# Patient Record
Sex: MALE | Race: ASIAN | NOT HISPANIC OR LATINO | Employment: FULL TIME | URBAN - METROPOLITAN AREA
[De-identification: names, ages, dates, MRNs, and addresses within clinical notes are randomized per-mention and may not be internally consistent; named-entity substitution may affect disease eponyms.]

---

## 2017-07-27 ENCOUNTER — APPOINTMENT (OUTPATIENT)
Dept: RADIOLOGY | Facility: MEDICAL CENTER | Age: 47
End: 2017-07-27
Attending: EMERGENCY MEDICINE
Payer: COMMERCIAL

## 2017-07-27 ENCOUNTER — HOSPITAL ENCOUNTER (EMERGENCY)
Facility: MEDICAL CENTER | Age: 47
End: 2017-07-27
Attending: EMERGENCY MEDICINE
Payer: COMMERCIAL

## 2017-07-27 VITALS
BODY MASS INDEX: 32.88 KG/M2 | HEIGHT: 76 IN | RESPIRATION RATE: 14 BRPM | OXYGEN SATURATION: 93 % | SYSTOLIC BLOOD PRESSURE: 95 MMHG | TEMPERATURE: 96.1 F | DIASTOLIC BLOOD PRESSURE: 63 MMHG | HEART RATE: 39 BPM | WEIGHT: 270 LBS

## 2017-07-27 DIAGNOSIS — E11.65 TYPE 2 DIABETES MELLITUS WITH HYPERGLYCEMIA, WITH LONG-TERM CURRENT USE OF INSULIN (HCC): Chronic | ICD-10-CM

## 2017-07-27 DIAGNOSIS — R42 LIGHTHEADEDNESS: ICD-10-CM

## 2017-07-27 DIAGNOSIS — Z79.4 TYPE 2 DIABETES MELLITUS WITH HYPERGLYCEMIA, WITH LONG-TERM CURRENT USE OF INSULIN (HCC): Chronic | ICD-10-CM

## 2017-07-27 DIAGNOSIS — I50.42 CHRONIC COMBINED SYSTOLIC AND DIASTOLIC HEART FAILURE (HCC): Chronic | ICD-10-CM

## 2017-07-27 LAB
ALBUMIN SERPL BCP-MCNC: 4 G/DL (ref 3.2–4.9)
ALBUMIN/GLOB SERPL: 1.5 G/DL
ALP SERPL-CCNC: 35 U/L (ref 30–99)
ALT SERPL-CCNC: 23 U/L (ref 2–50)
ANION GAP SERPL CALC-SCNC: 9 MMOL/L (ref 0–11.9)
APTT PPP: 24.7 SEC (ref 24.7–36)
AST SERPL-CCNC: 17 U/L (ref 12–45)
BASOPHILS # BLD AUTO: 0.6 % (ref 0–1.8)
BASOPHILS # BLD: 0.05 K/UL (ref 0–0.12)
BILIRUB SERPL-MCNC: 0.9 MG/DL (ref 0.1–1.5)
BNP SERPL-MCNC: 74 PG/ML (ref 0–100)
BUN SERPL-MCNC: 34 MG/DL (ref 8–22)
CALCIUM SERPL-MCNC: 8.9 MG/DL (ref 8.5–10.5)
CHLORIDE SERPL-SCNC: 104 MMOL/L (ref 96–112)
CO2 SERPL-SCNC: 21 MMOL/L (ref 20–33)
CREAT SERPL-MCNC: 1.19 MG/DL (ref 0.5–1.4)
EKG IMPRESSION: NORMAL
EOSINOPHIL # BLD AUTO: 0.26 K/UL (ref 0–0.51)
EOSINOPHIL NFR BLD: 3.3 % (ref 0–6.9)
ERYTHROCYTE [DISTWIDTH] IN BLOOD BY AUTOMATED COUNT: 42.7 FL (ref 35.9–50)
GFR SERPL CREATININE-BSD FRML MDRD: >60 ML/MIN/1.73 M 2
GLOBULIN SER CALC-MCNC: 2.7 G/DL (ref 1.9–3.5)
GLUCOSE SERPL-MCNC: 200 MG/DL (ref 65–99)
HCT VFR BLD AUTO: 42.4 % (ref 42–52)
HGB BLD-MCNC: 14.5 G/DL (ref 14–18)
IMM GRANULOCYTES # BLD AUTO: 0.03 K/UL (ref 0–0.11)
IMM GRANULOCYTES NFR BLD AUTO: 0.4 % (ref 0–0.9)
INR PPP: 1.03 (ref 0.87–1.13)
LIPASE SERPL-CCNC: 34 U/L (ref 11–82)
LYMPHOCYTES # BLD AUTO: 2.11 K/UL (ref 1–4.8)
LYMPHOCYTES NFR BLD: 26.5 % (ref 22–41)
MCH RBC QN AUTO: 30.2 PG (ref 27–33)
MCHC RBC AUTO-ENTMCNC: 34.2 G/DL (ref 33.7–35.3)
MCV RBC AUTO: 88.3 FL (ref 81.4–97.8)
MONOCYTES # BLD AUTO: 1 K/UL (ref 0–0.85)
MONOCYTES NFR BLD AUTO: 12.6 % (ref 0–13.4)
NEUTROPHILS # BLD AUTO: 4.51 K/UL (ref 1.82–7.42)
NEUTROPHILS NFR BLD: 56.6 % (ref 44–72)
NRBC # BLD AUTO: 0 K/UL
NRBC BLD AUTO-RTO: 0 /100 WBC
PLATELET # BLD AUTO: 193 K/UL (ref 164–446)
PMV BLD AUTO: 10.5 FL (ref 9–12.9)
POTASSIUM SERPL-SCNC: 4.1 MMOL/L (ref 3.6–5.5)
PROT SERPL-MCNC: 6.7 G/DL (ref 6–8.2)
PROTHROMBIN TIME: 13.8 SEC (ref 12–14.6)
RBC # BLD AUTO: 4.8 M/UL (ref 4.7–6.1)
SODIUM SERPL-SCNC: 134 MMOL/L (ref 135–145)
TROPONIN I SERPL-MCNC: 0.01 NG/ML (ref 0–0.04)
WBC # BLD AUTO: 8 K/UL (ref 4.8–10.8)

## 2017-07-27 PROCEDURE — 93005 ELECTROCARDIOGRAM TRACING: CPT | Performed by: EMERGENCY MEDICINE

## 2017-07-27 PROCEDURE — 80053 COMPREHEN METABOLIC PANEL: CPT

## 2017-07-27 PROCEDURE — 83880 ASSAY OF NATRIURETIC PEPTIDE: CPT

## 2017-07-27 PROCEDURE — 84484 ASSAY OF TROPONIN QUANT: CPT

## 2017-07-27 PROCEDURE — 85730 THROMBOPLASTIN TIME PARTIAL: CPT

## 2017-07-27 PROCEDURE — 99284 EMERGENCY DEPT VISIT MOD MDM: CPT

## 2017-07-27 PROCEDURE — 71010 DX-CHEST-PORTABLE (1 VIEW): CPT

## 2017-07-27 PROCEDURE — 36415 COLL VENOUS BLD VENIPUNCTURE: CPT

## 2017-07-27 PROCEDURE — 85025 COMPLETE CBC W/AUTO DIFF WBC: CPT

## 2017-07-27 PROCEDURE — 700105 HCHG RX REV CODE 258: Performed by: EMERGENCY MEDICINE

## 2017-07-27 PROCEDURE — 85610 PROTHROMBIN TIME: CPT

## 2017-07-27 PROCEDURE — 83690 ASSAY OF LIPASE: CPT

## 2017-07-27 PROCEDURE — 96360 HYDRATION IV INFUSION INIT: CPT

## 2017-07-27 RX ORDER — SODIUM CHLORIDE 9 MG/ML
1000 INJECTION, SOLUTION INTRAVENOUS ONCE
Status: COMPLETED | OUTPATIENT
Start: 2017-07-27 | End: 2017-07-27

## 2017-07-27 RX ADMIN — SODIUM CHLORIDE 1000 ML: 9 INJECTION, SOLUTION INTRAVENOUS at 16:38

## 2017-07-27 ASSESSMENT — ENCOUNTER SYMPTOMS
DIAPHORESIS: 1
DIZZINESS: 1
SHORTNESS OF BREATH: 1
ABDOMINAL PAIN: 0

## 2017-07-27 ASSESSMENT — PAIN SCALES - GENERAL: PAINLEVEL_OUTOF10: 0

## 2017-07-27 NOTE — ED PROVIDER NOTES
ED Provider Note    Scribed for Benigno Henriquez M.D. by Ellen Mosqueda. 7/27/2017, 4:24 PM.    Primary care provider: Ravi Mares M.D.  Means of arrival: ambulance  History obtained from: patient  History limited by: none    CHIEF COMPLAINT  Chief Complaint   Patient presents with   • Lightheadedness       HPI  Richard Franklin is a 47 y.o. Male with a history of left ventricular hypertrophy, who presents to the Emergency Department for evaluation following an episode of what the patient believed was dizziness and lightheadedness with associated diaphoresis, shortness of breath. Patient states that this happened just prior to arrival in the ED while he was high off the ground on heavy machinery, reaching over his head. He went to his supervisor who called EMS. Patient reports feeling almost completely back to normal once EMS arrived approximately 10-15 minutes later, and he continues to feel normal at this time. Patient describes extensive cardiac abnormalities including left ventricular hypertrophy, trigeminy, bigeminy, and LBBB all of which have been present since 2005. No complaints of abdominal pain.      REVIEW OF SYSTEMS  Review of Systems   Constitutional: Positive for diaphoresis.   Respiratory: Positive for shortness of breath.    Gastrointestinal: Negative for abdominal pain.   Neurological: Positive for dizziness.   All other systems reviewed and are negative.  C.     PAST MEDICAL HISTORY   has a past medical history of Chronic combined systolic and diastolic heart failure (CMS-HCC); DM (diabetes mellitus) (CMS-HCC); Diastolic dysfunction; Hyperlipoproteinemia; HTN (hypertension); Mixed hyperlipidemia; Sleep apnea; History of obesity; Palpitations; PVC (premature ventricular contraction); Hypertriglyceridemia; and LBBB (left bundle branch block).    SURGICAL HISTORY   has past surgical history that includes other.    SOCIAL HISTORY  Social History   Substance Use Topics   • Smoking status: Never  "Smoker    • Smokeless tobacco: Former User   • Alcohol Use: None      History   Drug Use Not on file       FAMILY HISTORY  Family History   Problem Relation Age of Onset   • Hypertension Mother        CURRENT MEDICATIONS  Home Medications     Reviewed by Iva Jacob R.N. (Registered Nurse) on 07/27/17 at 1611  Med List Status: Partial    Medication Last Dose Status    amitriptyline (ELAVIL) 25 MG Tab 3/8/2016 Active    atorvastatin (LIPITOR) 20 MG Tab 3/8/2016 Active    carvedilol (COREG) 25 MG TABS 3/8/2016 Active    Fenofibrate 40 MG TABS 3/8/2016 Active    Icosapent Ethyl 1 G Cap  Active    insulin aspart (NOVOLOG) 100 UNIT/ML Solution 3/8/2016 Active    insulin detemir (LEVEMIR FLEXPEN) 100 UNIT/ML Solution Pen-injector injection 3/8/2016 Active    lisinopril (PRINIVIL) 2.5 MG TABS 3/8/2016 Active    Metformin HCl 1000 MG TABLET SR 24 HR 3/8/2016 Active    tramadol (ULTRAM) 50 MG Tab 3/8/2016 Active    vardenafil (LEVITRA) 20 MG tablet 3/8/2016 Active                ALLERGIES  No Known Allergies    PHYSICAL EXAM  VITAL SIGNS: BP 95/63 mmHg  Pulse 83  Temp(Src) 35.6 °C (96.1 °F)  Resp 14  Ht 1.93 m (6' 3.98\")  Wt 122.471 kg (270 lb)  BMI 32.88 kg/m2    Constitutional: Well developed, Well nourished, No acute distress, Non-toxic appearance.   HENT: Normocephalic, Atraumatic, Bilateral external ears normal, oropharynx moist, No oral exudates, Nose normal.   Eyes: Pupils are equal round and react to light, extraocular motions are intact, conjunctiva is normal, there are no signs of exudate.   Neck: Supple, no meningeal signs.  Lymphatic: No lymphadenopathy noted.   Cardiovascular: Regular rate and rhythm without murmurs gallops or rubs.   Thorax & Lungs: No respiratory distress. Breathing comfortably. Lungs are clear to auscultation bilaterally, there are no wheezes no rales. Chest wall is nontender.  Abdomen: Soft, nontender, nondistended. Bowel sounds are present.   Skin: Warm, Dry, No erythema, "   Musculoskeletal: Good range of motion in all major joints. No tenderness to palpation or major deformities noted. Intact distal pulses, no clubbing, no cyanosis, bilateral 1+ pitting edema,   Neurologic: Alert & oriented x 3, Moving all extremities. No gross abnormalities.    Psychiatric: Affect normal, Judgment normal, Mood normal.     LABS  Results for orders placed or performed during the hospital encounter of 07/27/17   CBC with Differential   Result Value Ref Range    WBC 8.0 4.8 - 10.8 K/uL    RBC 4.80 4.70 - 6.10 M/uL    Hemoglobin 14.5 14.0 - 18.0 g/dL    Hematocrit 42.4 42.0 - 52.0 %    MCV 88.3 81.4 - 97.8 fL    MCH 30.2 27.0 - 33.0 pg    MCHC 34.2 33.7 - 35.3 g/dL    RDW 42.7 35.9 - 50.0 fL    Platelet Count 193 164 - 446 K/uL    MPV 10.5 9.0 - 12.9 fL    Neutrophils-Polys 56.60 44.00 - 72.00 %    Lymphocytes 26.50 22.00 - 41.00 %    Monocytes 12.60 0.00 - 13.40 %    Eosinophils 3.30 0.00 - 6.90 %    Basophils 0.60 0.00 - 1.80 %    Immature Granulocytes 0.40 0.00 - 0.90 %    Nucleated RBC 0.00 /100 WBC    Neutrophils (Absolute) 4.51 1.82 - 7.42 K/uL    Lymphs (Absolute) 2.11 1.00 - 4.80 K/uL    Monos (Absolute) 1.00 (H) 0.00 - 0.85 K/uL    Eos (Absolute) 0.26 0.00 - 0.51 K/uL    Baso (Absolute) 0.05 0.00 - 0.12 K/uL    Immature Granulocytes (abs) 0.03 0.00 - 0.11 K/uL    NRBC (Absolute) 0.00 K/uL   Complete Metabolic Panel (CMP)   Result Value Ref Range    Sodium 134 (L) 135 - 145 mmol/L    Potassium 4.1 3.6 - 5.5 mmol/L    Chloride 104 96 - 112 mmol/L    Co2 21 20 - 33 mmol/L    Anion Gap 9.0 0.0 - 11.9    Glucose 200 (H) 65 - 99 mg/dL    Bun 34 (H) 8 - 22 mg/dL    Creatinine 1.19 0.50 - 1.40 mg/dL    Calcium 8.9 8.5 - 10.5 mg/dL    AST(SGOT) 17 12 - 45 U/L    ALT(SGPT) 23 2 - 50 U/L    Alkaline Phosphatase 35 30 - 99 U/L    Total Bilirubin 0.9 0.1 - 1.5 mg/dL    Albumin 4.0 3.2 - 4.9 g/dL    Total Protein 6.7 6.0 - 8.2 g/dL    Globulin 2.7 1.9 - 3.5 g/dL    A-G Ratio 1.5 g/dL   Btype Natriuretic  Peptide (BNP)   Result Value Ref Range    B Natriuretic Peptide 74 0 - 100 pg/mL   Prothrombin Time (PT/INR)   Result Value Ref Range    PT 13.8 12.0 - 14.6 sec    INR 1.03 0.87 - 1.13   APTT   Result Value Ref Range    APTT 24.7 24.7 - 36.0 sec   Lipase   Result Value Ref Range    Lipase 34 11 - 82 U/L   Troponin STAT   Result Value Ref Range    Troponin I 0.01 0.00 - 0.04 ng/mL   ESTIMATED GFR   Result Value Ref Range    GFR If African American >60 >60 mL/min/1.73 m 2    GFR If Non African American >60 >60 mL/min/1.73 m 2   EKG (NOW)   Result Value Ref Range    Report       [facility] Emergency Dept.    Test Date:  2017  Pt Name:    JAMISON SILVA                  Department: ER  MRN:        [id number]                      Room:       [facility]  Gender:     M                            Technician: [id number]  :        1970                   Requested By:ER TRIAGE PROTOCOL  Order #:    [id number]                    Reading MD: YOLY GALLEGOS MD    Measurements  Intervals                                Axis  Rate:       84                           P:          -3  KS:         208                          QRS:        0  QRSD:       138                          T:          -80  QT:         408  QTc:        483    Interpretive Statements  SINUS RHYTHM  VENTRICULAR TRIGEMINY  BORDERLINE AV CONDUCTION DELAY  LEFT BUNDLE BRANCH BLOCK  BASELINE WANDER IN LEAD(S) V2  No previous ECG available for comparison    Electronically Signed On 2017 18:59:33 PDT by YOLY GALLEGOS MD       All labs reviewed by me.    EKG  As above    RADIOLOGY  DX-CHEST-PORTABLE (1 VIEW)   Final Result      No consolidation.        The radiologist's interpretation of all radiological studies have been reviewed by me.    COURSE & MEDICAL DECISION MAKING  Pertinent Labs & Imaging studies reviewed. (See chart for details)    4:24 PM - Patient seen and examined at bedside. Patient will be treated with NS 1L IV for  dehydration. Ordered with DX-Chest, estimated GFR, CMP, BNP, PT/INR, APTT, Lipase, and Troponin to evaluate his symptoms. The differential diagnoses include but are not limited to: dehydration, hypothyroidism, CHF. I informed the patient that his symptoms could be secondary to dehydration, however, basic labs and chest xray would be ordered to rule out any acute causes.      6:09 PM Patient was reevaluated at bedside. He was resting comfortably. Informed the patient of their lab and imaging results and recommended he set up with a stress test with a cardiologist. The patient agreed to their plan of care and discharge home.     Decision Making:  She presents for evaluation. Clinically the patient appears well. He states he is feeling well. I do felt most likely an overexertion. Possible heat exhaustion type situation that caused his symptoms. The patient was given a liter bolus of normal saline. He states he's feeling great after that. Laboratory studies except for sugars were completely normal. Sugar slightly elevated, but he states that he has a low but elevated normally. At this point he feels well. I feel the patient is stable for discharge. Recommend a follow-up with primary care physician as needed.  The patient will return for new or worsening symptoms and is stable at the time of discharge.      DISPOSITION:  Patient will be discharged home in stable condition.    FOLLOW UP:  Ravi Mares M.D.  [address]  [address]  [place] [address]  [phone]    Schedule an appointment as soon as possible for a visit  For re-check, Return if any symptoms worsen    FINAL IMPRESSION  1. Lightheadedness    2. Type 2 diabetes mellitus with hyperglycemia, with long-term current use of insulin (CMS-[facility])    3. Chronic combined systolic and diastolic heart failure (CMS-[facility])        I, Ellen Mosqueda (Waldemar), am scribing for, and in the presence of, Benigno Henriquez M.D..    Electronically signed by: Ellen Clemens),  7/27/2017    IBenigno M.D. personally performed the services described in this documentation, as scribed by Ellen Mosqueda in my presence, and it is both accurate and complete.    The note accurately reflects work and decisions made by me.  Benigno Henriquez  7/27/2017  9:57 PM

## 2017-07-27 NOTE — ED AVS SNAPSHOT
7/27/2017    Richard Calloway Rigoberto  [address]  [place] [place] [facility] [address]    Dear Richrad:    [facility] wants to ensure your discharge home is safe and you or your loved ones have had all of your questions answered regarding your care after you leave the hospital.    Below is a list of resources and contact information should you have any questions regarding your hospital stay, follow-up instructions, or active medical symptoms.    Questions or Concerns Regarding… Contact   Medical Questions Related to Your Discharge  (7 days a week, 8am-5pm) Contact a Nurse Care Coordinator   [phone]   Medical Questions Not Related to Your Discharge  (24 hours a day / 7 days a week)  Contact the Nurse Health Line   [phone]    Medications or Discharge Instructions Refer to your discharge packet   or contact your [facility] Primary Care Provider   [phone]   Follow-up Appointment(s) Schedule your appointment via [employer]   or contact Scheduling [phone]   Billing Review your statement via [employer]  or contact Billing [phone]   Medical Records Review your records via [employer]   or contact Medical Records [phone]     You may receive a telephone call within two days of discharge. This call is to make certain you understand your discharge instructions and have the opportunity to have any questions answered. You can also easily access your medical information, test results and upcoming appointments via the [employer] free online health management tool. You can learn more and sign up at [employer]. For assistance setting up your [employer] account, please call [phone].    Once again, we want to ensure your discharge home is safe and that you have a clear understanding of any next steps in your care. If you have any questions or concerns, please do not hesitate to contact us, we are here for you. Thank you for choosing [facility] for your healthcare needs.    Sincerely,    Your [facility] Healthcare Team

## 2017-07-27 NOTE — ED AVS SNAPSHOT
[employer] Access Code: [email or web address]  Expires: 8/26/2017  6:30 PM    [employer]  A secure, online tool to manage your health information     [employer]’s [employer]® is a secure, online tool that connects you to your personalized health information from the privacy of your home -- day or night - making it very easy for you to manage your healthcare. Once the activation process is completed, you can even access your medical information using the [employer] tj, which is available for free in the Apple Tj store or Google Play store.     [employer] provides the following levels of access (as shown below):   My Chart Features   [facility] Primary Care Doctor [facility]  Specialists [facility]  Urgent  Care Non-[facility]  Primary Care  Doctor   Email your healthcare team securely and privately 24/7 X X X X   Manage appointments: schedule your next appointment; view details of past/upcoming appointments X      Request prescription refills. X      View recent personal medical records, including lab and immunizations X X X X   View health record, including health history, allergies, medications X X X X   Read reports about your outpatient visits, procedures, consult and ER notes X X X X   See your discharge summary, which is a recap of your hospital and/or ER visit that includes your diagnosis, lab results, and care plan. X X       How to register for [employer]:  1. Go to  [email or web address].  2. Click on the Sign Up Now box, which takes you to the New Member Sign Up page. You will need to provide the following information:  a. Enter your [employer] Access Code exactly as it appears at the top of this page. (You will not need to use this code after you’ve completed the sign-up process. If you do not sign up before the expiration date, you must request a new code.)   b. Enter your date of birth.   c. Enter your home email address.   d. Click Submit, and follow the next screen’s instructions.  3. Create a [employer] ID. This will be your [employer]  login ID and cannot be changed, so think of one that is secure and easy to remember.  4. Create a [employer] password. You can change your password at any time.  5. Enter your Password Reset Question and Answer. This can be used at a later time if you forget your password.   6. Enter your e-mail address. This allows you to receive e-mail notifications when new information is available in [employer].  7. Click Sign Up. You can now view your health information.    For assistance activating your [employer] account, call [phone]

## 2017-07-27 NOTE — ED NOTES
Pt BIB EMS from work. Pt states he was at work looking up when he developed lightheadedness, SOB and became diaphoretic lasting 10-15 minutes. Symptoms have now improved. Pt also reporting feeling more tired than normal for the past month

## 2017-07-27 NOTE — ED NOTES
PIV established, blood drawn and sent to lab. Fluids infusing per ERP's orders. Orthostatic blood pressures performed. Denies any dizziness. Call light within reach, aware of POC, will continue to monitor.

## 2017-07-27 NOTE — ED AVS SNAPSHOT
Home Care Instructions                                                                                                                Richard Franklin   MRN: [id number]    Department:  [facility], Emergency Dept   Date of Visit:  7/27/2017            [facility], Emergency Dept    [address]    [place] [address]    Phone:  [phone]      You were seen by     Benigno Henriquez M.D.      Your Diagnosis Was     Lightheadedness     R42       These are the medications you received during your hospitalization from 07/27/2017 1556 to 07/27/2017 1830     Date/Time Order Dose Route Action    07/27/2017 1638 NS infusion 1,000 mL 1,000 mL Intravenous New Bag      Follow-up Information     1. Schedule an appointment as soon as possible for a visit with Ravi Mares M.D..    Specialty:  Family Medicine    Why:  For re-check, Return if any symptoms worsen    Contact information    [address]  [address]  [place] [address]  [phone]        Medication Information     Review all of your home medications and newly ordered medications with your primary doctor and/or pharmacist as soon as possible. Follow medication instructions as directed by your doctor and/or pharmacist.     Please keep your complete medication list with you and share with your physician. Update the information when medications are discontinued, doses are changed, or new medications (including over-the-counter products) are added; and carry medication information at all times in the event of emergency situations.               Medication List      ASK your doctor about these medications        Instructions    Morning Afternoon Evening Bedtime    amitriptyline 25 MG Tabs   Commonly known as:  ELAVIL        Take 25 mg by mouth every evening.   Dose:  25 mg                        atorvastatin 20 MG Tabs   Commonly known as:  LIPITOR        Take 1 Tab by mouth every day.   Dose:  20 mg                        carvedilol 25  MG Tabs   Commonly known as:  COREG        Take 1 Tab by mouth 2 Times a Day. TWICE DAILY   Dose:  25 mg                        Fenofibrate 40 MG Tabs        Take 40 mg by mouth.   Dose:  40 mg                        Icosapent Ethyl 1 G Caps        Take 2 g by mouth 2 Times a Day.   Dose:  2 g                        insulin aspart 100 UNIT/ML Soln   Commonly known as:  NOVOLOG        Inject  as instructed 3 times a day before meals.                        LEVEMIR FLEXPEN 100 UNIT/ML Sopn injection   Generic drug:  insulin detemir        Inject  as instructed every evening. Indications: 50 units                        LEVITRA 20 MG tablet   Generic drug:  vardenafil        Take 20 mg by mouth 1 time daily as needed.   Dose:  20 mg                        lisinopril 2.5 MG Tabs   Commonly known as:  PRINIVIL        Take 2 Tabs by mouth every day.   Dose:  5 mg                        Metformin HCl 1000 MG Tb24        Take  by mouth.                        tramadol 50 MG Tabs   Commonly known as:  ULTRAM        Take  mg by mouth 3 times a day.   Dose:   mg                                Procedures and tests performed during your visit     APTT    Btype Natriuretic Peptide (BNP)    CBC with Differential    Complete Metabolic Panel (CMP)    DX-CHEST-PORTABLE (1 VIEW)    ESTIMATED GFR    Lipase    Prothrombin Time (PT/INR)    Troponin STAT            Patient Information     Patient Information    Following emergency treatment: all patient requiring follow-up care must return either to a private physician or a clinic if your condition worsens before you are able to obtain further medical attention, please return to the emergency room.     Billing Information    At [facility], we work to make the billing process streamlined for our patients.  Our Representatives are here to answer any questions you may have regarding your hospital bill.  If you have insurance coverage and have supplied your insurance  information to us, we will submit a claim to your insurer on your behalf.  Should you have any questions regarding your bill, we can be reached online or by phone as follows:  Online: You are able pay your bills online or live chat with our representatives about any billing questions you may have. We are here to help Monday - Friday from 8:00am to 7:30pm and 9:00am - 12:00pm on Saturdays.  Please visit [email or web address]  for more information.   Phone:  [phone] or [phone]    Please note that your emergency physician, surgeon, pathologist, radiologist, anesthesiologist, and other specialists are not employed by [facility] and will therefore bill separately for their services.  Please contact them directly for any questions concerning their bills at the numbers below:     Emergency Physician Services:  [phone]  [facility]:  [phone]  [facility]:  [phone]  [facility]:  [phone]    1. Your final bill may vary from the amount quoted upon discharge if all procedures are not complete at that time, or if your doctor has additional procedures of which we are not aware. You will receive an additional bill if you return to the Emergency Department at [facility] for suture removal regardless of the facility of which the sutures were placed.     2. Please arrange for settlement of this account at the emergency registration.    3. All self-pay accounts are due in full at the time of treatment.  If you are unable to meet this obligation then payment is expected within 4-5 days.     4. If you have had radiology studies (CT, X-ray, Ultrasound, MRI), you have received a preliminary result during your emergency department visit. Please contact the radiology department [phone] to receive a copy of your final result. Please discuss the Final result with your primary physician or with the follow up physician  provided.     Crisis Hotline:  [place] Crisis Hotline:  3-879-GCXANWD or 1-181.872.1292  [place] Crisis Hotline:    1-154.374.7097 or 363-930-0065         ED Discharge Follow Up Questions    1. In order to provide you with very good care, we would like to follow up with a phone call in the next few days.  May we have your permission to contact you?     YES /  NO    2. What is the best phone number to call you? (       )_____-__________    3. What is the best time to call you?      Morning  /  Afternoon  /  Evening                   Patient Signature:  ____________________________________________________________    Date:  ____________________________________________________________

## 2017-07-28 NOTE — ED NOTES
Pt given discharge instructions. Pt verbalized understanding. RN to answer any questions pt and family had. Pt instructed on follow up care. VSS. Pt ambulated out to front lobby.

## 2017-07-28 NOTE — ED NOTES
Xray completed. Pt ambulated up to bathroom with steady gait. Back to bed. Call light within reach.

## 2017-09-22 ENCOUNTER — APPOINTMENT (OUTPATIENT)
Dept: SOCIAL WORK | Facility: CLINIC | Age: 47
End: 2017-09-22
Payer: COMMERCIAL

## 2017-09-22 PROCEDURE — 90471 IMMUNIZATION ADMIN: CPT | Performed by: REGISTERED NURSE

## 2017-09-22 PROCEDURE — 90686 IIV4 VACC NO PRSV 0.5 ML IM: CPT | Performed by: REGISTERED NURSE

## 2017-11-17 ENCOUNTER — APPOINTMENT (OUTPATIENT)
Dept: RADIOLOGY | Facility: MEDICAL CENTER | Age: 47
DRG: 224 | End: 2017-11-17
Attending: EMERGENCY MEDICINE
Payer: COMMERCIAL

## 2017-11-17 ENCOUNTER — HOSPITAL ENCOUNTER (INPATIENT)
Facility: MEDICAL CENTER | Age: 47
LOS: 4 days | DRG: 224 | End: 2017-11-21
Attending: EMERGENCY MEDICINE | Admitting: HOSPITALIST
Payer: COMMERCIAL

## 2017-11-17 ENCOUNTER — RESOLUTE PROFESSIONAL BILLING HOSPITAL PROF FEE (OUTPATIENT)
Dept: HOSPITALIST | Facility: MEDICAL CENTER | Age: 47
End: 2017-11-17
Payer: COMMERCIAL

## 2017-11-17 PROBLEM — E87.6 HYPOKALEMIA: Status: ACTIVE | Noted: 2017-11-17

## 2017-11-17 PROBLEM — I47.20 V-TACH (HCC): Status: ACTIVE | Noted: 2017-11-17

## 2017-11-17 LAB
ALBUMIN SERPL BCP-MCNC: 3.3 G/DL (ref 3.2–4.9)
ALBUMIN/GLOB SERPL: 1.4 G/DL
ALP SERPL-CCNC: 31 U/L (ref 30–99)
ALT SERPL-CCNC: 49 U/L (ref 2–50)
ANION GAP SERPL CALC-SCNC: 10 MMOL/L (ref 0–11.9)
ANION GAP SERPL CALC-SCNC: 12 MMOL/L (ref 0–11.9)
APTT PPP: 22 SEC (ref 24.7–36)
AST SERPL-CCNC: 39 U/L (ref 12–45)
BASOPHILS # BLD AUTO: 0.7 % (ref 0–1.8)
BASOPHILS # BLD: 0.09 K/UL (ref 0–0.12)
BILIRUB SERPL-MCNC: 0.7 MG/DL (ref 0.1–1.5)
BNP SERPL-MCNC: 114 PG/ML (ref 0–100)
BUN SERPL-MCNC: 14 MG/DL (ref 8–22)
BUN SERPL-MCNC: 15 MG/DL (ref 8–22)
CALCIUM SERPL-MCNC: 7.4 MG/DL (ref 8.5–10.5)
CALCIUM SERPL-MCNC: 9 MG/DL (ref 8.5–10.5)
CHLORIDE SERPL-SCNC: 105 MMOL/L (ref 96–112)
CHLORIDE SERPL-SCNC: 113 MMOL/L (ref 96–112)
CO2 SERPL-SCNC: 19 MMOL/L (ref 20–33)
CO2 SERPL-SCNC: 20 MMOL/L (ref 20–33)
CREAT SERPL-MCNC: 0.79 MG/DL (ref 0.5–1.4)
CREAT SERPL-MCNC: 0.83 MG/DL (ref 0.5–1.4)
EOSINOPHIL # BLD AUTO: 0.34 K/UL (ref 0–0.51)
EOSINOPHIL NFR BLD: 2.6 % (ref 0–6.9)
ERYTHROCYTE [DISTWIDTH] IN BLOOD BY AUTOMATED COUNT: 43.9 FL (ref 35.9–50)
GFR SERPL CREATININE-BSD FRML MDRD: >60 ML/MIN/1.73 M 2
GFR SERPL CREATININE-BSD FRML MDRD: >60 ML/MIN/1.73 M 2
GLOBULIN SER CALC-MCNC: 2.3 G/DL (ref 1.9–3.5)
GLUCOSE BLD-MCNC: 222 MG/DL (ref 65–99)
GLUCOSE BLD-MCNC: 265 MG/DL (ref 65–99)
GLUCOSE SERPL-MCNC: 232 MG/DL (ref 65–99)
GLUCOSE SERPL-MCNC: 300 MG/DL (ref 65–99)
HCT VFR BLD AUTO: 42.5 % (ref 42–52)
HGB BLD-MCNC: 14.2 G/DL (ref 14–18)
IMM GRANULOCYTES # BLD AUTO: 0.06 K/UL (ref 0–0.11)
IMM GRANULOCYTES NFR BLD AUTO: 0.5 % (ref 0–0.9)
INR PPP: 1.05 (ref 0.87–1.13)
LV EJECT FRACT  99904: 30
LV EJECT FRACT MOD 2C 99903: 32.02
LV EJECT FRACT MOD 4C 99902: 31.68
LV EJECT FRACT MOD BP 99901: 27.46
LYMPHOCYTES # BLD AUTO: 3.81 K/UL (ref 1–4.8)
LYMPHOCYTES NFR BLD: 29.6 % (ref 22–41)
MAGNESIUM SERPL-MCNC: 1.3 MG/DL (ref 1.5–2.5)
MAGNESIUM SERPL-MCNC: 2.2 MG/DL (ref 1.5–2.5)
MCH RBC QN AUTO: 30.3 PG (ref 27–33)
MCHC RBC AUTO-ENTMCNC: 33.4 G/DL (ref 33.7–35.3)
MCV RBC AUTO: 90.8 FL (ref 81.4–97.8)
MONOCYTES # BLD AUTO: 1.22 K/UL (ref 0–0.85)
MONOCYTES NFR BLD AUTO: 9.5 % (ref 0–13.4)
NEUTROPHILS # BLD AUTO: 7.35 K/UL (ref 1.82–7.42)
NEUTROPHILS NFR BLD: 57.1 % (ref 44–72)
NRBC # BLD AUTO: 0 K/UL
NRBC BLD AUTO-RTO: 0 /100 WBC
PLATELET # BLD AUTO: 159 K/UL (ref 164–446)
PMV BLD AUTO: 11.9 FL (ref 9–12.9)
POTASSIUM SERPL-SCNC: 3.2 MMOL/L (ref 3.6–5.5)
POTASSIUM SERPL-SCNC: 4.7 MMOL/L (ref 3.6–5.5)
PROT SERPL-MCNC: 5.6 G/DL (ref 6–8.2)
PROTHROMBIN TIME: 13.4 SEC (ref 12–14.6)
RBC # BLD AUTO: 4.68 M/UL (ref 4.7–6.1)
SODIUM SERPL-SCNC: 137 MMOL/L (ref 135–145)
SODIUM SERPL-SCNC: 142 MMOL/L (ref 135–145)
TROPONIN I SERPL-MCNC: 0.02 NG/ML (ref 0–0.04)
TSH SERPL DL<=0.005 MIU/L-ACNC: 1.56 UIU/ML (ref 0.3–3.7)
WBC # BLD AUTO: 12.9 K/UL (ref 4.8–10.8)

## 2017-11-17 PROCEDURE — 85730 THROMBOPLASTIN TIME PARTIAL: CPT

## 2017-11-17 PROCEDURE — 83880 ASSAY OF NATRIURETIC PEPTIDE: CPT

## 2017-11-17 PROCEDURE — 93005 ELECTROCARDIOGRAM TRACING: CPT | Performed by: EMERGENCY MEDICINE

## 2017-11-17 PROCEDURE — 96368 THER/DIAG CONCURRENT INF: CPT

## 2017-11-17 PROCEDURE — 96365 THER/PROPH/DIAG IV INF INIT: CPT

## 2017-11-17 PROCEDURE — 700102 HCHG RX REV CODE 250 W/ 637 OVERRIDE(OP): Performed by: HOSPITALIST

## 2017-11-17 PROCEDURE — 80053 COMPREHEN METABOLIC PANEL: CPT

## 2017-11-17 PROCEDURE — 80048 BASIC METABOLIC PNL TOTAL CA: CPT

## 2017-11-17 PROCEDURE — 85025 COMPLETE CBC W/AUTO DIFF WBC: CPT

## 2017-11-17 PROCEDURE — 700111 HCHG RX REV CODE 636 W/ 250 OVERRIDE (IP): Performed by: HOSPITALIST

## 2017-11-17 PROCEDURE — 84443 ASSAY THYROID STIM HORMONE: CPT

## 2017-11-17 PROCEDURE — 93306 TTE W/DOPPLER COMPLETE: CPT | Mod: 26 | Performed by: INTERNAL MEDICINE

## 2017-11-17 PROCEDURE — 700111 HCHG RX REV CODE 636 W/ 250 OVERRIDE (IP): Performed by: EMERGENCY MEDICINE

## 2017-11-17 PROCEDURE — 96366 THER/PROPH/DIAG IV INF ADDON: CPT

## 2017-11-17 PROCEDURE — 96372 THER/PROPH/DIAG INJ SC/IM: CPT

## 2017-11-17 PROCEDURE — 84484 ASSAY OF TROPONIN QUANT: CPT

## 2017-11-17 PROCEDURE — 700111 HCHG RX REV CODE 636 W/ 250 OVERRIDE (IP)

## 2017-11-17 PROCEDURE — 82962 GLUCOSE BLOOD TEST: CPT

## 2017-11-17 PROCEDURE — 96375 TX/PRO/DX INJ NEW DRUG ADDON: CPT

## 2017-11-17 PROCEDURE — 36415 COLL VENOUS BLD VENIPUNCTURE: CPT

## 2017-11-17 PROCEDURE — 99291 CRITICAL CARE FIRST HOUR: CPT

## 2017-11-17 PROCEDURE — 700105 HCHG RX REV CODE 258: Performed by: EMERGENCY MEDICINE

## 2017-11-17 PROCEDURE — 83735 ASSAY OF MAGNESIUM: CPT | Mod: 91

## 2017-11-17 PROCEDURE — 5A2204Z RESTORATION OF CARDIAC RHYTHM, SINGLE: ICD-10-PCS | Performed by: EMERGENCY MEDICINE

## 2017-11-17 PROCEDURE — 93306 TTE W/DOPPLER COMPLETE: CPT

## 2017-11-17 PROCEDURE — 92960 CARDIOVERSION ELECTRIC EXT: CPT

## 2017-11-17 PROCEDURE — 71010 DX-CHEST-PORTABLE (1 VIEW): CPT

## 2017-11-17 PROCEDURE — A9270 NON-COVERED ITEM OR SERVICE: HCPCS | Performed by: HOSPITALIST

## 2017-11-17 PROCEDURE — 770022 HCHG ROOM/CARE - ICU (200)

## 2017-11-17 PROCEDURE — 700105 HCHG RX REV CODE 258

## 2017-11-17 PROCEDURE — 99291 CRITICAL CARE FIRST HOUR: CPT | Performed by: HOSPITALIST

## 2017-11-17 PROCEDURE — 85610 PROTHROMBIN TIME: CPT

## 2017-11-17 RX ORDER — DEXTROSE MONOHYDRATE 50 MG/ML
500 INJECTION, SOLUTION INTRAVENOUS CONTINUOUS
Status: DISCONTINUED | OUTPATIENT
Start: 2017-11-17 | End: 2017-11-20

## 2017-11-17 RX ORDER — ICOSAPENT ETHYL 1000 MG/1
2 CAPSULE ORAL 2 TIMES DAILY
Status: DISCONTINUED | OUTPATIENT
Start: 2017-11-17 | End: 2017-11-17

## 2017-11-17 RX ORDER — POLYETHYLENE GLYCOL 3350 17 G/17G
1 POWDER, FOR SOLUTION ORAL
Status: DISCONTINUED | OUTPATIENT
Start: 2017-11-17 | End: 2017-11-21 | Stop reason: HOSPADM

## 2017-11-17 RX ORDER — PROMETHAZINE HYDROCHLORIDE 25 MG/1
12.5-25 SUPPOSITORY RECTAL EVERY 4 HOURS PRN
Status: DISCONTINUED | OUTPATIENT
Start: 2017-11-17 | End: 2017-11-21 | Stop reason: HOSPADM

## 2017-11-17 RX ORDER — CARVEDILOL 25 MG/1
25 TABLET ORAL 2 TIMES DAILY
Status: DISCONTINUED | OUTPATIENT
Start: 2017-11-17 | End: 2017-11-21 | Stop reason: HOSPADM

## 2017-11-17 RX ORDER — ONDANSETRON 2 MG/ML
4 INJECTION INTRAMUSCULAR; INTRAVENOUS EVERY 4 HOURS PRN
Status: DISCONTINUED | OUTPATIENT
Start: 2017-11-17 | End: 2017-11-18

## 2017-11-17 RX ORDER — DEXTROSE MONOHYDRATE 25 G/50ML
25 INJECTION, SOLUTION INTRAVENOUS
Status: DISCONTINUED | OUTPATIENT
Start: 2017-11-17 | End: 2017-11-21 | Stop reason: HOSPADM

## 2017-11-17 RX ORDER — ACETAMINOPHEN 325 MG/1
650 TABLET ORAL EVERY 6 HOURS PRN
Status: DISCONTINUED | OUTPATIENT
Start: 2017-11-17 | End: 2017-11-21 | Stop reason: HOSPADM

## 2017-11-17 RX ORDER — MAGNESIUM SULFATE HEPTAHYDRATE 40 MG/ML
4 INJECTION, SOLUTION INTRAVENOUS ONCE
Status: COMPLETED | OUTPATIENT
Start: 2017-11-17 | End: 2017-11-17

## 2017-11-17 RX ORDER — LORAZEPAM 2 MG/ML
0.5 INJECTION INTRAMUSCULAR EVERY 6 HOURS PRN
Status: DISCONTINUED | OUTPATIENT
Start: 2017-11-17 | End: 2017-11-21 | Stop reason: HOSPADM

## 2017-11-17 RX ORDER — PROMETHAZINE HYDROCHLORIDE 25 MG/1
12.5-25 TABLET ORAL EVERY 4 HOURS PRN
Status: DISCONTINUED | OUTPATIENT
Start: 2017-11-17 | End: 2017-11-21 | Stop reason: HOSPADM

## 2017-11-17 RX ORDER — ICOSAPENT ETHYL 1000 MG/1
2 CAPSULE ORAL 2 TIMES DAILY
COMMUNITY

## 2017-11-17 RX ORDER — LISINOPRIL 5 MG/1
5 TABLET ORAL DAILY
COMMUNITY
End: 2017-11-29 | Stop reason: SDUPTHER

## 2017-11-17 RX ORDER — BISACODYL 10 MG
10 SUPPOSITORY, RECTAL RECTAL
Status: DISCONTINUED | OUTPATIENT
Start: 2017-11-17 | End: 2017-11-21 | Stop reason: HOSPADM

## 2017-11-17 RX ORDER — LORAZEPAM 1 MG/1
0.5 TABLET ORAL EVERY 6 HOURS PRN
Status: DISCONTINUED | OUTPATIENT
Start: 2017-11-17 | End: 2017-11-21 | Stop reason: HOSPADM

## 2017-11-17 RX ORDER — AMOXICILLIN 250 MG
2 CAPSULE ORAL 2 TIMES DAILY
Status: DISCONTINUED | OUTPATIENT
Start: 2017-11-17 | End: 2017-11-21 | Stop reason: HOSPADM

## 2017-11-17 RX ORDER — LABETALOL HYDROCHLORIDE 5 MG/ML
10 INJECTION, SOLUTION INTRAVENOUS EVERY 4 HOURS PRN
Status: DISCONTINUED | OUTPATIENT
Start: 2017-11-17 | End: 2017-11-21 | Stop reason: HOSPADM

## 2017-11-17 RX ORDER — ONDANSETRON 4 MG/1
4 TABLET, ORALLY DISINTEGRATING ORAL EVERY 4 HOURS PRN
Status: DISCONTINUED | OUTPATIENT
Start: 2017-11-17 | End: 2017-11-21 | Stop reason: HOSPADM

## 2017-11-17 RX ORDER — POTASSIUM CHLORIDE 20 MEQ/1
40 TABLET, EXTENDED RELEASE ORAL ONCE
Status: COMPLETED | OUTPATIENT
Start: 2017-11-17 | End: 2017-11-17

## 2017-11-17 RX ORDER — LISINOPRIL 5 MG/1
5 TABLET ORAL DAILY
Status: DISCONTINUED | OUTPATIENT
Start: 2017-11-17 | End: 2017-11-21 | Stop reason: HOSPADM

## 2017-11-17 RX ADMIN — MAGNESIUM SULFATE IN WATER 4 G: 40 INJECTION, SOLUTION INTRAVENOUS at 12:04

## 2017-11-17 RX ADMIN — INSULIN HUMAN 4 UNITS: 100 INJECTION, SOLUTION PARENTERAL at 18:30

## 2017-11-17 RX ADMIN — DEXTROSE MONOHYDRATE 500 ML: 50 INJECTION, SOLUTION INTRAVENOUS at 23:49

## 2017-11-17 RX ADMIN — INSULIN HUMAN 7 UNITS: 100 INJECTION, SOLUTION PARENTERAL at 23:55

## 2017-11-17 RX ADMIN — PROPOFOL 100 MG: 10 INJECTION, EMULSION INTRAVENOUS at 06:28

## 2017-11-17 RX ADMIN — AMIODARONE HYDROCHLORIDE 150 MG: 50 INJECTION, SOLUTION INTRAVENOUS at 06:30

## 2017-11-17 RX ADMIN — POTASSIUM CHLORIDE 40 MEQ: 1500 TABLET, EXTENDED RELEASE ORAL at 10:22

## 2017-11-17 RX ADMIN — ENOXAPARIN SODIUM 40 MG: 100 INJECTION SUBCUTANEOUS at 18:41

## 2017-11-17 RX ADMIN — AMIODARONE HYDROCHLORIDE 0.5 MG/MIN: 50 INJECTION, SOLUTION INTRAVENOUS at 07:06

## 2017-11-17 RX ADMIN — DEXTROSE MONOHYDRATE 500 ML: 50 INJECTION, SOLUTION INTRAVENOUS at 07:40

## 2017-11-17 RX ADMIN — CARVEDILOL 25 MG: 25 TABLET, FILM COATED ORAL at 11:57

## 2017-11-17 RX ADMIN — AMIODARONE HYDROCHLORIDE 0.5 MG/MIN: 50 INJECTION, SOLUTION INTRAVENOUS at 15:15

## 2017-11-17 RX ADMIN — LISINOPRIL 5 MG: 5 TABLET ORAL at 11:57

## 2017-11-17 RX ADMIN — CARVEDILOL 25 MG: 25 TABLET, FILM COATED ORAL at 20:08

## 2017-11-17 RX ADMIN — INSULIN HUMAN 7 UNITS: 100 INJECTION, SOLUTION PARENTERAL at 12:00

## 2017-11-17 ASSESSMENT — ENCOUNTER SYMPTOMS
MYALGIAS: 0
SHORTNESS OF BREATH: 0
ABDOMINAL PAIN: 0
VOMITING: 0
HEADACHES: 0
NAUSEA: 0
FEVER: 0
PALPITATIONS: 0
NERVOUS/ANXIOUS: 1
BLURRED VISION: 0
CHILLS: 0
COUGH: 0

## 2017-11-17 ASSESSMENT — LIFESTYLE VARIABLES
ALCOHOL_USE: YES
HAVE YOU EVER FELT YOU SHOULD CUT DOWN ON YOUR DRINKING: NO
TOTAL SCORE: 0
HAVE PEOPLE ANNOYED YOU BY CRITICIZING YOUR DRINKING: NO
HAVE PEOPLE ANNOYED YOU BY CRITICIZING YOUR DRINKING: NO
CONSUMPTION TOTAL: INCOMPLETE
CONSUMPTION TOTAL: INCOMPLETE
TOTAL SCORE: 0
TOTAL SCORE: 0
EVER FELT BAD OR GUILTY ABOUT YOUR DRINKING: NO
EVER HAD A DRINK FIRST THING IN THE MORNING TO STEADY YOUR NERVES TO GET RID OF A HANGOVER: NO
EVER FELT BAD OR GUILTY ABOUT YOUR DRINKING: NO
TOTAL SCORE: 0
TOTAL SCORE: 0
DO YOU DRINK ALCOHOL: YES
HAVE YOU EVER FELT YOU SHOULD CUT DOWN ON YOUR DRINKING: NO
EVER HAD A DRINK FIRST THING IN THE MORNING TO STEADY YOUR NERVES TO GET RID OF A HANGOVER: NO
TOTAL SCORE: 0

## 2017-11-17 ASSESSMENT — COPD QUESTIONNAIRES
DURING THE PAST 4 WEEKS HOW MUCH DID YOU FEEL SHORT OF BREATH: NONE/LITTLE OF THE TIME
DO YOU EVER COUGH UP ANY MUCUS OR PHLEGM?: NO/ONLY WITH OCCASIONAL COLDS OR INFECTIONS
COPD SCREENING SCORE: 0
HAVE YOU SMOKED AT LEAST 100 CIGARETTES IN YOUR ENTIRE LIFE: NO/DON'T KNOW

## 2017-11-17 ASSESSMENT — PAIN SCALES - GENERAL
PAINLEVEL_OUTOF10: 0

## 2017-11-17 ASSESSMENT — PATIENT HEALTH QUESTIONNAIRE - PHQ9
SUM OF ALL RESPONSES TO PHQ9 QUESTIONS 1 AND 2: 0
1. LITTLE INTEREST OR PLEASURE IN DOING THINGS: NOT AT ALL
2. FEELING DOWN, DEPRESSED, IRRITABLE, OR HOPELESS: NOT AT ALL
1. LITTLE INTEREST OR PLEASURE IN DOING THINGS: NOT AT ALL
SUM OF ALL RESPONSES TO PHQ QUESTIONS 1-9: 0
2. FEELING DOWN, DEPRESSED, IRRITABLE, OR HOPELESS: NOT AT ALL
SUM OF ALL RESPONSES TO PHQ9 QUESTIONS 1 AND 2: 0
SUM OF ALL RESPONSES TO PHQ QUESTIONS 1-9: 0

## 2017-11-17 NOTE — ED NOTES
0628 Sedation start time  0628 60mg of Propofol given  0629 40mg of Propofol given  0630 Shock administered at 200J  0631 Pt. Out of V-Tach  0635 Pt. Awake and Alert  0636 Dr. Muniz left room. End Sedation

## 2017-11-17 NOTE — CONSULTS
DATE OF SERVICE:  11/17/2017    REFERRING PHYSICIANS:  Smith Cornejo MD and Sara Watson MD.    REASON FOR CONSULT:  Sustained ventricular tachycardia with syncope.    HISTORY OF PRESENT ILLNESS:  This is a pleasant 47-year-old white male.    History was obtained from the chart, the patient and the patient's wife.    Briefly, the patient was this morning, getting out of bed, got up, stopped,   may have had a slight seizure activity.  [facility]  He was in a rapid   ventricular tachycardia, received amiodarone in the field, but he was   hemodynamically stable, was brought to the ER, received some more amiodarone,   subsequently received cardioversion under sedation.  The patient was   neurologically alert and had adequate blood pressure from the time [facility] got   to the patient and also in the ER.  The patient denied any palpitations   recently.  He denies any previous syncope or presyncope.  Denies any chest   pain or shortness of breath.  He has been quite compliant with his   medications.  He does not smoke and minimal alcohol use.  He has history of a   dilated cardiomyopathy diagnosed in 2005, followed by Dr. Marie, then Dr. Harvinder Patrick, EF initially was 20% to 25%, no angiography was performed.    In 2004, his EF was normal, down in [place].  He received medical therapy and   his EF went up to 45% to 50%.  He does work.  The   patient denies any worsening heart failure symptoms.    MEDICATIONS:  Include amitriptyline, atorvastatin, Coreg, fenofibrate,   insulin, NovoLog, Levemir, metformin, Ultram, and Levitra.    PAST MEDICAL HISTORY:  He has dilated cardiomyopathy, hypertension,   hyperlipidemia, diabetes type 2, and sleep apnea.    PAST SURGICAL HISTORY:  Previous history of surgery for sleep apnea.    SOCIAL HISTORY:  He does not smoke, he is , has children.    FAMILY HISTORY:  Positive for hypertension, previously he did have   hypertension.    REVIEW OF SYSTEMS:  NEUROLOGIC:  No  stroke or seizure.  PULMONARY:  History of sleep apnea.  CARDIAC:  As above.  GASTROINTESTINAL:  No melena.  No abdominal pain.  RENAL:  No history of kidney impairment.  LIVER:  No history of liver impairment.  MUSCULOSKELETAL:  History of left hip pinning as a teenager.  ENDOCRINE:  Diabetes.  No recent weight loss.  CONSTITUTIONAL:  Negative.  Rest of review of systems negative by the AMA/CMS criteria.    PHYSICAL EXAMINATION:  VITAL SIGNS:  Blood pressure is 101/78, pulse is 80, respirations 16.  NECK:  JVD 7 cm.  Carotids without bruits.  LUNGS:  Clear.  CARDIAC:  Regular rate and rhythm, normal S1, S2 with a murmur at apex.  ABDOMEN:  Soft, nontender without hepatosplenomegaly.  EXTREMITIES:  Without clubbing, cyanosis, or edema.  SKIN:  Turgor is normal.  NEUROLOGIC:  Alert and oriented x3.    LABORATORY DATA:  Show chemistries are unremarkable.  Troponin 0.01 and 0.02.    .  INR 1.05.  TSH 1.56.  CBC, white count 12.9, hemoglobin 14.2,   hematocrit 42.5, platelets 159,000.  Chest x-ray unremarkable.  Initial   electrocardiogram showed wide complex tachycardia with a right bundle-branch   block morphology, severe right axis.  EKG following that showed sinus rhythm   with PVCs that appeared to be coming from the possible LV outflow tract or   papillary muscle.  Previous EKGs and Holters have shown Significant PVCs   before.    ASSESSMENT AND PLAN:  1.  Dilated cardiomyopathy, probably nonischemic.  The patient is scheduled   for coronary angiography today.  Echocardiogram today showed ejection fraction   of 25% to 30%, global hypokinesis.  2.  Frequent premature ventricular contractions, possibly contributing to the   patient's cardiomyopathy would best be served by some kind of suppression,   possibly with amiodarone, may come to ablation.  3.  Intraventricular conduction deficit of a left bundle-branch block type.    If angiography shows no evidence of need for revascularization,  the patient   will  require a defibrillator and possibly with possibly an LV lead for secondary   prevention.  4.  Insulin-dependent diabetes.  5.  Sleep apnea.       ____________________________________     MD MICHELLE SÁNCHEZ / MELISSA    DD:  11/17/2017 12:40:42  DT:  11/17/2017 13:47:59    D#:  [id number]  Job#:  [id number]

## 2017-11-17 NOTE — ED PROVIDER NOTES
ED Provider Note    CHIEF COMPLAINT  Chief Complaint   Patient presents with   • Chest Pain     Pt. had a GLF out of bed today. When EMS arrived, pt. was diaphorhetic and in V-Tach. Pt. given 150 of Amiodarone by EMS.        HPI  Richard Franklin is a 47 y.o. male who presents For evaluation of chest pain and lightheadedness dizziness. Patient has no extensive medical history as listed below. he has a history of heart failure but has been noncompliant with his follow-ups with Dr. Patrick with cardiology. He reports getting out of bed at 4:30 feeling okay but then when he woke up and about an hour later he felt lightheaded and dizzy and had chest pressure repair medics noted he was profoundly diaphoretic and he was in a wide complex regular tachycardia consistent with ventricular tachycardia. They gave him a 150 mg amiodarone bolus with no improvement. She reports extreme discomfort diaphoresis nausea with associated chest pain    REVIEW OF SYSTEMS  See HPI for further details. No fevers chills night sweats weight loss numbness tingling or weakness All other systems are negative.     PAST MEDICAL HISTORY  Past Medical History:   Diagnosis Date   • Chronic combined systolic and diastolic heart failure (CMS-HCC)    • Diastolic dysfunction    • DM (diabetes mellitus) (CMS-HCC)    • History of obesity    • HTN (hypertension)    • Hyperlipoproteinemia    • Hypertriglyceridemia    • LBBB (left bundle branch block)    • Mixed hyperlipidemia    • Palpitations    • PVC (premature ventricular contraction)    • Sleep apnea        FAMILY HISTORY  No history of bleeding disorder    SOCIAL HISTORY  Social History     Social History   • Marital status:      Spouse name: N/A   • Number of children: N/A   • Years of education: N/A     Social History Main Topics   • Smoking status: Never Smoker   • Smokeless tobacco: Former User   • Alcohol use Not on file   • Drug use: Unknown   • Sexual activity: Not on file     Other Topics  "Concern   • Not on file     Social History Narrative   • No narrative on file     Occasional alcohol  SURGICAL HISTORY  Past Surgical History:   Procedure Laterality Date   • OTHER      THROAT SURGERY: Uvulopalatopharyngoplasty.       CURRENT MEDICATIONS  Home Medications     Reviewed by Magdalena Golden R.N. (Registered Nurse) on 11/17/17 at 0619  Med List Status: Partial   Medication Last Dose Status   amitriptyline (ELAVIL) 25 MG Tab 3/8/2016 Active   atorvastatin (LIPITOR) 20 MG Tab 3/8/2016 Active   carvedilol (COREG) 25 MG TABS 3/8/2016 Active   Fenofibrate 40 MG TABS 3/8/2016 Active   Icosapent Ethyl 1 G Cap  Active   insulin aspart (NOVOLOG) 100 UNIT/ML Solution 3/8/2016 Active   insulin detemir (LEVEMIR FLEXPEN) 100 UNIT/ML Solution Pen-injector injection 3/8/2016 Active   lisinopril (PRINIVIL) 2.5 MG TABS 3/8/2016 Active   Metformin HCl 1000 MG TABLET SR 24 HR 3/8/2016 Active   tramadol (ULTRAM) 50 MG Tab 3/8/2016 Active   vardenafil (LEVITRA) 20 MG tablet 3/8/2016 Active                ALLERGIES  No Known Allergies    PHYSICAL EXAM  VITAL SIGNS: /76   Pulse (!) 115   Resp 15   Ht 1.93 m (6' 4\")   Wt 120.2 kg (265 lb)   SpO2 100%   BMI 32.26 kg/m²   Room air O2: 98    Constitutional:Ill-appearing diaphoretic  HENT: Normocephalic, Atraumatic, Bilateral external ears normal, Oropharynx moist, No oral exudates, Nose normal.   Eyes: PERRLA, EOMI, Conjunctiva normal, No discharge.   Neck: Normal range of motion, No tenderness, Supple, No stridor.    Cardiovascular: Tachycardic, No murmurs, No rubs, No gallops.   Thorax & Lungs: Normal breath sounds, No respiratory distress, No wheezing, No chest tenderness.   Abdomen: Bowel sounds normal, Soft, No tenderness, No masses, No pulsatile masses.   Skin: Warm, Dry, No erythema, No rash.   Back: No tenderness, No CVA tenderness.   Extremities: Intact distal pulses, No edema, No tenderness, No cyanosis, No clubbing.   Musculoskeletal: Good range of " motion in all major joints. No tenderness to palpation or major deformities noted.   Neurologic: Alert & oriented x 3, Normal motor function, Normal sensory function, No focal deficits noted.   Psychiatric: Anxious     EKG  EKG #1 interpretation by me wide complex regular tachycardia monomorphic ventricular tachycardia.  EKG #2, interpretation by me sinus rhythm, left bundle branch block, ST depression in the lateral leads PVCs noted and no acute ST segment elevation    RADIOLOGY/PROCEDURES  DX-CHEST-PORTABLE (1 VIEW)   Final Result      No acute cardiopulmonary abnormality. No interval change.          Results for orders placed or performed during the hospital encounter of 11/17/17   CBC WITH DIFFERENTIAL   Result Value Ref Range    WBC 12.9 (H) 4.8 - 10.8 K/uL    RBC 4.68 (L) 4.70 - 6.10 M/uL    Hemoglobin 14.2 14.0 - 18.0 g/dL    Hematocrit 42.5 42.0 - 52.0 %    MCV 90.8 81.4 - 97.8 fL    MCH 30.3 27.0 - 33.0 pg    MCHC 33.4 (L) 33.7 - 35.3 g/dL    RDW 43.9 35.9 - 50.0 fL    Platelet Count 159 (L) 164 - 446 K/uL    MPV 11.9 9.0 - 12.9 fL    Neutrophils-Polys 57.10 44.00 - 72.00 %    Lymphocytes 29.60 22.00 - 41.00 %    Monocytes 9.50 0.00 - 13.40 %    Eosinophils 2.60 0.00 - 6.90 %    Basophils 0.70 0.00 - 1.80 %    Immature Granulocytes 0.50 0.00 - 0.90 %    Nucleated RBC 0.00 /100 WBC    Neutrophils (Absolute) 7.35 1.82 - 7.42 K/uL    Lymphs (Absolute) 3.81 1.00 - 4.80 K/uL    Monos (Absolute) 1.22 (H) 0.00 - 0.85 K/uL    Eos (Absolute) 0.34 0.00 - 0.51 K/uL    Baso (Absolute) 0.09 0.00 - 0.12 K/uL    Immature Granulocytes (abs) 0.06 0.00 - 0.11 K/uL    NRBC (Absolute) 0.00 K/uL   COMP METABOLIC PANEL   Result Value Ref Range    Sodium 142 135 - 145 mmol/L    Potassium 3.2 (L) 3.6 - 5.5 mmol/L    Chloride 113 (H) 96 - 112 mmol/L    Co2 19 (L) 20 - 33 mmol/L    Anion Gap 10.0 0.0 - 11.9    Glucose 300 (H) 65 - 99 mg/dL    Bun 15 8 - 22 mg/dL    Creatinine 0.83 0.50 - 1.40 mg/dL    Calcium 7.4 (L) 8.5 - 10.5  mg/dL    AST(SGOT) 39 12 - 45 U/L    ALT(SGPT) 49 2 - 50 U/L    Alkaline Phosphatase 31 30 - 99 U/L    Total Bilirubin 0.7 0.1 - 1.5 mg/dL    Albumin 3.3 3.2 - 4.9 g/dL    Total Protein 5.6 (L) 6.0 - 8.2 g/dL    Globulin 2.3 1.9 - 3.5 g/dL    A-G Ratio 1.4 g/dL   TROPONIN   Result Value Ref Range    Troponin I 0.02 0.00 - 0.04 ng/mL   PROTHROMBIN TIME   Result Value Ref Range    PT 13.4 12.0 - 14.6 sec    INR 1.05 0.87 - 1.13   APTT   Result Value Ref Range    APTT 22.0 (L) 24.7 - 36.0 sec   ESTIMATED GFR   Result Value Ref Range    GFR If African American >60 >60 mL/min/1.73 m 2    GFR If Non African American >60 >60 mL/min/1.73 m 2   EKG (ER)   Result Value Ref Range    Report       [facility] Emergency Dept.    Test Date:  2017  Pt Name:    JAMISON SILVA                  Department: ER  MRN:        [id number]                      Room:       RD 12  Gender:     M                            Technician: [id number]  :        1970                   Requested By:FRANK BURT  Order #:    [id number]                    Reading MD:    Measurements  Intervals                                Axis  Rate:       188                          P:          -13  ID:         92                           QRS:        166  QRSD:       228                          T:          229  QT:         267  QTc:        473    Interpretive Statements  EXTREME TACHYCARDIA WITH WIDE COMPLEX, NO FURTHER RHYTHM ANALYSIS ATTEMPTED  BASELINE WANDER IN LEAD(S) V3,V4,V5,V6  Compared to ECG 2017 16:07:02  Sinus rhythm no longer present  Ventricular premature complex(es) no longer present  Left bundle-branch block no longer present     EKG (ER)   Result Value Ref Range    Report       [facility] Emergency Dept.    Test Date:  2017  Pt Name:    JAMISON SILVA                  Department: ER  MRN:        [id number]                      Room:       RD 12  Gender:     M                            Technician:  [id number]  :        1970                   Requested By:FRANK BURT  Order #:    [id number]                    Reading MD:    Measurements  Intervals                                Axis  Rate:       77                           P:          16  NH:         236                          QRS:        3  QRSD:       134                          T:          246  QT:         376  QTc:        426    Interpretive Statements  SINUS RHYTHM  VENTRICULAR BIGEMINY  FIRST DEGREE AV BLOCK  LEFT BUNDLE BRANCH BLOCK  BASELINE WANDER IN LEAD(S) III,V1,V5  Compared to ECG 2017 06:15:15  Ventricular premature complex(es) now present  First degree AV block now present  Left bundle-branch block now present          COURSE & MEDICAL DECISION MAKING  Pertinent Labs & Imaging studies reviewed. (See chart for details)  Physician procedure: Emergent cardioversion. The patient is wife were consented. He has been nothing by mouth since 11 PM last night. He was given a total of 100 mg of IV propofol. Defibrillation pads were placed on the anterior and posterior chest wall. The patient was fully sedated but hemodynamically stable. 200 J were used to provide synchronized cardioversion which was successful. No complications. The patient did require a brief chin lift maneuver due to a brief hypoxic episode when he desaturated to 88%    Patient presented here quite ill-appearing. He was in ventricular tachycardia that failed initial boluses of amiodarone. Emergent consultation with cardiology was obtained. The patient was cardioverted by myself successfully. We started him on an amiodarone infusion. The patient is critically ill. He'll be admitted to the cardiac intensive care unit in expectation of likely placement of an AICD    FINAL IMPRESSION  1. Ventricular tachycardia  2. Cardiogenic shock    CRITICAL CARE TIME:    The patient required approximately 40 minutes worth of critical care time. This excludes any procedures. This includes  time spent directly at caring for the patient, making critical medical decisions, involving consultants and speaking with the family.    Electronically signed by: Hussein Sanchez, 11/17/2017 6:49 AM

## 2017-11-17 NOTE — ED NOTES
Resting on gurney, NAD. Continues to have frequent ectopy, but VSS. Up to bedside commode. Awaiting bed assignment.

## 2017-11-17 NOTE — PROGRESS NOTES
47-year-old gentleman seen urgently   in the ER at the request of Dr. Sanchez for evaluation of VT.  The patient has   a history of a dilated cardiomyopathy, which was first diagnosed in 2005.    According to records, at that time, his EF was 15% and was felt that his LV   dysfunction may have been secondary to untreated hypertension.  In 2007, his   EF had increased to 45% with medical therapy.     1.  Sustained ventricular tachycardia.  Patient presented with sudden onset of   loss of consciousness and perfusing ventricular tachycardia, was successfully   cardioverted.  2.  History of cardiomyopathy, presumed initially in 2005 to be secondary to   untreated hypertension.  3.  History of hypertension.  4.  History of hyperlipidemia.  5.  History of sleep apnea.  6.  Chronic left bundle-branch block.  7.  Diabetes mellitus type 2.     EPS requestedLHC and possible device implantation.  Will follow  Thx

## 2017-11-17 NOTE — ED NOTES
Med rec complete per patient and [facility]  Allergies reviewed  No ORAL antibiotics in last 30 days    Patient has been out of Vascepa for a couple weeks

## 2017-11-17 NOTE — CONSULTS
Full note to be dictated.  Patient seen and eamined.  Presumed NICM with rapid VT this am with syncope.  Shocked back. No chest pain or SOB.  Presumed NICM diagnosed in 2005 EF low at that time.  Recent EF in 40's. Frequent ventricular ectopy as possible contributor of CM.  On amio now.  On appropriate heart failure meds.  Rec.  Echo done EF 25 %.  Cath.  PVC suppression.  Probable AICD plus possibly LV lead.

## 2017-11-17 NOTE — ASSESSMENT & PLAN NOTE
No acute exacerbation of heart failure  Continue Coreg, continue lisinopril  CT chest to eval for sarcoid.  Cardiac MRI    Echo:  CONCLUSIONS  Prior echocardiogram 11/17/2017.Left ventricle is moderately dilated.  Moderate concentric left ventricular hypertrophy.  Left ventricular ejection fraction is visually estimated to be 30 to   35%.

## 2017-11-17 NOTE — ASSESSMENT & PLAN NOTE
Patient received amiodarone and required cardioversion in the emergency room  s/p amiodarone drip  Cardiac cath shows nonobstructive coronary artery disease disease  Electrolytes have been corrected.  AICD placed on 11/20

## 2017-11-17 NOTE — ED NOTES
Assumed care of patient, report from Magdalena ARZOLA. Resting on gurney, Sinus tach with frequent PVCs. Otherwise VSS. Family at bedside, call light within reach.

## 2017-11-17 NOTE — H&P
Hospital Medicine History and Physical    Date of Service  11/17/2017    Chief Complaint  Chief Complaint   Patient presents with   • Chest Pain     Pt. had a GLF out of bed today. When EMS arrived, pt. was diaphorhetic and in V-Tach. Pt. given 150 of Amiodarone by EMS.        History of Presenting Illness  This is a 48yo male with history of dilated cardiomyopathy, which was first diagnosed in 2005 with an EF of 15%, but improved in 2007 to 45% with medical therapy.  He has been noncompliant with followup with cardiology because of financial reasons. He was most recently by Dr. Patrick in March 2016.    He was in his normal state of health until this morning; he woke up at 430 and went to the restroom, felt fine at that time.  Then he apparently rolled out of bed at 530 and hit the floor and the armoire.  He was initially unresponsive, he then got up to his knees, but would not respond to his wife.  He then fell back to the floor and was unresponsive.  His wife said he then awoke and took a very deep breath, then stumbled to the shower (to go to work), and remembers feeling extremely dizzy.      Paramedics arrived and he was found to be in a perfusing ventricular tachycardia. He was brought in to the ER and did not respond to the 2 amiodarone pushes he was given (one in EMS, one in ER).  He was then cardioverted successfully x1.  He has been compliant with his medications, but did miss his coreg dose last night.  ]  Primary Care Physician  Ravi Mares M.D.    Consultants  Cardiology Dr. Chantal Peck    Code Status  FULL    Review of Systems  Review of Systems   Constitutional: Negative for chills and fever.   Eyes: Negative for blurred vision.   Respiratory: Negative for cough and shortness of breath (didn't feel like he could get a good breath, resolved now).    Cardiovascular: Negative for chest pain (had pressure, then once in ER, mild pain, now both resolved) and palpitations (resolved).    Gastrointestinal: Negative for abdominal pain, nausea and vomiting.   Genitourinary: Negative for dysuria.   Musculoskeletal: Negative for myalgias.   Neurological: Negative for headaches. Dizziness: resolved, was very dizzy at home.   Psychiatric/Behavioral: The patient is nervous/anxious.         Past Medical History  Past Medical History:   Diagnosis Date   • Chronic combined systolic and diastolic heart failure (CMS-[facility])    • Diastolic dysfunction    • DM (diabetes mellitus) (CMS-[facility])    • History of obesity    • HTN (hypertension)    • Hyperlipoproteinemia    • Hypertriglyceridemia    • LBBB (left bundle branch block)    • Mixed hyperlipidemia    • Palpitations    • PVC (premature ventricular contraction)    • Sleep apnea-s/p uvulopalatopharyngoplasty    Difficulty with cards f/u 2/2 cost, sees Dr. Patrick when he can    Surgical History  Past Surgical History:   Procedure Laterality Date   • OTHER      THROAT SURGERY: Uvulopalatopharyngoplasty.       Medications  No current facility-administered medications on file prior to encounter.      Current Outpatient Prescriptions on File Prior to Encounter   Medication Sig Dispense Refill   • insulin aspart (NOVOLOG) 100 UNIT/ML Solution Inject 30-50 Units as instructed 3 times a day before meals. 30 AM  50 AFTERNOON  30 PM     • carvedilol (COREG) 25 MG TABS Take 1 Tab by mouth 2 Times a Day. TWICE DAILY 60 Each 11   Also lisinopril 5mg daily and Vascepa (ran out 2 weeks ago)    Family History  Family History   Problem Relation Age of Onset   • Hypertension Mother    Mother also with heart disease but no vtach hx  Dad with some sort of heart problem    Social History  Social History   Substance Use Topics   • Smoking status: Never Smoker   • Smokeless tobacco: Former User   • Alcohol use Not on file   Has 4 kids, lives with them and wife    Allergies  No Known Allergies     Physical Exam  Laboratory   Hemodynamics  No data recorded.      Pulse  Av  Min: 46   Max: 189 Heart Rate (Monitored): 82  Blood Pressure: 100/76, NIBP: 114/70      Respiratory      Respiration: 18, Pulse Oximetry: 96 %             Physical Exam   Constitutional: He is oriented to person, place, and time. He appears well-developed and well-nourished.   Mildly overweight   HENT:   Head: Normocephalic and atraumatic.   Cardiovascular: Normal rate, regular rhythm and normal heart sounds.    No murmur heard.  Now in SR 80s   Pulmonary/Chest: Effort normal and breath sounds normal. No respiratory distress. He has no wheezes. He has no rales.   Abdominal: Soft. Bowel sounds are normal. He exhibits no distension. There is no tenderness.   Musculoskeletal: Normal range of motion. He exhibits no edema.   Neurological: He is alert and oriented to person, place, and time.   Skin: Skin is warm and dry. No erythema.   Psychiatric:   Anxious, tearful   Nursing note and vitals reviewed.      Recent Labs      11/17/17   0610   WBC  12.9*   RBC  4.68*   HEMOGLOBIN  14.2   HEMATOCRIT  42.5   MCV  90.8   MCH  30.3   MCHC  33.4*   RDW  43.9   PLATELETCT  159*   MPV  11.9     Recent Labs      11/17/17   0610   SODIUM  142   POTASSIUM  3.2*   CHLORIDE  113*   CO2  19*   GLUCOSE  300*   BUN  15   CREATININE  0.83   CALCIUM  7.4*     Recent Labs      11/17/17   0610   ALTSGPT  49   ASTSGOT  39   ALKPHOSPHAT  31   TBILIRUBIN  0.7   GLUCOSE  300*     Recent Labs      11/17/17   0610   APTT  22.0*   INR  1.05     Recent Labs      11/17/17   0610   BNPBTYPENAT  114*         Lab Results   Component Value Date    TROPONINI 0.02 11/17/2017       Imaging  CXR  No acute cardiopulmonary abnormality. No interval change.   Assessment/Plan     I anticipate this patient will require at least two midnights for appropriate medical management, necessitating inpatient admission.    * V-tach (CMS-[facility])- (present on admission)   Assessment & Plan    -s/p amiodarone x2, then cardioversion  -now on amio drip and remains in SR in the 80s, much  more comfortable  -cards following, Dr. Cornejo saw her and has consulted EP Dr. Peck.    -echo and cardiac cath pending today, likely will get AICD before DC this stay  -continue home coreg   -replace K, check mag  -check tsh        Hypokalemia- (present on admission)   Assessment & Plan    -replacing, also checking stat mag        Chronic combined systolic and diastolic heart failure (CMS-HCC)- (present on admission)   Assessment & Plan    -hasn't seen cards in about a year, 2/2 financial constraints, was to get a stress test but hasn't had the money  -continue meds, cards following        LBBB (left bundle branch block)- (present on admission)   Assessment & Plan    -historical        HTN (hypertension)- (present on admission)   Assessment & Plan    -continue home lisinopril, coreg with holding parameters        DM (diabetes mellitus) (CMS-HCC)- (present on admission)   Assessment & Plan    -ssi, uses regular prior to meals at home  -hold metformin for now        Sleep apnea- (present on admission)   Assessment & Plan    -s/p UVP            VTE prophylaxis: SQ lovenox, bowel protocol.       Pt critically ill and on amiodarone drip to control vtach, going to [facility], 38 minutes spent, no overlap.

## 2017-11-17 NOTE — CONSULTS
DATE OF SERVICE:  11/17/2017    ?CHIEF COMPLAINT:  Ventricular tachycardia.    HISTORY OF PRESENT ILLNESS:  Patient is a 47-year-old gentleman seen urgently   in the ER at the request of Dr. Sanchez for evaluation of VT.  The patient has   a history of a dilated cardiomyopathy, which was first diagnosed in 2005.    According to records, at that time, his EF was 15% and was felt that his LV   dysfunction may have been secondary to untreated hypertension.  In 2007, his   EF had increased to 45% with medical therapy.  The patient has a history of   sleep apnea that was treated with palato-uvulopharyngoplasty.  He currently   does not wear a BiPAP mask.  The patient has been noncompliant with followup   and was seen in our office by Dr. Marr in 2013 and most recently by Dr. Patrick in March 2016.    He had been doing well and working in a warehouse.  He had no exertional   dyspnea, dependent edema, syncope or presyncope.    This morning at 05:30, his wife heard him roll out of bed and hit the floor   and noted that he was initially unresponsive, he then got up to his knees, but   would not respond to her.  He then fell back to the floor and was   unresponsive.  Paramedics arrived and he was found to be in a perfusing   ventricular tachycardia.  Patient was brought in to the ER and was   cardioverted successfully x1.  Currently, he is awake and alert.  He states he   has been compliant taking his carvedilol.    MEDICATIONS:  His outpatient medical regimen consists of amitriptyline 25 mg   every evening, atorvastatin 20 mg a day, carvedilol 25 mg twice daily,   fenofibrate 40 mg a day, insulin, NovoLog and Levemir.  Metformin 1000 mg   daily, Ultram as needed, Levitra as needed.    ILLNESS:  Dilated cardiomyopathy, hypertension, hyperlipidemia, diabetes type   2, sleep apnea, history of obesity.    SOCIAL HISTORY:  The patient is  and nonsmoker.    FAMILY HISTORY:  Positive for hypertension in his  mother.    REVIEW OF SYSTEMS:  NEUROLOGIC:  No stroke or TIA.  PULMONARY:  History of sleep apnea.  CARDIAC:  As above.  GASTROINTESTINAL:  No melena.  No abdominal pain.  RENAL:  No history of kidney impairment, kidney failure.  MUSCULOSKELETAL:  History of left hip pinning as a teenager.  No recent   trauma.  ENDOCRINE:  History of diabetes.  He has had no recent weight loss.    PHYSICAL EXAMINATION:  GENERAL:  Patient is currently in no distress.  He was sedated for his   cardioversion and is now alert.  VITAL SIGNS:  On the monitor, he is in sinus rhythm at 110 per minute, blood   pressure 101/78.  HEENT:  Pupils are equal, gaze conjugate, sclerae nonicteric.  NECK:  There is no JVD.  There is no bruit.  LUNGS:  Clear bilaterally.  BACK:  Without deformity.  HEART:  Regular rate and rhythm without S3, without murmur.  ABDOMEN:  Obese, soft.  No hepatomegaly.  No pain to palpation.  EXTREMITIES:  No edema.  Posterior tibial pulses are 2+, dorsalis pedis pulses   palpable.  SKIN:  Warm and dry.  NEUROLOGIC:  The patient is alert and cooperative with no facial droop.    Initial EKG demonstrates ventricular tachycardia at 188 per minute.    IMPRESSION:  1.  Sustained ventricular tachycardia.  Patient presented with sudden onset of   loss of consciousness and perfusing ventricular tachycardia, was successfully   cardioverted.  2.  History of cardiomyopathy, presumed initially in 2005 to be secondary to   untreated hypertension.  3.  History of hypertension.  4.  History of hyperlipidemia.  5.  History of sleep apnea.  6.  Chronic left bundle-branch block.  7.  Diabetes mellitus type 2.    PLAN:  Patient has been started on amiodarone infusion and will be seen in   consultation by electrophysiology colleagues for consideration of AICD.  An   echo will be obtained to evaluate his LV function.  The patient has history of   noncompliant behavior with lack of appropriate cardiac followup and   appropriate compliance with  medical regimen and followup was strongly   reinforced today.       ____________________________________     MD MUKUND CARRANZA / MELISSA    DD:  11/17/2017 07:10:22  DT:  11/17/2017 07:47:05    D#:  [id number]  Job#:  [id number]

## 2017-11-17 NOTE — ED NOTES
"Richard Franklin  47 y.o.  Chief Complaint   Patient presents with   • Chest Pain     Pt. had a GLF out of bed today. When EMS arrived, pt. was diaphorhetic and in V-Tach. Pt. given 150 of Amiodarone by EMS.      /76   Pulse (!) 189   Resp 15   Ht 1.93 m (6' 4\")   Wt 120.2 kg (265 lb)   SpO2 98%   BMI 32.26 kg/m²     "

## 2017-11-18 LAB
ALBUMIN SERPL BCP-MCNC: 3.6 G/DL (ref 3.2–4.9)
ALBUMIN/GLOB SERPL: 1.5 G/DL
ALP SERPL-CCNC: 33 U/L (ref 30–99)
ALT SERPL-CCNC: 51 U/L (ref 2–50)
ANION GAP SERPL CALC-SCNC: 5 MMOL/L (ref 0–11.9)
AST SERPL-CCNC: 64 U/L (ref 12–45)
BILIRUB SERPL-MCNC: 0.7 MG/DL (ref 0.1–1.5)
BUN SERPL-MCNC: 19 MG/DL (ref 8–22)
CALCIUM SERPL-MCNC: 8.8 MG/DL (ref 8.5–10.5)
CHLORIDE SERPL-SCNC: 107 MMOL/L (ref 96–112)
CO2 SERPL-SCNC: 22 MMOL/L (ref 20–33)
CREAT SERPL-MCNC: 0.85 MG/DL (ref 0.5–1.4)
EKG IMPRESSION: NORMAL
ERYTHROCYTE [DISTWIDTH] IN BLOOD BY AUTOMATED COUNT: 42.1 FL (ref 35.9–50)
GFR SERPL CREATININE-BSD FRML MDRD: >60 ML/MIN/1.73 M 2
GLOBULIN SER CALC-MCNC: 2.4 G/DL (ref 1.9–3.5)
GLUCOSE BLD-MCNC: 262 MG/DL (ref 65–99)
GLUCOSE BLD-MCNC: 286 MG/DL (ref 65–99)
GLUCOSE BLD-MCNC: 359 MG/DL (ref 65–99)
GLUCOSE SERPL-MCNC: 275 MG/DL (ref 65–99)
HCT VFR BLD AUTO: 42.1 % (ref 42–52)
HGB BLD-MCNC: 14.3 G/DL (ref 14–18)
MAGNESIUM SERPL-MCNC: 1.7 MG/DL (ref 1.5–2.5)
MCH RBC QN AUTO: 30.2 PG (ref 27–33)
MCHC RBC AUTO-ENTMCNC: 34 G/DL (ref 33.7–35.3)
MCV RBC AUTO: 88.8 FL (ref 81.4–97.8)
PLATELET # BLD AUTO: 180 K/UL (ref 164–446)
PMV BLD AUTO: 11 FL (ref 9–12.9)
POTASSIUM SERPL-SCNC: 3.9 MMOL/L (ref 3.6–5.5)
PROT SERPL-MCNC: 6 G/DL (ref 6–8.2)
RBC # BLD AUTO: 4.74 M/UL (ref 4.7–6.1)
SODIUM SERPL-SCNC: 134 MMOL/L (ref 135–145)
WBC # BLD AUTO: 11.5 K/UL (ref 4.8–10.8)

## 2017-11-18 PROCEDURE — 93010 ELECTROCARDIOGRAM REPORT: CPT | Performed by: INTERNAL MEDICINE

## 2017-11-18 PROCEDURE — 700111 HCHG RX REV CODE 636 W/ 250 OVERRIDE (IP)

## 2017-11-18 PROCEDURE — C1769 GUIDE WIRE: HCPCS

## 2017-11-18 PROCEDURE — 85027 COMPLETE CBC AUTOMATED: CPT

## 2017-11-18 PROCEDURE — 93005 ELECTROCARDIOGRAM TRACING: CPT | Performed by: INTERNAL MEDICINE

## 2017-11-18 PROCEDURE — 770022 HCHG ROOM/CARE - ICU (200)

## 2017-11-18 PROCEDURE — 80053 COMPREHEN METABOLIC PANEL: CPT

## 2017-11-18 PROCEDURE — 360979 HCHG DIAGNOSTIC CATH

## 2017-11-18 PROCEDURE — C1887 CATHETER, GUIDING: HCPCS

## 2017-11-18 PROCEDURE — 93308 TTE F-UP OR LMTD: CPT | Mod: 26 | Performed by: INTERNAL MEDICINE

## 2017-11-18 PROCEDURE — C1894 INTRO/SHEATH, NON-LASER: HCPCS

## 2017-11-18 PROCEDURE — 99152 MOD SED SAME PHYS/QHP 5/>YRS: CPT

## 2017-11-18 PROCEDURE — 93571 IV DOP VEL&/PRESS C FLO 1ST: CPT

## 2017-11-18 PROCEDURE — 93308 TTE F-UP OR LMTD: CPT

## 2017-11-18 PROCEDURE — 99233 SBSQ HOSP IP/OBS HIGH 50: CPT | Performed by: HOSPITALIST

## 2017-11-18 PROCEDURE — 307093 HCHG TR BAND RADIAL

## 2017-11-18 PROCEDURE — A9270 NON-COVERED ITEM OR SERVICE: HCPCS | Performed by: HOSPITALIST

## 2017-11-18 PROCEDURE — 99153 MOD SED SAME PHYS/QHP EA: CPT

## 2017-11-18 PROCEDURE — 83735 ASSAY OF MAGNESIUM: CPT

## 2017-11-18 PROCEDURE — 700102 HCHG RX REV CODE 250 W/ 637 OVERRIDE(OP): Performed by: HOSPITALIST

## 2017-11-18 PROCEDURE — 700111 HCHG RX REV CODE 636 W/ 250 OVERRIDE (IP): Performed by: HOSPITALIST

## 2017-11-18 PROCEDURE — 304952 HCHG R 2 PADS

## 2017-11-18 PROCEDURE — 93458 L HRT ARTERY/VENTRICLE ANGIO: CPT

## 2017-11-18 PROCEDURE — 700117 HCHG RX CONTRAST REV CODE 255: Performed by: INTERNAL MEDICINE

## 2017-11-18 PROCEDURE — 82962 GLUCOSE BLOOD TEST: CPT | Mod: 91

## 2017-11-18 PROCEDURE — 700105 HCHG RX REV CODE 258: Performed by: INTERNAL MEDICINE

## 2017-11-18 PROCEDURE — 700105 HCHG RX REV CODE 258

## 2017-11-18 PROCEDURE — 700101 HCHG RX REV CODE 250

## 2017-11-18 RX ORDER — ADENOSINE 3 MG/ML
INJECTION, SOLUTION INTRAVENOUS
Status: COMPLETED
Start: 2017-11-18 | End: 2017-11-18

## 2017-11-18 RX ORDER — SODIUM CHLORIDE 9 MG/ML
INJECTION, SOLUTION INTRAVENOUS CONTINUOUS
Status: DISPENSED | OUTPATIENT
Start: 2017-11-18 | End: 2017-11-18

## 2017-11-18 RX ORDER — ONDANSETRON 2 MG/ML
4 INJECTION INTRAMUSCULAR; INTRAVENOUS EVERY 4 HOURS PRN
Status: DISCONTINUED | OUTPATIENT
Start: 2017-11-18 | End: 2017-11-21 | Stop reason: HOSPADM

## 2017-11-18 RX ORDER — MIDAZOLAM HYDROCHLORIDE 1 MG/ML
INJECTION INTRAMUSCULAR; INTRAVENOUS
Status: COMPLETED
Start: 2017-11-18 | End: 2017-11-18

## 2017-11-18 RX ORDER — LIDOCAINE HYDROCHLORIDE 20 MG/ML
INJECTION, SOLUTION INFILTRATION; PERINEURAL
Status: COMPLETED
Start: 2017-11-18 | End: 2017-11-18

## 2017-11-18 RX ORDER — HEPARIN SODIUM,PORCINE 1000/ML
VIAL (ML) INJECTION
Status: COMPLETED
Start: 2017-11-18 | End: 2017-11-18

## 2017-11-18 RX ORDER — VERAPAMIL HYDROCHLORIDE 2.5 MG/ML
INJECTION, SOLUTION INTRAVENOUS
Status: COMPLETED
Start: 2017-11-18 | End: 2017-11-18

## 2017-11-18 RX ORDER — MAGNESIUM SULFATE HEPTAHYDRATE 40 MG/ML
2 INJECTION, SOLUTION INTRAVENOUS ONCE
Status: COMPLETED | OUTPATIENT
Start: 2017-11-18 | End: 2017-11-18

## 2017-11-18 RX ADMIN — SODIUM CHLORIDE: 9 INJECTION, SOLUTION INTRAVENOUS at 13:38

## 2017-11-18 RX ADMIN — HEPARIN SODIUM: 1000 INJECTION, SOLUTION INTRAVENOUS; SUBCUTANEOUS at 12:17

## 2017-11-18 RX ADMIN — MIDAZOLAM 2 MG: 1 INJECTION INTRAMUSCULAR; INTRAVENOUS at 12:35

## 2017-11-18 RX ADMIN — INSULIN HUMAN 12 UNITS: 100 INJECTION, SOLUTION PARENTERAL at 18:23

## 2017-11-18 RX ADMIN — DEXTROSE MONOHYDRATE 500 ML: 50 INJECTION, SOLUTION INTRAVENOUS at 22:35

## 2017-11-18 RX ADMIN — MIDAZOLAM 0.5 MG: 1 INJECTION INTRAMUSCULAR; INTRAVENOUS at 12:36

## 2017-11-18 RX ADMIN — IOHEXOL 120 ML: 350 INJECTION, SOLUTION INTRAVENOUS at 12:36

## 2017-11-18 RX ADMIN — CARVEDILOL 25 MG: 25 TABLET, FILM COATED ORAL at 08:44

## 2017-11-18 RX ADMIN — ADENOSINE 90 MG: 3 INJECTION, SOLUTION INTRAVENOUS at 12:36

## 2017-11-18 RX ADMIN — LISINOPRIL 5 MG: 5 TABLET ORAL at 08:44

## 2017-11-18 RX ADMIN — MAGNESIUM SULFATE HEPTAHYDRATE 2 G: 40 INJECTION, SOLUTION INTRAVENOUS at 13:37

## 2017-11-18 RX ADMIN — HEPARIN SODIUM 2000 UNITS: 200 INJECTION, SOLUTION INTRAVENOUS at 12:24

## 2017-11-18 RX ADMIN — AMIODARONE HYDROCHLORIDE 0.5 MG/MIN: 50 INJECTION, SOLUTION INTRAVENOUS at 08:45

## 2017-11-18 RX ADMIN — CARVEDILOL 25 MG: 25 TABLET, FILM COATED ORAL at 19:14

## 2017-11-18 RX ADMIN — INSULIN HUMAN 10 UNITS: 100 INJECTION, SUSPENSION SUBCUTANEOUS at 18:26

## 2017-11-18 RX ADMIN — FENTANYL CITRATE 100 MCG: 50 INJECTION, SOLUTION INTRAMUSCULAR; INTRAVENOUS at 12:35

## 2017-11-18 RX ADMIN — NITROGLYCERIN 10 ML: 20 INJECTION INTRAVENOUS at 12:17

## 2017-11-18 RX ADMIN — VERAPAMIL HYDROCHLORIDE 2.5 MG: 2.5 INJECTION, SOLUTION INTRAVENOUS at 12:19

## 2017-11-18 RX ADMIN — LIDOCAINE HYDROCHLORIDE: 20 INJECTION, SOLUTION INFILTRATION; PERINEURAL at 12:18

## 2017-11-18 ASSESSMENT — PAIN SCALES - GENERAL
PAINLEVEL_OUTOF10: 0

## 2017-11-18 ASSESSMENT — ENCOUNTER SYMPTOMS
VOMITING: 0
NAUSEA: 0
NECK PAIN: 0
BACK PAIN: 0
ABDOMINAL PAIN: 0

## 2017-11-18 NOTE — PROGRESS NOTES
"Date of Service: 11/18/2017  Chief Complaint:  Chief Complaint   Patient presents with   • Chest Pain     Pt. had a GLF out of bed today. When EMS arrived, pt. was diaphorhetic and in V-Tach. Pt. given 150 of Amiodarone by EMS.    47-year-old gentleman seen urgently   in the ER at the request of Dr. Sanchez for evaluation of VT.  The patient has   a history of a dilated cardiomyopathy, which was first diagnosed in 2005.    According to records, at that time, his EF was 15% and was felt that his LV   dysfunction may have been secondary to untreated hypertension.  In 2007, his   EF had increased to 45% with medical therapy  Interval History:  Doing well w/o cardiac complaints  Waiting for [facility] today  All recent medication, labs, imaging studies and procedures reviewed    Physical Exam   Blood pressure 100/76, pulse 69, temperature 36.2 °C (97.1 °F), resp. rate (!) 22, height 1.93 m (6' 4\"), weight 114 kg (251 lb 5.2 oz), SpO2 96 %.    Constitutional:  He appears well-developed.   HENT: Normocephalic and atraumatic. No scleral icterus.   Neck: No JVD present.   Cardiovascular: Normal rate.  RRR; Exam reveals no gallop and no friction rub. No murmur heard.   Pulmonary/Chest: CTAB coarse   Abdominal: S/NT/ND BS+   Musculoskeletal: He exhibits no edema. Pulses present.  Skin: Skin is warm and dry.       Intake/Output Summary (Last 24 hours) at 11/18/17 0817  Last data filed at 11/18/17 0400   Gross per 24 hour   Intake              240 ml   Output             1300 ml   Net            -1060 ml       LABS:  No results found for: CHOLSTRLTOT, LDL, HDL, TRIGLYCERIDE    Lab Results   Component Value Date/Time    WBC 11.5 (H) 11/18/2017 04:41 AM    RBC 4.74 11/18/2017 04:41 AM    HEMOGLOBIN 14.3 11/18/2017 04:41 AM    HEMATOCRIT 42.1 11/18/2017 04:41 AM    MCV 88.8 11/18/2017 04:41 AM    NEUTSPOLYS 57.10 11/17/2017 06:10 AM    LYMPHOCYTES 29.60 11/17/2017 06:10 AM    MONOCYTES 9.50 11/17/2017 06:10 AM    EOSINOPHILS 2.60 " 11/17/2017 06:10 AM    BASOPHILS 0.70 11/17/2017 06:10 AM     Lab Results   Component Value Date/Time    SODIUM 134 (L) 11/18/2017 04:41 AM    POTASSIUM 3.9 11/18/2017 04:41 AM    CHLORIDE 107 11/18/2017 04:41 AM    CO2 22 11/18/2017 04:41 AM    GLUCOSE 275 (H) 11/18/2017 04:41 AM    BUN 19 11/18/2017 04:41 AM    CREATININE 0.85 11/18/2017 04:41 AM         Lab Results   Component Value Date/Time    ALKPHOSPHAT 33 11/18/2017 04:41 AM    ASTSGOT 64 (H) 11/18/2017 04:41 AM    ALTSGPT 51 (H) 11/18/2017 04:41 AM    TBILIRUBIN 0.7 11/18/2017 04:41 AM      Lab Results   Component Value Date/Time    BNPBTYPENAT 114 (H) 11/17/2017 06:10 AM      No results found for: TSH  Lab Results   Component Value Date/Time    PROTHROMBTM 13.4 11/17/2017 06:10 AM    INR 1.05 11/17/2017 06:10 AM        Medications reviewed    Imaging reviewed    ECHO(11/17/2017):Severely reduced left ventricular systolic function.Left ventricular ejection fraction is visually estimated to be 30%. Global hypokinesis.  Mild concentric left ventricular hypertrophy.  Left ventricle is mildly dilated.  Mildly dilated left atrium.  Compared to the images of the prior study done 7/17/13, left   ventricular ejection fraction is worse.    Impressions:Recommendations:  1.  Sustained ventricular tachycardia.  Patient presented with sudden onset of   loss of consciousness and perfusing ventricular tachycardia, was successfully   cardioverted.  2.  History of cardiomyopathy, presumed initially in 2005 to be secondary to   untreated hypertension.  3.  History of hypertension.  4.  History of hyperlipidemia.  5.  History of sleep apnea.  6.  Chronic left bundle-branch block.  7.  Diabetes mellitus type 2.      EPS requestedLHC and possible device implantation.  C scheduled for today  Will follow  Thx

## 2017-11-18 NOTE — CARE PLAN
Problem: Communication  Goal: The ability to communicate needs accurately and effectively will improve  Oriented patient and family to unit policies and routine. Educated patient about physician routines, rounds, and potential procedures. Questions answered.    Problem: Safety  Goal: Will remain free from falls  Patient in bed, locked and low position, lower rails raised, call light inpatient hand, demonstrates how to use.

## 2017-11-18 NOTE — CATH LAB
Immediate Post-Operative Note      PreOp Diagnosis:   1. VT  2. OHCA  3. NICM, LVEF 30%    PostOp Diagnosis:   1. Nonobstructive CAD  2. 60% mLAD, iFR and FFR normal      Procedure(s) :  Coronary Angiography, Left Heart Catheterization, iFR, FFR    Surgeon(s):  Dario Joyner M.D.    Type of Anesthesia: Moderate Sedation    Specimen: None    Complications: none apparent      Dario Joyner M.D.  11/18/2017 12:35 PM

## 2017-11-19 LAB
ANION GAP SERPL CALC-SCNC: 9 MMOL/L (ref 0–11.9)
BUN SERPL-MCNC: 18 MG/DL (ref 8–22)
CALCIUM SERPL-MCNC: 8.1 MG/DL (ref 8.5–10.5)
CHLORIDE SERPL-SCNC: 110 MMOL/L (ref 96–112)
CO2 SERPL-SCNC: 18 MMOL/L (ref 20–33)
CREAT SERPL-MCNC: 0.79 MG/DL (ref 0.5–1.4)
EKG IMPRESSION: NORMAL
ERYTHROCYTE [DISTWIDTH] IN BLOOD BY AUTOMATED COUNT: 42.9 FL (ref 35.9–50)
GFR SERPL CREATININE-BSD FRML MDRD: >60 ML/MIN/1.73 M 2
GLUCOSE BLD-MCNC: 247 MG/DL (ref 65–99)
GLUCOSE BLD-MCNC: 253 MG/DL (ref 65–99)
GLUCOSE BLD-MCNC: 319 MG/DL (ref 65–99)
GLUCOSE BLD-MCNC: 352 MG/DL (ref 65–99)
GLUCOSE SERPL-MCNC: 275 MG/DL (ref 65–99)
HCT VFR BLD AUTO: 38.9 % (ref 42–52)
HGB BLD-MCNC: 13.1 G/DL (ref 14–18)
LV EJECT FRACT  99904: 35
LV EJECT FRACT MOD 2C 99903: 42.07
LV EJECT FRACT MOD 4C 99902: 55.88
LV EJECT FRACT MOD BP 99901: 46.49
MCH RBC QN AUTO: 30.8 PG (ref 27–33)
MCHC RBC AUTO-ENTMCNC: 33.7 G/DL (ref 33.7–35.3)
MCV RBC AUTO: 91.3 FL (ref 81.4–97.8)
PLATELET # BLD AUTO: 120 K/UL (ref 164–446)
PMV BLD AUTO: 10.5 FL (ref 9–12.9)
POTASSIUM SERPL-SCNC: 3.7 MMOL/L (ref 3.6–5.5)
RBC # BLD AUTO: 4.26 M/UL (ref 4.7–6.1)
SODIUM SERPL-SCNC: 137 MMOL/L (ref 135–145)
WBC # BLD AUTO: 9.7 K/UL (ref 4.8–10.8)

## 2017-11-19 PROCEDURE — 700102 HCHG RX REV CODE 250 W/ 637 OVERRIDE(OP): Performed by: HOSPITALIST

## 2017-11-19 PROCEDURE — 93005 ELECTROCARDIOGRAM TRACING: CPT | Performed by: INTERNAL MEDICINE

## 2017-11-19 PROCEDURE — 93010 ELECTROCARDIOGRAM REPORT: CPT | Performed by: INTERNAL MEDICINE

## 2017-11-19 PROCEDURE — 82962 GLUCOSE BLOOD TEST: CPT | Mod: 91

## 2017-11-19 PROCEDURE — 700105 HCHG RX REV CODE 258

## 2017-11-19 PROCEDURE — 770022 HCHG ROOM/CARE - ICU (200)

## 2017-11-19 PROCEDURE — A9270 NON-COVERED ITEM OR SERVICE: HCPCS | Performed by: HOSPITALIST

## 2017-11-19 PROCEDURE — 700111 HCHG RX REV CODE 636 W/ 250 OVERRIDE (IP)

## 2017-11-19 PROCEDURE — 99232 SBSQ HOSP IP/OBS MODERATE 35: CPT | Performed by: HOSPITALIST

## 2017-11-19 PROCEDURE — 80048 BASIC METABOLIC PNL TOTAL CA: CPT

## 2017-11-19 PROCEDURE — 85027 COMPLETE CBC AUTOMATED: CPT

## 2017-11-19 RX ORDER — INSULIN GLARGINE 100 [IU]/ML
15 INJECTION, SOLUTION SUBCUTANEOUS 2 TIMES DAILY
Status: DISCONTINUED | OUTPATIENT
Start: 2017-11-19 | End: 2017-11-20

## 2017-11-19 RX ADMIN — INSULIN HUMAN 10 UNITS: 100 INJECTION, SOLUTION PARENTERAL at 11:39

## 2017-11-19 RX ADMIN — INSULIN HUMAN 7 UNITS: 100 INJECTION, SOLUTION PARENTERAL at 00:36

## 2017-11-19 RX ADMIN — INSULIN HUMAN 7 UNITS: 100 INJECTION, SOLUTION PARENTERAL at 05:27

## 2017-11-19 RX ADMIN — LISINOPRIL 5 MG: 5 TABLET ORAL at 08:00

## 2017-11-19 RX ADMIN — CARVEDILOL 25 MG: 25 TABLET, FILM COATED ORAL at 20:10

## 2017-11-19 RX ADMIN — INSULIN HUMAN 10 UNITS: 100 INJECTION, SUSPENSION SUBCUTANEOUS at 05:27

## 2017-11-19 RX ADMIN — INSULIN HUMAN 4 UNITS: 100 INJECTION, SOLUTION PARENTERAL at 17:50

## 2017-11-19 RX ADMIN — AMIODARONE HYDROCHLORIDE 0.5 MG/MIN: 50 INJECTION, SOLUTION INTRAVENOUS at 00:27

## 2017-11-19 RX ADMIN — AMIODARONE HYDROCHLORIDE 0.5 MG/MIN: 50 INJECTION, SOLUTION INTRAVENOUS at 17:53

## 2017-11-19 RX ADMIN — CARVEDILOL 25 MG: 25 TABLET, FILM COATED ORAL at 08:00

## 2017-11-19 RX ADMIN — INSULIN GLARGINE 15 UNITS: 100 INJECTION, SOLUTION SUBCUTANEOUS at 10:41

## 2017-11-19 RX ADMIN — INSULIN GLARGINE 15 UNITS: 100 INJECTION, SOLUTION SUBCUTANEOUS at 20:12

## 2017-11-19 ASSESSMENT — ENCOUNTER SYMPTOMS
COUGH: 0
CHILLS: 0
DIAPHORESIS: 0
BACK PAIN: 0
VOMITING: 0
HEMOPTYSIS: 0
NAUSEA: 0
ABDOMINAL PAIN: 0
NECK PAIN: 0

## 2017-11-19 ASSESSMENT — PAIN SCALES - GENERAL
PAINLEVEL_OUTOF10: 0

## 2017-11-19 NOTE — PROGRESS NOTES
Monitor summary  .20/.08/.40  SR with frequent PVC's BBB and a first degree heart block rate 70-80

## 2017-11-19 NOTE — CARE PLAN
Problem: Communication  Goal: The ability to communicate needs accurately and effectively will improve  RN listened to patients concerns regarding not getting AICD today, RN expressed barriers to immediate AICD placement and educated about aicd placement process.    Problem: Infection  Goal: Will remain free from infection  Patient assessed for s/sx infection, none noted. Handwashing encouraged.    Problem: Venous Thromboembolism (VTW)/Deep Vein Thrombosis (DVT) Prevention:  Goal: Patient will participate in Venous Thrombosis (VTE)/Deep Vein Thrombosis (DVT)Prevention Measures  Patient continues to refuse SCDs, lovenox was held for cath procedure. Patient walked 6x today, increased mobility to decrease risk of DVT.

## 2017-11-19 NOTE — PROGRESS NOTES
[facility]ist Progress Note    Date of Service: 2017    Chief Complaint  47 y.o. male admitted 2017 with chest pain    Interval Problem Update  Patient with chest pain and weakness at home  EMS found him in perfusing ventricular tachycardia  Received amiodarone and was cardioverted in the emergency room  No recurrence of ventricular tachycardia  Patient is chest pain-free, no palpitations  No nausea or vomiting  Nothing by mouth for procedures, he is hungry  Blood sugars have been uncontrolled with fingersticks to the 270s    Consultants/Specialty  Cardiology, electrophysiology    Disposition  Can likely be discharged home after AICD placed    Patient discussed on rounds with intensivist, RN, respiratory therapy, and pharmacy.      Review of Systems   Gastrointestinal: Negative for abdominal pain, nausea and vomiting.   Musculoskeletal: Negative for back pain and neck pain.   Skin: Negative for itching and rash.      Physical Exam  Laboratory/Imaging   Hemodynamics  Temp (24hrs), Av.5 °C (97.7 °F), Min:36.2 °C (97.1 °F), Max:36.8 °C (98.2 °F)   Temperature: 36.4 °C (97.6 °F)  Pulse  Av.6  Min: 37  Max: 189 Heart Rate (Monitored): 85  NIBP: 118/70      Respiratory      Respiration: 18, Pulse Oximetry: 95 %        RUL Breath Sounds: Clear, RML Breath Sounds: Clear, RLL Breath Sounds: Clear, DILLAN Breath Sounds: Clear, LLL Breath Sounds: Clear    Fluids    Intake/Output Summary (Last 24 hours) at 17 1628  Last data filed at 17 1600   Gross per 24 hour   Intake              790 ml   Output             2350 ml   Net            -1560 ml       Nutrition  Orders Placed This Encounter   Procedures   • Diet Order     Standing Status:   Standing     Number of Occurrences:   1     Order Specific Question:   Diet:     Answer:   Cardiac [6]     Physical Exam   Constitutional: He is oriented to person, place, and time. He appears well-developed and well-nourished. No distress.   HENT:   Head:  Normocephalic and atraumatic.   Eyes: Conjunctivae and EOM are normal. Right eye exhibits no discharge. Left eye exhibits no discharge.   Cardiovascular: Normal rate, regular rhythm and intact distal pulses.    No murmur heard.  Pulmonary/Chest: Effort normal and breath sounds normal. No respiratory distress. He has no wheezes.   Abdominal: Soft. Bowel sounds are normal. He exhibits no distension. There is no tenderness. There is no rebound.   Musculoskeletal: Normal range of motion. He exhibits no edema.   Neurological: He is alert and oriented to person, place, and time. No cranial nerve deficit.   Skin: Skin is warm and dry. He is not diaphoretic. No erythema.   Psychiatric: He has a normal mood and affect.       Recent Labs      11/17/17   0610  11/18/17   0441   WBC  12.9*  11.5*   RBC  4.68*  4.74   HEMOGLOBIN  14.2  14.3   HEMATOCRIT  42.5  42.1   MCV  90.8  88.8   MCH  30.3  30.2   MCHC  33.4*  34.0   RDW  43.9  42.1   PLATELETCT  159*  180   MPV  11.9  11.0     Recent Labs      11/17/17   0610  11/17/17   1845  11/18/17   0441   SODIUM  142  137  134*   POTASSIUM  3.2*  4.7  3.9   CHLORIDE  113*  105  107   CO2  19*  20  22   GLUCOSE  300*  232*  275*   BUN  15  14  19   CREATININE  0.83  0.79  0.85   CALCIUM  7.4*  9.0  8.8     Recent Labs      11/17/17   0610   APTT  22.0*   INR  1.05     Recent Labs      11/17/17   0610   BNPBTYPENAT  114*              Assessment/Plan     * V-tach (CMS-HCC)- (present on admission)   Assessment & Plan    Patient received amiodarone and required cardioversion in the emergency room  No recurrence of VT  Cardiac cath and AICD planned during this admission  Maintain electrolytes  Amiodarone drip        Chronic combined systolic and diastolic heart failure (CMS-HCC)- (present on admission)   Assessment & Plan    Financial barriers forget outpatient follow-up  No acute exacerbation of heart failure  Continue Coreg, continue lisinopril        Hypokalemia- (present on admission)    Assessment & Plan    Replace potassium, replace magnesium        HTN (hypertension)- (present on admission)   Assessment & Plan    Coreg and lisinopril        DM (diabetes mellitus) (CMS-[facility])- (present on admission)   Assessment & Plan    Uncontrolled blood sugars  Add NPH  Insulin sliding scale        Sleep apnea- (present on admission)   Assessment & Plan    s/p UVP            Reviewed items::  Labs reviewed and Medications reviewed  Mccall catheter::  No Mccall

## 2017-11-19 NOTE — PROGRESS NOTES
"Date of Service: 11/19/2017  Chief Complaint:  Chief Complaint   Patient presents with   • Chest Pain     Pt. had a GLF out of bed today. When EMS arrived, pt. was diaphorhetic and in V-Tach. Pt. given 150 of Amiodarone by EMS.    47-year-old gentleman seen urgently   in the ER at the request of Dr. Sanchez for evaluation of VT.  The patient has   a history of a dilated cardiomyopathy, which was first diagnosed in 2005.    According to records, at that time, his EF was 15% and was felt that his LV   dysfunction may have been secondary to untreated hypertension.  In 2007, his   EF had increased to 45% with medical therapy  Interval History:  LHC: 1. Nonobstructive CAD  2. 60% mLAD, iFR and FFR normal    Doing well w/o cardiac complaints    All recent medication, labs, imaging studies and procedures reviewed    Physical Exam   Blood pressure 100/76, pulse 69, temperature 36.4 °C (97.5 °F), resp. rate 12 height 1.93 m (6' 4\"), weight 114 kg (251 lb 5.2 oz), SpO2 96 %.    Constitutional:  He appears well-developed.   HENT: Normocephalic and atraumatic. No scleral icterus.   Neck: No JVD present.   Cardiovascular: Normal rate.  RRR; Exam reveals no gallop and no friction rub. No murmur heard.   Pulmonary/Chest: CTAB coarse   Abdominal: S/NT/ND BS+   Musculoskeletal: He exhibits no edema. Pulses present.  Skin: Skin is warm and dry.    Intake/Output Summary (Last 24 hours) at 11/19/17 0829  Last data filed at 11/19/17 0800   Gross per 24 hour   Intake             2257 ml   Output             3850 ml   Net            -1593 ml       LABS:  No results found for: CHOLSTRLTOT, LDL, HDL, TRIGLYCERIDE    Lab Results   Component Value Date/Time    WBC 9.7 11/19/2017 03:23 AM    RBC 4.26 (L) 11/19/2017 03:23 AM    HEMOGLOBIN 13.1 (L) 11/19/2017 03:23 AM    HEMATOCRIT 38.9 (L) 11/19/2017 03:23 AM    MCV 91.3 11/19/2017 03:23 AM    NEUTSPOLYS 57.10 11/17/2017 06:10 AM    LYMPHOCYTES 29.60 11/17/2017 06:10 AM    MONOCYTES 9.50 " 11/17/2017 06:10 AM    EOSINOPHILS 2.60 11/17/2017 06:10 AM    BASOPHILS 0.70 11/17/2017 06:10 AM     Lab Results   Component Value Date/Time    SODIUM 137 11/19/2017 03:23 AM    POTASSIUM 3.7 11/19/2017 03:23 AM    CHLORIDE 110 11/19/2017 03:23 AM    CO2 18 (L) 11/19/2017 03:23 AM    GLUCOSE 275 (H) 11/19/2017 03:23 AM    BUN 18 11/19/2017 03:23 AM    CREATININE 0.79 11/19/2017 03:23 AM         Lab Results   Component Value Date/Time    ALKPHOSPHAT 33 11/18/2017 04:41 AM    ASTSGOT 64 (H) 11/18/2017 04:41 AM    ALTSGPT 51 (H) 11/18/2017 04:41 AM    TBILIRUBIN 0.7 11/18/2017 04:41 AM      Lab Results   Component Value Date/Time    BNPBTYPENAT 114 (H) 11/17/2017 06:10 AM      No results found for: TSH  Lab Results   Component Value Date/Time    PROTHROMBTM 13.4 11/17/2017 06:10 AM    INR 1.05 11/17/2017 06:10 AM        Medications reviewed    Imaging reviewed    ECHO(11/17/2017):Severely reduced left ventricular systolic function.Left ventricular ejection fraction is visually estimated to be 30%. Global hypokinesis.  Mild concentric left ventricular hypertrophy.  Left ventricle is mildly dilated.  Mildly dilated left atrium.  Compared to the images of the prior study done 7/17/13, left   ventricular ejection fraction is worse.     Impressions:Recommendations:  1.  Sustained ventricular tachycardia.  Patient presented with sudden onset of   loss of consciousness and perfusing ventricular tachycardia, was successfully   cardioverted.  2.  History of cardiomyopathy, presumed initially in 2005 to be secondary to   untreated hypertension.  3.  History of hypertension.  4.  History of hyperlipidemia.  5.  History of sleep apnea.  6.  Chronic left bundle-branch block.  7.  Diabetes mellitus type 2.      EPS requested LHC and possible device implantation.  LHC  Reviewed; EPS may consider  ICD  Will follow  Thx

## 2017-11-19 NOTE — CARE PLAN
Problem: Knowledge Deficit  Goal: Knowledge of disease process/condition, treatment plan, diagnostic tests, and medications will improve  Outcome: PROGRESSING AS EXPECTED  Pt given information about POC and disease process, questions and concerns answered, pt verbalyed an understanding of this information...

## 2017-11-19 NOTE — CARE PLAN
Problem: Nutritional:  Goal: Patient to verbalize or demonstrate understanding of diet  Outcome: MET Date Met: 11/19/17  Provided heart healthy diet education per consult. Pt verbalized understanding, informational handout left at bedside.     RD available prn

## 2017-11-19 NOTE — PROGRESS NOTES
[facility]    Date of Service: 2017    Chief Complaint  47 y.o. male admitted 2017 with chest pain    Interval Problem Update  Patient with chest pain and weakness at home  EMS found him in perfusing ventricular tachycardia  Received amiodarone and was cardioverted in the emergency room  Cardiac cath 2017 with nonobstructive disease  Patient monitored in the ICU overnight, on amiodarone drip  In sinus rhythm, no recurrence of VT  No chest pain and no palpitations  Blood sugars remain uncontrolled with fingersticks to 270s    Consultants/Specialty  Cardiology, electrophysiology    Disposition  Can likely be discharged home after AICD placed    Patient discussed on rounds with intensivist, RN, respiratory therapy, and pharmacy.      Review of Systems   Constitutional: Negative for chills, diaphoresis and malaise/fatigue.   Respiratory: Negative for cough and hemoptysis.    Gastrointestinal: Negative for abdominal pain, nausea and vomiting.   Musculoskeletal: Negative for back pain and neck pain.      Physical Exam  Laboratory/Imaging   Hemodynamics  Temp (24hrs), Av.5 °C (97.7 °F), Min:36 °C (96.8 °F), Max:37.1 °C (98.8 °F)   Temperature: 36.4 °C (97.5 °F)  Pulse  Av.9  Min: 27  Max: 189 Heart Rate (Monitored): 69  NIBP: 129/73      Respiratory      Respiration: (!) 29, Pulse Oximetry: 96 %        RUL Breath Sounds: Clear, RML Breath Sounds: Clear, RLL Breath Sounds: Clear, DILLAN Breath Sounds: Clear, LLL Breath Sounds: Clear    Fluids    Intake/Output Summary (Last 24 hours) at 17 0844  Last data filed at 17 0800   Gross per 24 hour   Intake             2257 ml   Output             3850 ml   Net            -1593 ml       Nutrition  Orders Placed This Encounter   Procedures   • Diet Order     Standing Status:   Standing     Number of Occurrences:   1     Order Specific Question:   Diet:     Answer:   Cardiac [6]     Physical Exam   Constitutional: He is  oriented to person, place, and time. He appears well-developed and well-nourished. No distress.   HENT:   Head: Normocephalic and atraumatic.   Right Ear: External ear normal.   Left Ear: External ear normal.   Eyes: Conjunctivae are normal. No scleral icterus.   Cardiovascular: Normal rate, regular rhythm, normal heart sounds and intact distal pulses.    No murmur heard.  Pulmonary/Chest: Effort normal and breath sounds normal. No respiratory distress. He has no wheezes.   Abdominal: Soft. Bowel sounds are normal. He exhibits no distension. There is no tenderness. There is no rebound and no guarding.   Musculoskeletal: Normal range of motion. He exhibits no edema.   Neurological: He is alert and oriented to person, place, and time. No cranial nerve deficit.   Skin: Skin is warm and dry. No erythema.   Psychiatric: He has a normal mood and affect.       Recent Labs      11/17/17   0610  11/18/17   0441  11/19/17   0323   WBC  12.9*  11.5*  9.7   RBC  4.68*  4.74  4.26*   HEMOGLOBIN  14.2  14.3  13.1*   HEMATOCRIT  42.5  42.1  38.9*   MCV  90.8  88.8  91.3   MCH  30.3  30.2  30.8   MCHC  33.4*  34.0  33.7   RDW  43.9  42.1  42.9   PLATELETCT  159*  180  120*   MPV  11.9  11.0  10.5     Recent Labs      11/17/17   1845  11/18/17   0441  11/19/17   0323   SODIUM  137  134*  137   POTASSIUM  4.7  3.9  3.7   CHLORIDE  105  107  110   CO2  20  22  18*   GLUCOSE  232*  275*  275*   BUN  14  19  18   CREATININE  0.79  0.85  0.79   CALCIUM  9.0  8.8  8.1*     Recent Labs      11/17/17   0610   APTT  22.0*   INR  1.05     Recent Labs      11/17/17   0610   BNPBTYPENAT  114*              Assessment/Plan     * V-tach (CMS-HCC)- (present on admission)   Assessment & Plan    Patient received amiodarone and required cardioversion in the emergency room  No recurrence of VT since he has been on amiodarone drip  Cardiac cath shows only nonobstructive coronary artery disease disease  Maintain electrolytes  Amiodarone drip  AICD         DM (diabetes mellitus) (CMS-HCC)- (present on admission)   Assessment & Plan    Blood sugars are uncontrolled  I discussed with the patient, apparently his insurance coverage and Lantus in the past  I do not think were him improve his blood sugar control with long-acting insulin  Start twice daily Lantus for now, I expect the dose will need to be increased        Chronic combined systolic and diastolic heart failure (CMS-HCC)- (present on admission)   Assessment & Plan    No acute exacerbation of heart failure  Continue Coreg, continue lisinopril        Hypokalemia- (present on admission)   Assessment & Plan    Replace potassium, replace magnesium        HTN (hypertension)- (present on admission)   Assessment & Plan    Coreg and lisinopril        Sleep apnea- (present on admission)   Assessment & Plan    s/p UVP            Reviewed items::  Labs reviewed and Medications reviewed  Mccall catheter::  No Mccall

## 2017-11-20 ENCOUNTER — APPOINTMENT (OUTPATIENT)
Dept: RADIOLOGY | Facility: MEDICAL CENTER | Age: 47
DRG: 224 | End: 2017-11-20
Attending: INTERNAL MEDICINE
Payer: COMMERCIAL

## 2017-11-20 LAB
GLUCOSE BLD-MCNC: 228 MG/DL (ref 65–99)
GLUCOSE BLD-MCNC: 283 MG/DL (ref 65–99)
GLUCOSE BLD-MCNC: 296 MG/DL (ref 65–99)
GLUCOSE BLD-MCNC: 302 MG/DL (ref 65–99)

## 2017-11-20 PROCEDURE — 304952 HCHG R 2 PADS

## 2017-11-20 PROCEDURE — 99152 MOD SED SAME PHYS/QHP 5/>YRS: CPT

## 2017-11-20 PROCEDURE — C2628 CATHETER, OCCLUSION: HCPCS

## 2017-11-20 PROCEDURE — A9270 NON-COVERED ITEM OR SERVICE: HCPCS | Performed by: INTERNAL MEDICINE

## 2017-11-20 PROCEDURE — 02H43KZ INSERTION OF DEFIBRILLATOR LEAD INTO CORONARY VEIN, PERCUTANEOUS APPROACH: ICD-10-PCS | Performed by: INTERNAL MEDICINE

## 2017-11-20 PROCEDURE — 75561 CARDIAC MRI FOR MORPH W/DYE: CPT

## 2017-11-20 PROCEDURE — 700117 HCHG RX CONTRAST REV CODE 255: Performed by: HOSPITALIST

## 2017-11-20 PROCEDURE — C1892 INTRO/SHEATH,FIXED,PEEL-AWAY: HCPCS

## 2017-11-20 PROCEDURE — C1894 INTRO/SHEATH, NON-LASER: HCPCS

## 2017-11-20 PROCEDURE — C1887 CATHETER, GUIDING: HCPCS

## 2017-11-20 PROCEDURE — 700102 HCHG RX REV CODE 250 W/ 637 OVERRIDE(OP): Performed by: HOSPITALIST

## 2017-11-20 PROCEDURE — 33225 L VENTRIC PACING LEAD ADD-ON: CPT

## 2017-11-20 PROCEDURE — 02HK3KZ INSERTION OF DEFIBRILLATOR LEAD INTO RIGHT VENTRICLE, PERCUTANEOUS APPROACH: ICD-10-PCS | Performed by: INTERNAL MEDICINE

## 2017-11-20 PROCEDURE — C1900 LEAD, CORONARY VENOUS: HCPCS

## 2017-11-20 PROCEDURE — 700117 HCHG RX CONTRAST REV CODE 255: Performed by: INTERNAL MEDICINE

## 2017-11-20 PROCEDURE — 93005 ELECTROCARDIOGRAM TRACING: CPT | Performed by: INTERNAL MEDICINE

## 2017-11-20 PROCEDURE — C1882 AICD, OTHER THAN SING/DUAL: HCPCS

## 2017-11-20 PROCEDURE — C1898 LEAD, PMKR, OTHER THAN TRANS: HCPCS

## 2017-11-20 PROCEDURE — 770022 HCHG ROOM/CARE - ICU (200)

## 2017-11-20 PROCEDURE — A9577 INJ MULTIHANCE: HCPCS | Performed by: HOSPITALIST

## 2017-11-20 PROCEDURE — 93641 EP EVL 1/2CHMB PAC CVDFB TST: CPT

## 2017-11-20 PROCEDURE — C1769 GUIDE WIRE: HCPCS

## 2017-11-20 PROCEDURE — 93010 ELECTROCARDIOGRAM REPORT: CPT | Performed by: INTERNAL MEDICINE

## 2017-11-20 PROCEDURE — 700111 HCHG RX REV CODE 636 W/ 250 OVERRIDE (IP)

## 2017-11-20 PROCEDURE — 02H63KZ INSERTION OF DEFIBRILLATOR LEAD INTO RIGHT ATRIUM, PERCUTANEOUS APPROACH: ICD-10-PCS | Performed by: INTERNAL MEDICINE

## 2017-11-20 PROCEDURE — 99232 SBSQ HOSP IP/OBS MODERATE 35: CPT | Performed by: HOSPITALIST

## 2017-11-20 PROCEDURE — 71010 DX-CHEST-PORTABLE (1 VIEW): CPT

## 2017-11-20 PROCEDURE — 700101 HCHG RX REV CODE 250

## 2017-11-20 PROCEDURE — 82962 GLUCOSE BLOOD TEST: CPT | Mod: 91

## 2017-11-20 PROCEDURE — 700105 HCHG RX REV CODE 258

## 2017-11-20 PROCEDURE — 700102 HCHG RX REV CODE 250 W/ 637 OVERRIDE(OP): Performed by: INTERNAL MEDICINE

## 2017-11-20 PROCEDURE — 305387 HCHG SUTURES

## 2017-11-20 PROCEDURE — C1899 LEAD, PMKR/AICD COMBINATION: HCPCS

## 2017-11-20 PROCEDURE — 304853 HCHG PPM TEST CABLE

## 2017-11-20 PROCEDURE — A9270 NON-COVERED ITEM OR SERVICE: HCPCS | Performed by: HOSPITALIST

## 2017-11-20 PROCEDURE — 0JH609Z INSERTION OF CARDIAC RESYNCHRONIZATION DEFIBRILLATOR PULSE GENERATOR INTO CHEST SUBCUTANEOUS TISSUE AND FASCIA, OPEN APPROACH: ICD-10-PCS | Performed by: INTERNAL MEDICINE

## 2017-11-20 PROCEDURE — 33249 INSJ/RPLCMT DEFIB W/LEAD(S): CPT

## 2017-11-20 PROCEDURE — 99153 MOD SED SAME PHYS/QHP EA: CPT

## 2017-11-20 RX ORDER — AMIODARONE HYDROCHLORIDE 200 MG/1
400 TABLET ORAL TWICE DAILY
Status: DISCONTINUED | OUTPATIENT
Start: 2017-11-20 | End: 2017-11-20

## 2017-11-20 RX ORDER — OXYCODONE HYDROCHLORIDE AND ACETAMINOPHEN 5; 325 MG/1; MG/1
1 TABLET ORAL ONCE
Status: COMPLETED | OUTPATIENT
Start: 2017-11-20 | End: 2017-11-20

## 2017-11-20 RX ORDER — MIDAZOLAM HYDROCHLORIDE 1 MG/ML
INJECTION INTRAMUSCULAR; INTRAVENOUS
Status: COMPLETED
Start: 2017-11-20 | End: 2017-11-20

## 2017-11-20 RX ORDER — DOXYCYCLINE 100 MG/1
100 TABLET ORAL EVERY 12 HOURS
Status: DISCONTINUED | OUTPATIENT
Start: 2017-11-20 | End: 2017-11-21 | Stop reason: HOSPADM

## 2017-11-20 RX ORDER — LIDOCAINE HYDROCHLORIDE 10 MG/ML
INJECTION, SOLUTION INFILTRATION; PERINEURAL
Status: COMPLETED
Start: 2017-11-20 | End: 2017-11-20

## 2017-11-20 RX ORDER — HYDRALAZINE HYDROCHLORIDE 20 MG/ML
INJECTION INTRAMUSCULAR; INTRAVENOUS
Status: COMPLETED
Start: 2017-11-20 | End: 2017-11-20

## 2017-11-20 RX ORDER — INSULIN GLARGINE 100 [IU]/ML
25 INJECTION, SOLUTION SUBCUTANEOUS 2 TIMES DAILY
Status: DISCONTINUED | OUTPATIENT
Start: 2017-11-20 | End: 2017-11-21 | Stop reason: HOSPADM

## 2017-11-20 RX ORDER — BUPIVACAINE HYDROCHLORIDE 2.5 MG/ML
INJECTION, SOLUTION EPIDURAL; INFILTRATION; INTRACAUDAL
Status: COMPLETED
Start: 2017-11-20 | End: 2017-11-20

## 2017-11-20 RX ORDER — SPIRONOLACTONE 25 MG/1
25 TABLET ORAL
Status: DISCONTINUED | OUTPATIENT
Start: 2017-11-20 | End: 2017-11-21 | Stop reason: HOSPADM

## 2017-11-20 RX ADMIN — INSULIN HUMAN 10 UNITS: 100 INJECTION, SOLUTION PARENTERAL at 00:11

## 2017-11-20 RX ADMIN — DOXYCYCLINE 100 MG: 100 TABLET ORAL at 20:26

## 2017-11-20 RX ADMIN — INSULIN HUMAN 10 UNITS: 100 INJECTION, SOLUTION PARENTERAL at 23:54

## 2017-11-20 RX ADMIN — MIDAZOLAM 2 MG: 1 INJECTION INTRAMUSCULAR; INTRAVENOUS at 18:08

## 2017-11-20 RX ADMIN — AMIODARONE HYDROCHLORIDE 400 MG: 200 TABLET ORAL at 10:11

## 2017-11-20 RX ADMIN — MIDAZOLAM 2 MG: 1 INJECTION INTRAMUSCULAR; INTRAVENOUS at 18:24

## 2017-11-20 RX ADMIN — VANCOMYCIN HYDROCHLORIDE 2700 MG: 1 INJECTION, POWDER, LYOPHILIZED, FOR SOLUTION INTRAVENOUS at 17:59

## 2017-11-20 RX ADMIN — MIDAZOLAM 4 MG: 1 INJECTION INTRAMUSCULAR; INTRAVENOUS at 18:00

## 2017-11-20 RX ADMIN — INSULIN GLARGINE 15 UNITS: 100 INJECTION, SOLUTION SUBCUTANEOUS at 08:38

## 2017-11-20 RX ADMIN — CARVEDILOL 25 MG: 25 TABLET, FILM COATED ORAL at 20:26

## 2017-11-20 RX ADMIN — INSULIN HUMAN 4 UNITS: 100 INJECTION, SOLUTION PARENTERAL at 18:57

## 2017-11-20 RX ADMIN — LISINOPRIL 5 MG: 5 TABLET ORAL at 08:15

## 2017-11-20 RX ADMIN — MIDAZOLAM 2 MG: 1 INJECTION INTRAMUSCULAR; INTRAVENOUS at 18:04

## 2017-11-20 RX ADMIN — FENTANYL CITRATE 100 MCG: 50 INJECTION, SOLUTION INTRAMUSCULAR; INTRAVENOUS at 18:01

## 2017-11-20 RX ADMIN — IOHEXOL 20 ML: 350 INJECTION, SOLUTION INTRAVENOUS at 18:38

## 2017-11-20 RX ADMIN — INSULIN GLARGINE 25 UNITS: 100 INJECTION, SOLUTION SUBCUTANEOUS at 19:00

## 2017-11-20 RX ADMIN — GADOBENATE DIMEGLUMINE 30 ML: 529 INJECTION, SOLUTION INTRAVENOUS at 13:24

## 2017-11-20 RX ADMIN — FENTANYL CITRATE 100 MCG: 50 INJECTION, SOLUTION INTRAMUSCULAR; INTRAVENOUS at 18:25

## 2017-11-20 RX ADMIN — OXYCODONE HYDROCHLORIDE AND ACETAMINOPHEN 1 TABLET: 5; 325 TABLET ORAL at 11:21

## 2017-11-20 RX ADMIN — INSULIN HUMAN 7 UNITS: 100 INJECTION, SOLUTION PARENTERAL at 06:27

## 2017-11-20 RX ADMIN — LIDOCAINE HYDROCHLORIDE 20 ML: 10 INJECTION, SOLUTION INFILTRATION; PERINEURAL at 17:59

## 2017-11-20 RX ADMIN — DEXTROSE MONOHYDRATE 500 ML: 50 INJECTION, SOLUTION INTRAVENOUS at 00:07

## 2017-11-20 RX ADMIN — HYDRALAZINE HYDROCHLORIDE 10 MG: 20 INJECTION, SOLUTION INTRAMUSCULAR; INTRAVENOUS at 18:04

## 2017-11-20 RX ADMIN — BUPIVACAINE HYDROCHLORIDE 20 ML: 2.5 INJECTION, SOLUTION EPIDURAL; INFILTRATION; INTRACAUDAL; PERINEURAL at 17:59

## 2017-11-20 RX ADMIN — CARVEDILOL 25 MG: 25 TABLET, FILM COATED ORAL at 08:15

## 2017-11-20 ASSESSMENT — ENCOUNTER SYMPTOMS
ABDOMINAL PAIN: 0
HEMOPTYSIS: 0
DIAPHORESIS: 0
NECK PAIN: 0
VOMITING: 0
FOCAL WEAKNESS: 0
PALPITATIONS: 0
NAUSEA: 0
BACK PAIN: 0
CHILLS: 0
HEMATOCHEZIA: 0
COUGH: 0
HEMATEMESIS: 0

## 2017-11-20 ASSESSMENT — PAIN SCALES - GENERAL
PAINLEVEL_OUTOF10: 0

## 2017-11-20 NOTE — CARE PLAN
Problem: Safety  Goal: Will remain free from falls  RN reassessed mobility, encouraged mobilization, set up patient shower and moved obstacles to reduce risk of falls.    Problem: Infection  Goal: Will remain free from infection  Handwashing, gloving, scrub the hub 15 seconds prior to access. Patient and family encouraged to wash hands.

## 2017-11-20 NOTE — CARE PLAN
Problem: Knowledge Deficit  Goal: Knowledge of disease process/condition, treatment plan, diagnostic tests, and medications will improve  Outcome: PROGRESSING AS EXPECTED  Pt given information about POC and disease process in addition to info about AICD placement questions and concerns answered, pt verbalyzed an understanding of this....

## 2017-11-20 NOTE — PROGRESS NOTES
Patient transported to cath lab with RN and patient transporter at 1410 without incident. Zoll monitor in use for transport.

## 2017-11-20 NOTE — PROGRESS NOTES
Patient off floor to MRI & CT with RN and patient transporter. NK transport monitor in use. Patient off floor at 1135, returned to floor at 1350. IV infiltrated while at CT; IV removed and hot pack applied.

## 2017-11-20 NOTE — PROGRESS NOTES
Cardiology Follow-up Consult Note    Date of note:    11/20/2017      Consulting Physician: Walt Pollard M.D.    Patient ID:    Name:   Richard Franklin   YOB: 1970  Age:   47 y.o.  male   MRN:   [id number]      Reason for Consultation: Ventricular tachycardia    HPI/Patient ID:    Patient is a 47-year-old gentleman seen urgently in the ER at the request of Dr. Sanchez for evaluation of VT.  The patient has a history of a dilated cardiomyopathy, which was first diagnosed in 2005. According to records, at that time, his EF was 15% and was felt that his LV dysfunction may have been secondary to untreated hypertension.  In 2007, his EF had increased to 45% with medical therapy.  The patient has a history of sleep apnea that was treated with palato-uvulopharyngoplasty.  He currently does not wear a BiPAP mask.  The patient has been noncompliant with followup and was seen in our office by Dr. Marr in 2013 and most recently by Dr. Patrick in March 2016.    On 11/17/17 he experienced an episode of Syncope and EMS found him in perfusing ventricular tachycardia which responded to cardioversion x 1.  He is currently awaiting ICD placement.    Interim Events:  -Asymptomatic overnight without chest pain, palpitations, lightheadedness.   -Frequent ectopy on telemetry monitoring.   -cardiac MRI today, then ICD          Review of Systems   Gastrointestinal: Negative for hematemesis, hematochezia and melena.   Genitourinary: Negative for hematuria.   Neurological: Negative for focal weakness.        All others reviewed and negative.      Current Facility-Administered Medications   Medication Dose Route Frequency Provider Last Rate Last Dose   • insulin glargine (LANTUS) injection 15 Units  15 Units Subcutaneous BID Walt Pollard M.D.   15 Units at 11/19/17 2012   • ondansetron (ZOFRAN) syringe/vial injection 4 mg  4 mg Intravenous Q4HRS PRN Dario Joyner M.D.       • amiodarone (CORDARONE) 450 mg in D5W 250 mL  Infusion  0.5-1 mg/min Intravenous Continuous Jose AshbyD 17 mL/hr at 11/19/17 1753 0.5 mg/min at 11/19/17 1753   • D5W infusion 500 mL  500 mL Intravenous Continuous Letty Nevarez PharmD 30 mL/hr at 11/20/17 0007 500 mL at 11/20/17 0007   • carvedilol (COREG) tablet 25 mg  25 mg Oral BID Sara Watson M.D.   25 mg at 11/19/17 2010   • lisinopril (PRINIVIL) 10 MG tablet 5 mg  5 mg Oral DAILY Sara Watson M.D.   5 mg at 11/19/17 0800   • senna-docusate (PERICOLACE or SENOKOT S) 8.6-50 MG per tablet 2 Tab  2 Tab Oral BID Sara Watson M.D.   Stopped at 11/17/17 2100    And   • polyethylene glycol/lytes (MIRALAX) PACKET 1 Packet  1 Packet Oral QDAY PRN Sara Watson M.D.        And   • magnesium hydroxide (MILK OF MAGNESIA) suspension 30 mL  30 mL Oral QDAY PRN Sara Watson M.D.        And   • bisacodyl (DULCOLAX) suppository 10 mg  10 mg Rectal QDAY PRN Sara Watson M.D.       • labetalol (NORMODYNE,TRANDATE) injection 10 mg  10 mg Intravenous Q4HRS PRN Sara Watson M.D.       • promethazine (PHENERGAN) tablet 12.5-25 mg  12.5-25 mg Oral Q4HRS PRN Sara Watson M.D.       • promethazine (PHENERGAN) suppository 12.5-25 mg  12.5-25 mg Rectal Q4HRS PRN Sara Watson M.D.       • prochlorperazine (COMPAZINE) injection 5-10 mg  5-10 mg Intravenous Q4HRS PRN Sara Watson M.D.       • ondansetron (ZOFRAN ODT) dispertab 4 mg  4 mg Oral Q4HRS PRN Sara Watson M.D.       • lorazepam (ATIVAN) tablet 0.5 mg  0.5 mg Oral Q6HRS PRN Sara Watson M.D.        Or   • lorazepam (ATIVAN) injection 0.5 mg  0.5 mg Intravenous Q6HRS PRN Sara Watson M.D.       • acetaminophen (TYLENOL) tablet 650 mg  650 mg Oral Q6HRS PRN Sara Watson M.D.       • insulin regular (HUMULIN R) injection 3-14 Units  3-14 Units Subcutaneous Q6HRS Sara Watson M.D.   7 Units at 11/20/17 0627    And   • glucose 4 g chewable tablet 16 g  16 g Oral Q15 MIN PRN Sara Watson M.D.        And   •  "dextrose 50% (D50W) injection 25 mL  25 mL Intravenous Q15 MIN PRN Sara Watson M.D.             No Known Allergies        Physical Exam  Body mass index is 30.75 kg/m².  Blood pressure 100/76, pulse 67, temperature 37.1 °C (98.8 °F), resp. rate (!) 21, height 1.93 m (6' 4\"), weight 114.6 kg (252 lb 10.4 oz), SpO2 92 %.  Vitals:    11/20/17 0300 11/20/17 0400 11/20/17 0500 11/20/17 0600   BP:       Pulse: 67 (!) 49 69 67   Resp: 15 14 (!) 9 (!) 21   Temp:  37.1 °C (98.8 °F)     SpO2: 97% 97% 97% 92%   Weight:  114.6 kg (252 lb 10.4 oz)     Height:         Oxygen Therapy:  Pulse Oximetry: 92 %, O2 Delivery: None (Room Air)    General: Well appearing and in no apparent distress  Eyes: nl conjunctiva  ENT: OP clear  Neck: JVP 5-6 cm H2O, no carotid bruits  Lungs: normal respiratory effort, CTAB  Heart: irregular rhythm, no murmurs, no rubs or gallops, no edema bilateral lower extremities. No LV/RV heave on cardiac palpatation. 2+ bilateral radial pulses.  2+ bilateral dp pulses.   Abdomen: soft, non tender, non distended, no masses, normal bowel sounds.  No HSM.  Extremities/MSK: no clubbing, no cyanosis  Neurological: No focal sensory deficits  Psychiatric: Appropriate affect, A/O x 3  Skin: Warm extremities      Labs (personally reviewed and notable for):   BNP 11/17 - 114  Trop neg   Creatinine 0.8          Cardiac Imaging and Procedures Review:    EKG dated 11/19/17:  SINUS RHYTHM  MULTIPLE VENTRICULAR PREMATURE COMPLEXES  FIRST DEGREE AV BLOCK  LEFT BUNDLE BRANCH BLOCK  BASELINE WANDER IN LEAD(S) V2      Echo dated 11/18/17:   Left Ventricle  Left ventricle is moderately dilated. Moderate concentric left   ventricular hypertrophy. Moderately reduced left ventricular systolic   function. Left ventricular ejection fraction is visually estimated to   be 30 to 35%. Global hypokinesis with regional variation.    Echo dated 11/17/17:  LV EF:  30    %    FINDINGS  Left Ventricle  Left ventricle is mildly dilated. " Mild concentric left ventricular   hypertrophy. Severely reduced left ventricular systolic function. Left   ventricular ejection fraction is visually estimated to be 30%. Global   hypokinesis.    Right Ventricle  Normal right ventricular size and systolic function.    Right Atrium  Normal right atrial size.    Left Atrium  Mildly dilated left atrium. Left atrial volume index is 36 mL/sq m.    Mitral Valve  Structurally normal mitral valve without significant stenosis or   regurgitation.    Aortic Valve  Structurally normal aortic valve without significant stenosis or   regurgitation.    Tricuspid Valve  Structurally normal tricuspid valve without significant stenosis or   regurgitation.    Pulmonic Valve  The pulmonic valve is not well visualized.    Pericardium  No pericardial effusion seen.    Aorta  Normal ascending aorta.      [facility] (11/18/17):   PreOp Diagnosis:   1. VT  2. OHCA  3. NICM, LVEF 30%     PostOp Diagnosis:   1. Nonobstructive CAD  2. 60% mLAD, iFR and FFR normal    Radiology test Review:  CXR:   The heart is enlarged. The mediastinum and kelsey are unremarkable. The lungs are well expanded and show no evidence of infiltrate or other acute process. There is no obvious pleural effusion. The bony thorax is grossly normal.           Impression and Medical Decision Making:  Principal Problem:    V-tach (CMS-HCC) POA: Yes  Active Problems:    Chronic combined systolic and diastolic heart failure (CMS-HCC) (Chronic) POA: Yes    DM (diabetes mellitus) (CMS-HCC) (Chronic) POA: Yes    HTN (hypertension) (Chronic) POA: Yes    Hypokalemia POA: Yes    Sleep apnea (Chronic) POA: Yes  Resolved Problems:    * No resolved hospital problems. *        Recommendations:  # cardiac MRI to evaluate for cardiac sarcoid or other infiltrative disease as cause for CT and non-ischemic cardiomyopathy  # biventricular ICD after MRI for secondary prevention of VT in setting of non-ischemic cardiomyopathy.   # continue  coreg/lisinopril  # Chest CT to evaluate lung parenchyma if cardiac MRI indicative of sarcoid  # Decrease amiodarone to 400mg PO daily upon discharge  --TSH WNL  --last transaminases were mildly elevated, will repeat  --PFTs as outpatient  # discharge day after ICD placed.  F/u with [facility]  # start spironolactone 25mg PO Daily.     Discussed recommendations with Dr. Barger.     Thank you for allowing me to participate in the care of this patient, I will continue to follow.  Please contact me with any questions.      Frantz Franklin MD  Cardiologist, [facility]   [phone]

## 2017-11-21 ENCOUNTER — APPOINTMENT (OUTPATIENT)
Dept: RADIOLOGY | Facility: MEDICAL CENTER | Age: 47
DRG: 224 | End: 2017-11-21
Attending: HOSPITALIST
Payer: COMMERCIAL

## 2017-11-21 ENCOUNTER — APPOINTMENT (OUTPATIENT)
Dept: RADIOLOGY | Facility: MEDICAL CENTER | Age: 47
DRG: 224 | End: 2017-11-21
Attending: INTERNAL MEDICINE
Payer: COMMERCIAL

## 2017-11-21 VITALS
HEART RATE: 86 BPM | SYSTOLIC BLOOD PRESSURE: 100 MMHG | RESPIRATION RATE: 18 BRPM | HEIGHT: 76 IN | OXYGEN SATURATION: 97 % | BODY MASS INDEX: 30.77 KG/M2 | WEIGHT: 252.65 LBS | DIASTOLIC BLOOD PRESSURE: 76 MMHG | TEMPERATURE: 97.9 F

## 2017-11-21 LAB
ALBUMIN SERPL BCP-MCNC: 3.8 G/DL (ref 3.2–4.9)
ALP SERPL-CCNC: 45 U/L (ref 30–99)
ALT SERPL-CCNC: 23 U/L (ref 2–50)
AST SERPL-CCNC: 15 U/L (ref 12–45)
BILIRUB CONJ SERPL-MCNC: 0.2 MG/DL (ref 0.1–0.5)
BILIRUB INDIRECT SERPL-MCNC: 0.8 MG/DL (ref 0–1)
BILIRUB SERPL-MCNC: 1 MG/DL (ref 0.1–1.5)
EKG IMPRESSION: NORMAL
EKG IMPRESSION: NORMAL
GLUCOSE BLD-MCNC: 234 MG/DL (ref 65–99)
GLUCOSE BLD-MCNC: 297 MG/DL (ref 65–99)
GLUCOSE BLD-MCNC: 345 MG/DL (ref 65–99)
PROT SERPL-MCNC: 6.6 G/DL (ref 6–8.2)

## 2017-11-21 PROCEDURE — 700102 HCHG RX REV CODE 250 W/ 637 OVERRIDE(OP): Performed by: INTERNAL MEDICINE

## 2017-11-21 PROCEDURE — G8978 MOBILITY CURRENT STATUS: HCPCS | Mod: CI

## 2017-11-21 PROCEDURE — 4A023N7 MEASUREMENT OF CARDIAC SAMPLING AND PRESSURE, LEFT HEART, PERCUTANEOUS APPROACH: ICD-10-PCS | Performed by: INTERNAL MEDICINE

## 2017-11-21 PROCEDURE — 80076 HEPATIC FUNCTION PANEL: CPT

## 2017-11-21 PROCEDURE — 700102 HCHG RX REV CODE 250 W/ 637 OVERRIDE(OP): Performed by: HOSPITALIST

## 2017-11-21 PROCEDURE — 4A033BC MEASUREMENT OF ARTERIAL PRESSURE, CORONARY, PERCUTANEOUS APPROACH: ICD-10-PCS | Performed by: INTERNAL MEDICINE

## 2017-11-21 PROCEDURE — 82962 GLUCOSE BLOOD TEST: CPT | Mod: 91

## 2017-11-21 PROCEDURE — A9270 NON-COVERED ITEM OR SERVICE: HCPCS | Performed by: HOSPITALIST

## 2017-11-21 PROCEDURE — A9270 NON-COVERED ITEM OR SERVICE: HCPCS | Performed by: INTERNAL MEDICINE

## 2017-11-21 PROCEDURE — 97161 PT EVAL LOW COMPLEX 20 MIN: CPT

## 2017-11-21 PROCEDURE — 93005 ELECTROCARDIOGRAM TRACING: CPT | Performed by: INTERNAL MEDICINE

## 2017-11-21 PROCEDURE — B2111ZZ FLUOROSCOPY OF MULTIPLE CORONARY ARTERIES USING LOW OSMOLAR CONTRAST: ICD-10-PCS | Performed by: INTERNAL MEDICINE

## 2017-11-21 PROCEDURE — 93010 ELECTROCARDIOGRAM REPORT: CPT | Performed by: INTERNAL MEDICINE

## 2017-11-21 PROCEDURE — B2151ZZ FLUOROSCOPY OF LEFT HEART USING LOW OSMOLAR CONTRAST: ICD-10-PCS | Performed by: INTERNAL MEDICINE

## 2017-11-21 PROCEDURE — 99239 HOSP IP/OBS DSCHRG MGMT >30: CPT | Performed by: HOSPITALIST

## 2017-11-21 PROCEDURE — G8979 MOBILITY GOAL STATUS: HCPCS | Mod: CI

## 2017-11-21 PROCEDURE — G8980 MOBILITY D/C STATUS: HCPCS | Mod: CI

## 2017-11-21 PROCEDURE — 71010 DX-CHEST-PORTABLE (1 VIEW): CPT

## 2017-11-21 RX ORDER — SPIRONOLACTONE 25 MG/1
25 TABLET ORAL DAILY
Qty: 30 TAB | Refills: 2 | Status: SHIPPED | OUTPATIENT
Start: 2017-11-22 | End: 2017-11-29 | Stop reason: SDUPTHER

## 2017-11-21 RX ORDER — DOXYCYCLINE 100 MG/1
100 CAPSULE ORAL 2 TIMES DAILY
Qty: 8 CAP | Refills: 0 | Status: SHIPPED | OUTPATIENT
Start: 2017-11-21 | End: 2017-11-29

## 2017-11-21 RX ORDER — DOXYCYCLINE 100 MG/1
100 TABLET ORAL EVERY 12 HOURS
Qty: 8 TAB | Refills: 0 | Status: SHIPPED
Start: 2017-11-21 | End: 2017-11-29

## 2017-11-21 RX ADMIN — CARVEDILOL 25 MG: 25 TABLET, FILM COATED ORAL at 09:43

## 2017-11-21 RX ADMIN — INSULIN GLARGINE 25 UNITS: 100 INJECTION, SOLUTION SUBCUTANEOUS at 09:41

## 2017-11-21 RX ADMIN — DOXYCYCLINE 100 MG: 100 TABLET ORAL at 09:42

## 2017-11-21 RX ADMIN — SPIRONOLACTONE 25 MG: 25 TABLET ORAL at 11:54

## 2017-11-21 RX ADMIN — INSULIN HUMAN 4 UNITS: 100 INJECTION, SOLUTION PARENTERAL at 06:33

## 2017-11-21 RX ADMIN — INSULIN HUMAN 7 UNITS: 100 INJECTION, SOLUTION PARENTERAL at 11:57

## 2017-11-21 RX ADMIN — LISINOPRIL 5 MG: 5 TABLET ORAL at 09:42

## 2017-11-21 ASSESSMENT — ENCOUNTER SYMPTOMS
SORE THROAT: 0
CLAUDICATION: 0
DOUBLE VISION: 0
HEADACHES: 0
NAUSEA: 0
HEMATOCHEZIA: 0
DIZZINESS: 0
VOMITING: 0
SEIZURES: 0
LOSS OF CONSCIOUSNESS: 0
SPEECH CHANGE: 0
FEVER: 0
PSYCHIATRIC NEGATIVE: 1
BLURRED VISION: 0
PALPITATIONS: 0
ORTHOPNEA: 0
WEIGHT LOSS: 0
CHILLS: 0
HEARTBURN: 0
ABDOMINAL PAIN: 0
PND: 0
MUSCULOSKELETAL NEGATIVE: 1
FOCAL WEAKNESS: 0
HEMATEMESIS: 0
COUGH: 0
FLANK PAIN: 0
WHEEZING: 0
SHORTNESS OF BREATH: 0
SPUTUM PRODUCTION: 0

## 2017-11-21 ASSESSMENT — PAIN SCALES - GENERAL
PAINLEVEL_OUTOF10: 0

## 2017-11-21 ASSESSMENT — COGNITIVE AND FUNCTIONAL STATUS - GENERAL
MOBILITY SCORE: 21
MOVING TO AND FROM BED TO CHAIR: A LITTLE
MOVING FROM LYING ON BACK TO SITTING ON SIDE OF FLAT BED: A LITTLE
SUGGESTED CMS G CODE MODIFIER MOBILITY: CJ
TURNING FROM BACK TO SIDE WHILE IN FLAT BAD: A LITTLE

## 2017-11-21 ASSESSMENT — GAIT ASSESSMENTS
DEVIATION: OTHER (COMMENT)
GAIT LEVEL OF ASSIST: INDEPENDENT

## 2017-11-21 NOTE — PROGRESS NOTES
All lines removed. Both prescriptions given to patient. Pt and wife verbally understand pacemaker care and follow up appointments. Pt verbally understands HF education and understands the importance of medication compliance and daily weights. Pt is not a smoker. Pt is steady on feet. VSS. Pt is voiding without difficulty. All needs met. Pt stable.

## 2017-11-21 NOTE — DISCHARGE INSTRUCTIONS
Discharge Instructions    Discharged to home by car with relative. Discharged via walking, hospital escort: Refused.  Special equipment needed: Not Applicable    Okay back to work on Monday 11/27/17 with limited movement with no lifting arms about shoulders  Be sure to schedule a follow-up appointment with your primary care doctor or any specialists as instructed.     Discharge Plan:   Influenza Vaccine Indication: Not indicated: Previously immunized this influenza season and > 8 years of age    I understand that a diet low in cholesterol, fat, and sodium is recommended for good health. Unless I have been given specific instructions below for another diet, I accept this instruction as my diet prescription.   Other diet:  LOW fat low salt    Special Instructions: None    · Is patient discharged on Warfarin / Coumadin?   No     · Is patient Post Blood Transfusion?  Pacemaker Follow-up   A pacemaker follow-up is done to evaluate the pacemaker and its battery (pulse generator). Regular follow-up visits must be done to assess pacemaker function. These visits are determined by how old the pacemaker is or if there are signs of a low battery. Most pacemakers can also sense if your heart has had any abnormal heart beats (arrhythmias). Different kinds of tests are used to look at pacemaker function, check for abnormal heart beats, and assess battery life. If the battery energy is low, you may need a new battery.  BEFORE THE PROCEDURE   Your caregiver may do a physical exam to:  Examine your neck veins for swelling (engorgement).   Feel the skin over the pacemaker for swelling or tenderness.   Check your skin over the pacemaker for signs of redness or wearing away.   Make certain the pacemaker has not moved from its original position.   LET YOUR CAREGIVER KNOW ABOUT:   Chest pain.   Dizziness.   Fainting (syncope).   Skipped heart beats (palpitations).   Fatigue or low energy.   Any car accidents, falls, or injuries that have  "happened. It is very important for your caregiver to know about any injury that has occurred to your chest or back. Injuries near the pacemaker can affect how it functions.   Any type of \"hiccuping\" or \"jumping\" near your upper stomach (diaphragm). If this happens, the pacemaker settings may need to be changed. This does not mean your pacemaker is malfunctioning.   PROCEDURE  There are different ways your pacemaker can be checked. These include:   COMPREHENSIVE EVALUATION  To perform a comprehensive evaluation, your caregiver may use a computer called a . The  has a special wand that is placed over the pacemaker. The wand communicates with the pacemaker through a radio wave signal. The signal receives information from the pulse generator. By this method, your caregiver can assess pacemaker sensing, function, and battery life. Your caregiver can also make changes to the pacemaker settings. A comprehensive evaluation helps your caregiver fine-tune your pacemaker.  PACEMAKER MAGNET ASSESSMENT  Your caregiver may perform a magnet test of your pacemaker. This test looks at pacemaker function and battery life. A special magnet is placed over your pacemaker. The magnet causes the pacemaker to function at a fixed rate. The magnet provides some basic testing that can be done at home or in your caregiver's office.   TRANS-TELEPHONIC MONITORING  Your caregiver can also monitor your pacemaker at home. This is done over the telephone using a trans-telephonic method. A special device called a trans-telephonic transmitter sends information about your pacemaker to your caregiver's office. The information received allows your caregiver to see if the pacemaker is sensing the heartbeats properly, getting a low battery, or pacing abnormally.   AFTER THE PROCEDURE  You will get a copy of your follow-up visit. The information will have the model number, date, current function and remaining battery life.   Keep your " pacemaker card with you at all times. It is important to know:   Implant date.   Type (NBE code).   Brand.   .   Programmed rate.   Generator model.   If the pacemaker has detected an abnormal heart beat, more testing may be needed.   Document Released: 09/26/2009 Document Revised: 03/11/2013 Document Reviewed: 09/26/2009  ExitCare® Patient Information ©2013 [employer].  Heart Failure  Heart failure is a condition in which the heart has trouble pumping blood. This means your heart does not pump blood efficiently for your body to work well. In some cases of heart failure, fluid may back up into your lungs or you may have swelling (edema) in your lower legs. Heart failure is usually a long-term (chronic) condition. It is important for you to take good care of yourself and follow your health care provider's treatment plan.  CAUSES   Some health conditions can cause heart failure. Those health conditions include:  · High blood pressure (hypertension). Hypertension causes the heart muscle to work harder than normal. When pressure in the blood vessels is high, the heart needs to pump (contract) with more force in order to circulate blood throughout the body. High blood pressure eventually causes the heart to become stiff and weak.  · Coronary artery disease (CAD). CAD is the buildup of cholesterol and fat (plaque) in the arteries of the heart. The blockage in the arteries deprives the heart muscle of oxygen and blood. This can cause chest pain and may lead to a heart attack. High blood pressure can also contribute to CAD.  · Heart attack (myocardial infarction). A heart attack occurs when one or more arteries in the heart become blocked. The loss of oxygen damages the muscle tissue of the heart. When this happens, part of the heart muscle dies. The injured tissue does not contract as well and weakens the heart's ability to pump blood.  · Abnormal heart valves. When the heart valves do not open and close  properly, it can cause heart failure. This makes the heart muscle pump harder to keep the blood flowing.  · Heart muscle disease (cardiomyopathy or myocarditis). Heart muscle disease is damage to the heart muscle from a variety of causes. These can include drug or alcohol abuse, infections, or unknown reasons. These can increase the risk of heart failure.  · Lung disease. Lung disease makes the heart work harder because the lungs do not work properly. This can cause a strain on the heart, leading it to fail.  · Diabetes. Diabetes increases the risk of heart failure. High blood sugar contributes to high fat (lipid) levels in the blood. Diabetes can also cause slow damage to tiny blood vessels that carry important nutrients to the heart muscle. When the heart does not get enough oxygen and food, it can cause the heart to become weak and stiff. This leads to a heart that does not contract efficiently.  · Other conditions can contribute to heart failure. These include abnormal heart rhythms, thyroid problems, and low blood counts (anemia).  Certain unhealthy behaviors can increase the risk of heart failure, including:  · Being overweight.  · Smoking or chewing tobacco.  · Eating foods high in fat and cholesterol.  · Abusing illicit drugs or alcohol.  · Lacking physical activity.  SYMPTOMS   Heart failure symptoms may vary and can be hard to detect. Symptoms may include:  · Shortness of breath with activity, such as climbing stairs.  · Persistent cough.  · Swelling of the feet, ankles, legs, or abdomen.  · Unexplained weight gain.  · Difficulty breathing when lying flat (orthopnea).  · Waking from sleep because of the need to sit up and get more air.  · Rapid heartbeat.  · Fatigue and loss of energy.  · Feeling light-headed, dizzy, or close to fainting.  · Loss of appetite.  · Nausea.  · Increased urination during the night (nocturia).  DIAGNOSIS   A diagnosis of heart failure is based on your history, symptoms, physical  examination, and diagnostic tests. Diagnostic tests for heart failure may include:  · Echocardiography.  · Electrocardiography.  · Chest X-ray.  · Blood tests.  · Exercise stress test.  · Cardiac angiography.  · Radionuclide scans.  TREATMENT   Treatment is aimed at managing the symptoms of heart failure. Medicines, behavioral changes, or surgical intervention may be necessary to treat heart failure.  · Medicines to help treat heart failure may include:  ¨ Angiotensin-converting enzyme (ACE) inhibitors. This type of medicine blocks the effects of a blood protein called angiotensin-converting enzyme. ACE inhibitors relax (dilate) the blood vessels and help lower blood pressure.  ¨ Angiotensin receptor blockers (ARBs). This type of medicine blocks the actions of a blood protein called angiotensin. Angiotensin receptor blockers dilate the blood vessels and help lower blood pressure.  ¨ Water pills (diuretics). Diuretics cause the kidneys to remove salt and water from the blood. The extra fluid is removed through urination. This loss of extra fluid lowers the volume of blood the heart pumps.  ¨ Beta blockers. These prevent the heart from beating too fast and improve heart muscle strength.  ¨ Digitalis. This increases the force of the heartbeat.  · Healthy behavior changes include:  ¨ Obtaining and maintaining a healthy weight.  ¨ Stopping smoking or chewing tobacco.  ¨ Eating heart-healthy foods.  ¨ Limiting or avoiding alcohol.  ¨ Stopping illicit drug use.  ¨ Physical activity as directed by your health care provider.  · Surgical treatment for heart failure may include:  ¨ A procedure to open blocked arteries, repair damaged heart valves, or remove damaged heart muscle tissue.  ¨ A pacemaker to improve heart muscle function and control certain abnormal heart rhythms.  ¨ An internal cardioverter defibrillator to treat certain serious abnormal heart rhythms.  ¨ A left ventricular assist device (LVAD) to assist the  pumping ability of the heart.  HOME CARE INSTRUCTIONS   · Take medicines only as directed by your health care provider. Medicines are important in reducing the workload of your heart, slowing the progression of heart failure, and improving your symptoms.  ¨ Do not stop taking your medicine unless directed by your health care provider.  ¨ Do not skip any dose of medicine.  ¨ Refill your prescriptions before you run out of medicine. Your medicines are needed every day.  · Engage in moderate physical activity if directed by your health care provider. Moderate physical activity can benefit some people. The elderly and people with severe heart failure should consult with a health care provider for physical activity recommendations.  · Eat heart-healthy foods. Food choices should be free of trans fat and low in saturated fat, cholesterol, and salt (sodium). Healthy choices include fresh or frozen fruits and vegetables, fish, lean meats, legumes, fat-free or low-fat dairy products, and whole grain or high fiber foods. Talk to a dietitian to learn more about heart-healthy foods.  · Limit sodium if directed by your health care provider. Sodium restriction may reduce symptoms of heart failure in some people. Talk to a dietitian to learn more about heart-healthy seasonings.  · Use healthy cooking methods. Healthy cooking methods include roasting, grilling, broiling, baking, poaching, steaming, or stir-frying. Talk to a dietitian to learn more about healthy cooking methods.  · Limit fluids if directed by your health care provider. Fluid restriction may reduce symptoms of heart failure in some people.  · Weigh yourself every day. Daily weights are important in the early recognition of excess fluid. You should weigh yourself every morning after you urinate and before you eat breakfast. Wear the same amount of clothing each time you weigh yourself. Record your daily weight. Provide your health care provider with your weight  record.  · Monitor and record your blood pressure if directed by your health care provider.  · Check your pulse if directed by your health care provider.  · Lose weight if directed by your health care provider. Weight loss may reduce symptoms of heart failure in some people.  · Stop smoking or chewing tobacco. Nicotine makes your heart work harder by causing your blood vessels to constrict. Do not use nicotine gum or patches before talking to your health care provider.  · Keep all follow-up visits as directed by your health care provider. This is important.  · Limit alcohol intake to no more than 1 drink per day for nonpregnant women and 2 drinks per day for men. One drink equals 12 ounces of beer, 5 ounces of wine, or 1½ ounces of hard liquor. Drinking more than that is harmful to your heart. Tell your health care provider if you drink alcohol several times a week. Talk with your health care provider about whether alcohol is safe for you. If your heart has already been damaged by alcohol or you have severe heart failure, drinking alcohol should be stopped completely.  · Stop illicit drug use.  · Stay up-to-date with immunizations. It is especially important to prevent respiratory infections through current pneumococcal and influenza immunizations.  · Manage other health conditions such as hypertension, diabetes, thyroid disease, or abnormal heart rhythms as directed by your health care provider.  · Learn to manage stress.  · Plan rest periods when fatigued.  · Learn strategies to manage high temperatures. If the weather is extremely hot:  ¨ Avoid vigorous physical activity.  ¨ Use air conditioning or fans or seek a cooler location.  ¨ Avoid caffeine and alcohol.  ¨ Wear loose-fitting, lightweight, and light-colored clothing.  · Learn strategies to manage cold temperatures. If the weather is extremely cold:  ¨ Avoid vigorous physical activity.  ¨ Layer clothes.  ¨ Wear mittens or gloves, a hat, and a scarf when  going outside.  ¨ Avoid alcohol.  · Obtain ongoing education and support as needed.  · Participate in or seek rehabilitation as needed to maintain or improve independence and quality of life.  SEEK MEDICAL CARE IF:   · You have a rapid weight gain.  · You have increasing shortness of breath that is unusual for you.  · You are unable to participate in your usual physical activities.  · You tire easily.  · You cough more than normal, especially with physical activity.  · You have any or more swelling in areas such as your hands, feet, ankles, or abdomen.  · You are unable to sleep because it is hard to breathe.  · You feel like your heart is beating fast (palpitations).  · You become dizzy or light-headed upon standing up.  SEEK IMMEDIATE MEDICAL CARE IF:   · You have difficulty breathing.  · There is a change in mental status such as decreased alertness or difficulty with concentration.  · You have a pain or discomfort in your chest.  · You have an episode of fainting (syncope).  MAKE SURE YOU:   · Understand these instructions.  · Will watch your condition.  · Will get help right away if you are not doing well or get worse.     This information is not intended to replace advice given to you by your health care provider. Make sure you discuss any questions you have with your health care provider.     Document Released: 12/18/2006 Document Revised: 05/03/2016 Document Reviewed: 01/17/2014  [employer] Patient Education ©2016 [employer]      Depression / Suicide Risk    As you are discharged from this [facility] facility, it is important to learn how to keep safe from harming yourself.    Recognize the warning signs:  · Abrupt changes in personality, positive or negative- including increase in energy   · Giving away possessions  · Change in eating patterns- significant weight changes-  positive or negative  · Change in sleeping patterns- unable to sleep or sleeping all the time   · Unwillingness or  inability to communicate  · Depression  · Unusual sadness, discouragement and loneliness  · Talk of wanting to die  · Neglect of personal appearance   · Rebelliousness- reckless behavior  · Withdrawal from people/activities they love  · Confusion- inability to concentrate     If you or a loved one observes any of these behaviors or has concerns about self-harm, here's what you can do:  · Talk about it- your feelings and reasons for harming yourself  · Remove any means that you might use to hurt yourself (examples: pills, rope, extension cords, firearm)  · Get professional help from the community (Mental Health, Substance Abuse, psychological counseling)  · Do not be alone:Call your Safe Contact- someone whom you trust who will be there for you.  · Call your local CRISIS HOTLINE 777-6072 or 205-229-8538  · Call your local Children's Mobile Crisis Response Team [place] (246) 444-9218 or www.[employer]  · Call the toll free National Suicide Prevention Hotlines   · National Suicide Prevention Lifeline 932-013-UABI (9079)  · National Hope Line Network 800-SUICIDE (251-0191)

## 2017-11-21 NOTE — PROGRESS NOTES
RN noted patient to be 100% paced rates in the 80s with PVCs.Per cath lab report pacemaker intrinsic rate , to shock Vtach at 170. Day shift RN to request AICD interrogation.

## 2017-11-21 NOTE — PROGRESS NOTES
Patient returned to floor from cath lab at 1845. Report received over phone and at bedside from cath lab DARIUSZ Cline; night shift CIC RN Florence present for bedside report.    Sinus rhythm (rate 60's-80's) with frequent multifocal PVCs.    12 hour chart check complete.

## 2017-11-21 NOTE — THERAPY
"Physical Therapy Evaluation completed.   Gait: Level Of Assist: Independent with No Equipment Needed       Plan of Care: Patient with no further skilled PT needs in the acute care setting at this time  Discharge Recommendations: Equipment: No Equipment Needed. See below    After initial evaluation and pt and spouse education pt has no further skilled acute PT needs. He is planning to d/c later today to home. He has no concerns with functional ability to enter/exit and navigate home environment. He was given education re: signs/symptoms of HF and relation to exertional activity, use of RPE/talk test for exertional activity for progression, and recs for activity and monitoring hemodynamics with re: symptoms. Anticiapte no immediate needs after medical d/c to home though pt may benefit from outpatient cardiac rehab at later date.     See \"Rehab Therapy-Acute\" Patient Summary Report for complete documentation.     "

## 2017-11-21 NOTE — DISCHARGE PLANNING
Care Transition Team Assessment    Information Source  Orientation : Oriented x 4  Information Given By: Patient  Informant's Name: Richard  Who is responsible for making decisions for patient? : Patient    Readmission Evaluation  Is this a readmission?: No    Elopement Risk  Legal Hold: No  Ambulatory or Self Mobile in Wheelchair: Yes  Disoriented: No  Psychiatric Symptoms: None  History of Wandering: No  Elopement this Admit: No  Vocalizing Wanting to Leave: No  Displays Behaviors, Body Language Wanting to Leave: No-Not at Risk for Elopement  Elopement Risk: Not at Risk for Elopement    Interdisciplinary Discharge Planning  Does Admitting Nurse Feel This Could be a Complex Discharge?: No  Primary Care Physician: Dr. Jaramillo  Lives with - Patient's Self Care Capacity: Spouse, Child Less than 18 Years of Age  Patient or legal guardian wants to designate a caregiver (see row info): No  Support Systems: Family Member(s), Spouse / Significant Other  Housing / Facility: 1 Story Apartment / Condo  Do You Take your Prescribed Medications Regularly: Yes  Mobility Issues: No  Prior Services: None  Patient Expects to be Discharged to:: home  Assistance Needed: No  Durable Medical Equipment: Not Applicable    Discharge Preparedness  What is your plan after discharge?: Home with help  What are your discharge supports?: Spouse  Prior Functional Level: Independent with Activities of Daily Living  Difficulity with ADLs: None  Difficulity with IADLs: None    Functional Assesment  Prior Functional Level: Independent with Activities of Daily Living    Finances  Financial Barriers to Discharge: No  Prescription Coverage: Yes    Vision / Hearing Impairment  Vision Impairment : Yes (glasses)  Hearing Impairment : No    Values / Beliefs / Concerns  Values / Beliefs Concerns : No  Special Hospitalization Concerns: n/a    Advance Directive  Advance Directive?: None    Domestic Abuse  Have you ever been the victim of abuse or violence?:  No  Physical Abuse or Sexual Abuse: No  Verbal Abuse or Emotional Abuse: No    Psychological Assessment  History of Substance Abuse: None  History of Psychiatric Problems: No    Discharge Risks or Barriers  Discharge risks or barriers?: No    Anticipated Discharge Information  Anticipated discharge disposition: Home  Discharge Address:  ([address]

## 2017-11-21 NOTE — PROGRESS NOTES
Cardiology Progress Note               Author: Angelica Jauregui Date & Time created: 11/21/2017  8:37 AM     Interval History:  Patient seen on EPS rounds.  HISTORY OF PRESENT ILLNESS:  Patient is a 47-year-old gentleman seen urgently   in the ER at the request of Dr. Sanchez for evaluation of VT.  The patient has   a history of a dilated cardiomyopathy, which was first diagnosed in 2005.    According to records, at that time, his EF was 15% and was felt that his LV   dysfunction may have been secondary to untreated hypertension.  In 2007, his   EF had increased to 45% with medical therapy.  The patient has a history of   sleep apnea that was treated with palato-uvulopharyngoplasty.  He currently   does not wear a BiPAP mask.  The patient has been noncompliant with followup   and was seen in our office by Dr. Marr in 2013 and most recently by Dr. Patrick in March 2016.  Patient seen by Dr Wallace in consultation and BiV ICD BSI implanted yesterday.  Interrogation intact with P waves 6.4mV, 773 ohms, and threshold 0.7V  R waves 25mv, 487 ohms and 0.7V  LV 0.6mV, 677 ohms, HV 77 ohms.  CXR no late Ptx noted.     Review of Systems   Constitutional: Negative for chills, fever, malaise/fatigue and weight loss.   HENT: Negative for congestion and sore throat.    Eyes: Negative for blurred vision and double vision.   Respiratory: Negative for cough, sputum production, shortness of breath and wheezing.    Cardiovascular: Positive for chest pain (Incisional at device implant site.). Negative for palpitations, orthopnea, claudication, leg swelling and PND.   Gastrointestinal: Negative for abdominal pain, heartburn, nausea and vomiting.   Genitourinary: Negative for dysuria, flank pain and frequency.   Musculoskeletal: Negative.    Skin: Negative.    Neurological: Negative for dizziness, speech change, focal weakness, seizures, loss of consciousness and headaches.   Endo/Heme/Allergies: Negative.    Psychiatric/Behavioral:  Negative.        Physical Exam   Constitutional: He is oriented to person, place, and time. He appears well-developed and well-nourished.   HENT:   Head: Normocephalic and atraumatic.   Eyes: Pupils are equal, round, and reactive to light.   Neck: Normal range of motion. Neck supple. No thyromegaly present.   Cardiovascular: Normal rate and regular rhythm.  Exam reveals no gallop and no friction rub.    No murmur heard.  Pulmonary/Chest: Effort normal and breath sounds normal. No respiratory distress. He has no wheezes. He has no rales.   Device site CDI.   Abdominal: Soft. Bowel sounds are normal. He exhibits no distension. There is no tenderness. There is no guarding.   Musculoskeletal: Normal range of motion. He exhibits no edema.   Neurological: He is alert and oriented to person, place, and time.   Skin: Skin is warm and dry.   Psychiatric: He has a normal mood and affect.       Hemodynamics:  Temp (24hrs), Av.3 °C (97.3 °F), Min:36.1 °C (96.9 °F), Max:36.5 °C (97.7 °F)  Temperature: 36.1 °C (96.9 °F)  Pulse  Av.9  Min: 27  Max: 189Heart Rate (Monitored): 73  NIBP: 124/70     Respiratory:    Respiration: 14, Pulse Oximetry: 94 %        RUL Breath Sounds: Clear, RML Breath Sounds: Clear, RLL Breath Sounds: Clear, DILLAN Breath Sounds: Clear, LLL Breath Sounds: Clear  Fluids:        GI/Nutrition:  Orders Placed This Encounter   Procedures   • Diet Order     Standing Status:   Standing     Number of Occurrences:   1     Order Specific Question:   Diet:     Answer:   Cardiac [6]     Lab Results:  Recent Labs      17   0323   WBC  9.7   RBC  4.26*   HEMOGLOBIN  13.1*   HEMATOCRIT  38.9*   MCV  91.3   MCH  30.8   MCHC  33.7   RDW  42.9   PLATELETCT  120*   MPV  10.5     Recent Labs      17   SODIUM  137   POTASSIUM  3.7   CHLORIDE  110   CO2  18*   GLUCOSE  275*   BUN  18   CREATININE  0.79   CALCIUM  8.1*                         Medical Decision Making, by Problem:  Active Hospital Problems     Diagnosis   • *V-tach (CMS-[facility]) [I47.2]   • Chronic combined systolic and diastolic heart failure (CMS-[facility]) [I50.42]   • DM (diabetes mellitus) (CMS-[facility]) [E11.9]   • Hypokalemia [E87.6]   • HTN (hypertension) [I10]   • Sleep apnea [G47.30]       Plan:  Continue with present medical regimen.  Avoid Amiodarone with high DFT's on implantation of device.  From EPS perspective may be discharged home will arrange follow up in one week in our office.        Reviewed items::  EKG reviewed, Labs reviewed, Medications reviewed and Radiology images reviewed

## 2017-11-21 NOTE — OR SURGEON
Immediate Post-Operative Note      PreOp Diagnosis: vt; cardiomyopathy; ivcd    PostOp Diagnosis: same    Procedure(s) :  biv icd with dft testing.  Cardioversion during case from vt to nsr    Surgeon(s):  Fazal Wallace M.D.    Type of Anesthesia: Moderate Sedation    Specimen: None    Estimated Blood Loss: 20 cc's    Contrast Media:  20 cc's    Fluoro Time: see scanned report    Findings: lateral lead placed.   Good parameters on all 3 leads on boston device  Axillary access- venogram guided  Reverse polarity failed to rescue 31 succss 41  Baseline polarity did not induce at 31        Complications: none apparent  With high dft will stop amio      Fazal Wallace M.D.  11/20/2017 6:43 PM

## 2017-11-21 NOTE — PROGRESS NOTES
[facility]    Date of Service: 2017    Chief Complaint  47 y.o. male admitted 2017 with chest pain    Interval Problem Update  Patient with chest pain and weakness at home  EMS found him in perfusing ventricular tachycardia  Received amiodarone and was cardioverted in the emergency room  Cardiac cath 2017 with nonobstructive disease    He is feeling OK, wants to go home  No chest pain, no palpitations, no recurrence of VT  Blood sugars poorly controlled. No polyuira    Consultants/Specialty  Cardiology, electrophysiology    Disposition  Can likely be discharged home after AICD placed    Patient discussed on rounds with intensivist, RN, respiratory therapy, and pharmacy.      Review of Systems   Constitutional: Negative for chills, diaphoresis and malaise/fatigue.   Respiratory: Negative for cough and hemoptysis.    Cardiovascular: Negative for chest pain and palpitations.   Gastrointestinal: Negative for abdominal pain, nausea and vomiting.   Musculoskeletal: Negative for back pain and neck pain.   Skin: Negative for itching and rash.      Physical Exam  Laboratory/Imaging   Hemodynamics  Temp (24hrs), Av.6 °C (97.8 °F), Min:36 °C (96.8 °F), Max:37.1 °C (98.8 °F)   Temperature: 36.5 °C (97.7 °F)  Pulse  Av.9  Min: 27  Max: 189 Heart Rate (Monitored): 90  NIBP: 142/82      Respiratory      Respiration: 19, Pulse Oximetry: 96 %        RUL Breath Sounds: Clear, RML Breath Sounds: Clear, RLL Breath Sounds: Clear, DILLAN Breath Sounds: Clear, LLL Breath Sounds: Clear    Fluids    Intake/Output Summary (Last 24 hours) at 17 2200  Last data filed at 17   Gross per 24 hour   Intake          1582.03 ml   Output             2500 ml   Net          -917.97 ml       Nutrition  Orders Placed This Encounter   Procedures   • Diet Order     Standing Status:   Standing     Number of Occurrences:   1     Order Specific Question:   Diet:     Answer:   Cardiac [6]      Physical Exam   Constitutional: He is oriented to person, place, and time. He appears well-developed and well-nourished. No distress.   HENT:   Head: Normocephalic and atraumatic.   Right Ear: External ear normal.   Left Ear: External ear normal.   Eyes: Conjunctivae are normal. No scleral icterus.   Cardiovascular: Normal rate, regular rhythm, normal heart sounds and intact distal pulses.    No murmur heard.  Pulmonary/Chest: Effort normal and breath sounds normal. No respiratory distress. He has no wheezes.   Abdominal: Soft. Bowel sounds are normal. He exhibits no distension. There is no tenderness. There is no rebound and no guarding.   Musculoskeletal: Normal range of motion. He exhibits no edema.   Neurological: He is alert and oriented to person, place, and time. No cranial nerve deficit.   Skin: Skin is warm and dry. No erythema.   Psychiatric: He has a normal mood and affect.       Recent Labs      11/18/17   0441  11/19/17   0323   WBC  11.5*  9.7   RBC  4.74  4.26*   HEMOGLOBIN  14.3  13.1*   HEMATOCRIT  42.1  38.9*   MCV  88.8  91.3   MCH  30.2  30.8   MCHC  34.0  33.7   RDW  42.1  42.9   PLATELETCT  180  120*   MPV  11.0  10.5     Recent Labs      11/18/17   0441  11/19/17   0323   SODIUM  134*  137   POTASSIUM  3.9  3.7   CHLORIDE  107  110   CO2  22  18*   GLUCOSE  275*  275*   BUN  19  18   CREATININE  0.85  0.79   CALCIUM  8.8  8.1*                      Assessment/Plan     * V-tach (CMS-[facility])- (present on admission)   Assessment & Plan    Patient received amiodarone and required cardioversion in the emergency room  No recurrence of VT since he has been on amiodarone drip  Cardiac cath shows only nonobstructive coronary artery disease disease  Maintain electrolytes  Amiodarone drip  AICD planned today        DM (diabetes mellitus) (CMS-HCC)- (present on admission)   Assessment & Plan    Blood sugars are uncontrolled  Increase lantus, most intense sliding scale        Chronic combined systolic and  diastolic heart failure (CMS-HCC)- (present on admission)   Assessment & Plan    No acute exacerbation of heart failure  Continue Coreg, continue lisinopril        Hypokalemia- (present on admission)   Assessment & Plan    Replace potassium, replace magnesium        HTN (hypertension)- (present on admission)   Assessment & Plan    Coreg and lisinopril        Sleep apnea- (present on admission)   Assessment & Plan    s/p UVP            Reviewed items::  Labs reviewed and Medications reviewed  Mccall catheter::  No Mccall

## 2017-11-21 NOTE — CARE PLAN
Problem: Knowledge Deficit  Goal: Knowledge of disease process/condition, treatment plan, diagnostic tests, and medications will improve  RN gave patient [employer] AICD info booklet answered question about new device, educated about sling and restricted left arm movement.    Problem: Discharge Barriers/Planning  Goal: Patient's continuum of care needs will be met  RN encouraged mobilization post AICD to promote continued mobility and reduce recovery time.

## 2017-11-21 NOTE — PROGRESS NOTES
Cardiology Follow-up Consult Note    Date of note:    11/21/2017      Consulting Physician: Walt Pollard M.D.    Patient ID:    Name:   Richard Franklin   YOB: 1970  Age:   47 y.o.  male   MRN:   [id number]      Reason for Consultation: Ventricular tachycardia    HPI/Patient ID:    Patient is a 47-year-old gentleman seen urgently in the ER at the request of Dr. Sanchez for evaluation of VT.  The patient has a history of a dilated cardiomyopathy, which was first diagnosed in 2005. According to records, at that time, his EF was 15% and was felt that his LV dysfunction may have been secondary to untreated hypertension.  In 2007, his EF had increased to 45% with medical therapy.  The patient has a history of sleep apnea that was treated with palato-uvulopharyngoplasty.  He currently does not wear a BiPAP mask.  The patient has been noncompliant with followup and was seen in our office by Dr. Marr in 2013 and most recently by Dr. Patrick in March 2016.    On 11/17/17 he experienced an episode of Syncope and EMS found him in perfusing ventricular tachycardia which responded to cardioversion x 1.      Interim Events:  -Asymptomatic overnight without chest pain, palpitations, lightheadedness.   -Frequent ectopy on telemetry monitoring.   -cardiac MRI yesterday, showed possible amyloid, waiting for further study review.  -ICD yesterday, functioning well.  -for DC today.           Review of Systems   Gastrointestinal: Negative for hematemesis, hematochezia and melena.   Genitourinary: Negative for hematuria.   Neurological: Negative for focal weakness.        All others reviewed and negative.      Current Facility-Administered Medications   Medication Dose Route Frequency Provider Last Rate Last Dose   • insulin glargine (LANTUS) injection 25 Units  25 Units Subcutaneous BID Walt Pollard M.D.   25 Units at 11/20/17 1900   • spironolactone (ALDACTONE) tablet 25 mg  25 mg Oral Q DAY Frantz Franklin M.D.        • Pharmacy Consult Request ...Pain Management Review 1 Each  1 Each Other PRN Fazal Wallace M.D.       • doxycycline monohydrate (ADOXA) tablet 100 mg  100 mg Oral Q12HRS Fazal Wallace M.D.   100 mg at 11/20/17 2026   • ondansetron (ZOFRAN) syringe/vial injection 4 mg  4 mg Intravenous Q4HRS PRN Dario Joyner M.D.       • carvedilol (COREG) tablet 25 mg  25 mg Oral BID Sara Watson M.D.   25 mg at 11/20/17 2026   • lisinopril (PRINIVIL) 10 MG tablet 5 mg  5 mg Oral DAILY Sara Watson M.D.   5 mg at 11/20/17 0815   • senna-docusate (PERICOLACE or SENOKOT S) 8.6-50 MG per tablet 2 Tab  2 Tab Oral BID Sara Watson M.D.   Stopped at 11/17/17 2100    And   • polyethylene glycol/lytes (MIRALAX) PACKET 1 Packet  1 Packet Oral QDAY PRN Sara Watson M.D.        And   • magnesium hydroxide (MILK OF MAGNESIA) suspension 30 mL  30 mL Oral QDAY PRN Sara Watson M.D.        And   • bisacodyl (DULCOLAX) suppository 10 mg  10 mg Rectal QDAY PRN Sara Watson M.D.       • labetalol (NORMODYNE,TRANDATE) injection 10 mg  10 mg Intravenous Q4HRS PRN Sara Watson M.D.       • promethazine (PHENERGAN) tablet 12.5-25 mg  12.5-25 mg Oral Q4HRS PRN aSra Watson M.D.       • promethazine (PHENERGAN) suppository 12.5-25 mg  12.5-25 mg Rectal Q4HRS PRN Sara Watson M.D.       • prochlorperazine (COMPAZINE) injection 5-10 mg  5-10 mg Intravenous Q4HRS PRN Sara Watson M.D.       • ondansetron (ZOFRAN ODT) dispertab 4 mg  4 mg Oral Q4HRS PRN Sara Watson M.D.       • lorazepam (ATIVAN) tablet 0.5 mg  0.5 mg Oral Q6HRS PRN Sara Watson M.D.        Or   • lorazepam (ATIVAN) injection 0.5 mg  0.5 mg Intravenous Q6HRS PRN Sara Watson M.D.       • acetaminophen (TYLENOL) tablet 650 mg  650 mg Oral Q6HRS PRN Sara Watson M.D.       • insulin regular (HUMULIN R) injection 3-14 Units  3-14 Units Subcutaneous Q6HRS Sara Watson M.D.   4 Units at 11/21/17 0633    And   • glucose 4 g  "chewable tablet 16 g  16 g Oral Q15 MIN PRN Sara Watson M.D.        And   • dextrose 50% (D50W) injection 25 mL  25 mL Intravenous Q15 MIN PRN Sara Watson M.D.             No Known Allergies        Physical Exam  Body mass index is 30.75 kg/m².  Blood pressure 100/76, pulse 86, temperature 36.1 °C (96.9 °F), resp. rate 14, height 1.93 m (6' 4\"), weight 114.6 kg (252 lb 10.4 oz), SpO2 94 %.  Vitals:    11/21/17 0400 11/21/17 0500 11/21/17 0600 11/21/17 0700   BP:       Pulse:       Resp: 18 18 14    Temp:       SpO2: 96% 93% 95% 94%   Weight:       Height:         Oxygen Therapy:  Pulse Oximetry: 94 %, O2 Delivery: None (Room Air)    General: Well appearing and in no apparent distress  Eyes: nl conjunctiva  ENT: OP clear  Neck: JVP 5-6 cm H2O, no carotid bruits  Lungs: normal respiratory effort, CTAB  Heart: irregular rhythm, no murmurs, no rubs or gallops, no edema bilateral lower extremities. No LV/RV heave on cardiac palpatation. 2+ bilateral radial pulses.  2+ bilateral dp pulses. ICD dressing c/d/i in left chest wall.   Abdomen: soft, non tender, non distended, no masses, normal bowel sounds.  No HSM.  Extremities/MSK: no clubbing, no cyanosis  Neurological: No focal sensory deficits  Psychiatric: Appropriate affect, A/O x 3  Skin: Warm extremities      Labs (personally reviewed and notable for):   BNP 11/17 - 114  Trop neg   Creatinine 0.8          Cardiac Imaging and Procedures Review:    EKG dated 11/19/17:  SINUS RHYTHM  MULTIPLE VENTRICULAR PREMATURE COMPLEXES  FIRST DEGREE AV BLOCK  LEFT BUNDLE BRANCH BLOCK  BASELINE WANDER IN LEAD(S) V2      Echo dated 11/18/17:   Left Ventricle  Left ventricle is moderately dilated. Moderate concentric left   ventricular hypertrophy. Moderately reduced left ventricular systolic   function. Left ventricular ejection fraction is visually estimated to   be 30 to 35%. Global hypokinesis with regional variation.    Echo dated 11/17/17:  LV EF:  30   "  %    FINDINGS  Left Ventricle  Left ventricle is mildly dilated. Mild concentric left ventricular   hypertrophy. Severely reduced left ventricular systolic function. Left   ventricular ejection fraction is visually estimated to be 30%. Global   hypokinesis.    Right Ventricle  Normal right ventricular size and systolic function.    Right Atrium  Normal right atrial size.    Left Atrium  Mildly dilated left atrium. Left atrial volume index is 36 mL/sq m.    Mitral Valve  Structurally normal mitral valve without significant stenosis or   regurgitation.    Aortic Valve  Structurally normal aortic valve without significant stenosis or   regurgitation.    Tricuspid Valve  Structurally normal tricuspid valve without significant stenosis or   regurgitation.    Pulmonic Valve  The pulmonic valve is not well visualized.    Pericardium  No pericardial effusion seen.    Aorta  Normal ascending aorta.      [facility] (11/18/17):   PreOp Diagnosis:   1. VT  2. OHCA  3. NICM, LVEF 30%     PostOp Diagnosis:   1. Nonobstructive CAD  2. 60% mLAD, iFR and FFR normal    Radiology test Review:  CXR (today):   Left-sided pacemaker in place.    No pulmonary infiltrates or consolidations are noted.  No pleural effusion. No pneumothorax.    Stable cardiopericardial silhouette.      ICD placement:  Findings: lateral lead placed.   Good parameters on all 3 leads on boston device  Axillary access- venogram guided  Reverse polarity failed to rescue 31 succss 41  Baseline polarity did not induce at 31           Complications: none apparent  With high dft will stop amio        Impression and Medical Decision Making:  Principal Problem:    V-tach (CMS-[facility]) POA: Yes  Active Problems:    Chronic combined systolic and diastolic heart failure (CMS-[facility]) (Chronic) POA: Yes    DM (diabetes mellitus) (CMS-[facility]) (Chronic) POA: Yes    HTN (hypertension) (Chronic) POA: Yes    Hypokalemia POA: Yes    Sleep apnea (Chronic) POA: Yes  Resolved Problems:    * No  resolved hospital problems. *        Recommendations:  # f/u cardiac MRI final read as outpatient to evaluate for cardiac sarcoid or other infiltrative disease as cause for CT and non-ischemic cardiomyopathy  # s/p biventricular ICD for secondary prevention of VT in setting of non-ischemic cardiomyopathy.   # continue coreg/lisinopril  # Chest CT to evaluate lung parenchyma if cardiac MRI indicative of sarcoid.  To be done as outpatient.   # stop amiodarone as the patient was noted to have high DFTs during ICD placement yesterday per Dr. Wallace.  Amiodarone could make his device less effective, and thus if he has recurrent VT will consider sotalol or dofetilide.  # discharge today.  F/u with [facility]  # spironolactone 25mg PO Daily, labs in one week.     Discussed recommendations with Dr. Barger and Dr. Obrien.   Thank you for allowing me to participate in the care of this patient, I will continue to follow.  Please contact me with any questions.      Frantz Franklin MD  Cardiologist, [facility] [place]   [phone]

## 2017-11-22 ENCOUNTER — PATIENT OUTREACH (OUTPATIENT)
Dept: HEALTH INFORMATION MANAGEMENT | Facility: OTHER | Age: 47
End: 2017-11-22

## 2017-11-22 NOTE — DISCHARGE SUMMARY
DATE OF ADMISSION:  11/17/2017    DATE OF DISCHARGE:  11/21/2017    DISCHARGE DIAGNOSES:  1.  Ventricular tachycardia, status post automatic implantable   cardioverter-defibrillator.  2.  Chronic combined systolic and diastolic heart failure without acute   exacerbation.  3.  Diabetes mellitus.  4.  Hypokalemia.  5.  Hypertension.  6.  Sleep apnea.    CONSULTATIONS:  Dr. Cornejo, cardiology was consulted.    PROCEDURES:  He had an AICD placement by Dr. Fazal Wallace on 11/20/2017.    IMAGING STUDIES:  Echocardiogram revealed an ejection fraction of 30%-35%.  A   cardiac MRI showed heterogeneous appearance to the myocardium, most likely   artifactual.  No discrete focal enhancement is appreciated suggesting   infiltrative process.    LABORATORY DATA:  His creatinine is 0.79.  His TSH is 1.5.    HOSPITAL COURSE:  On 11/17/2017, this 47-year-old male with a known history of   cardiomyopathy, first diagnosed in 2005, which at that point in time, had an   ejection fraction of 15%, presented with chest pain and was found to have a   ventricular tachycardia, was given amiodarone by paramedics.  He required   cardioversion, was admitted to cardiac intensive care unit and was evaluated   by electrophysiology, and an AICD was placed by Dr. Wallace yesterday.  I spoke   with Dr. Wallace today.  He recommends no further amiodarone.  We will follow   him up in the office in about a week.  Given his cardiomyopathy, Dr. Franklin,   cardiology, has recommended adding Aldactone to his lisinopril and Coreg.  It   should be noted that his last potassium is low at 3.7 before the Aldactone was   started.    FOLLOWUP:  He is to follow up in the cardiology office.  Follow with Dr. Mares, his primary care provider.    DISCHARGE MEDICATIONS:  1.  Coreg 25 mg twice a day.  2.  NovoLog insulin, he takes 30 units in the morning and 50 in the afternoon   and 30 units in the evening.  3.  Janumet extended release 50/1000 two tablets daily.  4.   Lisinopril 5 mg daily.  5.  Vascepa 2 g twice a day.  6.  Doxycycline 100 mg twice a day for 4 more days.  7.  Aldactone 25 mg daily.    Thirty five minutes were spent in discharge arrangements and on discussion   with patient, Dr. Franklin, and Dr. Wallace.       ____________________________________     MD LEXX HELMS / MELISSA    DD:  11/21/2017 14:59:45  DT:  11/22/2017 05:42:27    D#:  [id number]  Job#:  [id number]

## 2017-11-22 NOTE — PROCEDURES
REFERRING PHYSICIAN: Dr. Smith Cornejo    PREOPERATIVE DIAGNOSIS:  1. VT  2. OHCA  3. NICM, LVEF 30%    POSTOPERATIVE DIAGNOSIS:  1. Nonobstructive CAD  2. 60% mLAD, iFR and FFR normal      PROCEDURE PERFORMED:  Selective coronary angiography of the native vessels  Left heart catheterization  Left ventriculogram  FFR LAD. iFR LAD.    DESCRIPTION OF PROCEDURE:  The risks and benefits of cardiac catheterization as well as the procedure itself, rationale and appropriateness were discussed with the patient today. Complications including but not limited to death, stroke, MI, urgent bypass surgery, contrast nephropathy, vascular complications, bleeding and infection were explained to the patient. The potential outcomes associated with the procedure (possible PCI, possible CABG, possible medical Rx only) were also discussed at length. The patient agreed to proceed.    The patient was transported to the catheterization laboratory in the fasting state. The right radial area and right groin were prepped and draped in the usual fashion. Right radial area was entered with a single through and through puncture and a 6F glide sheath was placed. An intra-arterial cocktail of heparin, verapamil and nitroglycerine was administered. Over a wire, a 5F Pushpa catheter was passed to the aortic root and used to engage the right and left coronaries without difficulty. Contrast was administered and multiple images obtained. It was then exchanged for a JL 3.5 seated and left main. This catheter was then exchanged for a 6 English pigtail catheter used to cross the aortic valve for LHC no left ventricular gram was completed. All catheters and guidewires were removed and a TR band was applied to achieve patent hemostasis. Patient left the cath lab in stable condition.    INSTANTANEOUS WAVE-FREE RATIO (iFR):  The diagnostic catheter was exchanged for an appropriate guide catheter seated appropriately. The pressure wire was zeroed in the hoop,  advanced just distal to the tip of guide. After meticulous clearing of the catheters and flushing it was normalized. Subsequently he was navigated across the area of interest in the mid LAD and intracoronary nitroglycerin was administered. The iFR was subsequently recorded and found to be nonsignificant and FFR was completed as below to confirm.    FRACTIONAL FLOW RESERVE (LAD)  Contrast was cleared from the catheter, intracoronary nitroglycerin was administered followed by intravenous adenosine at 140 mcg/kg/m for 2 minutes to achieve maximum hyperemia. The FFR of this artery was 0.86.      FINDINGS:  I. HEMODYNAMICS:    Ao: 111/82 mmHg   LEDP: 21 mmHg   Gradient on LV pullback: No    II. CORONARY ANGIOGRAPHY:  Left Main: Large trifurcating no CAD  Left Anterior Descending: Large with several small diagonals. There is a focal 60% stenosis in its midportion. FFR is nonsignificant. Intervention was deferred.  Ramus: Moderate to large-sized 20% proximal stenosis  Left Circumflex: Large continue is a large posterolateral with mild nonobstructive CAD.  Right Coronary: Moderate-sized codominant 30% proximal stenosis.    COMPLICATIONS: none apparent    CONCLUSIONS:  1. VT  2. OHCA  3. NICM, LVEF 30%

## 2017-11-29 ENCOUNTER — NON-PROVIDER VISIT (OUTPATIENT)
Dept: CARDIOLOGY | Facility: MEDICAL CENTER | Age: 47
End: 2017-11-29
Payer: COMMERCIAL

## 2017-11-29 ENCOUNTER — OFFICE VISIT (OUTPATIENT)
Dept: CARDIOLOGY | Facility: MEDICAL CENTER | Age: 47
End: 2017-11-29
Payer: COMMERCIAL

## 2017-11-29 VITALS
DIASTOLIC BLOOD PRESSURE: 74 MMHG | HEIGHT: 76 IN | BODY MASS INDEX: 31.78 KG/M2 | OXYGEN SATURATION: 96 % | SYSTOLIC BLOOD PRESSURE: 110 MMHG | HEART RATE: 72 BPM | WEIGHT: 261 LBS

## 2017-11-29 DIAGNOSIS — I50.22 CHRONIC SYSTOLIC CONGESTIVE HEART FAILURE (HCC): ICD-10-CM

## 2017-11-29 DIAGNOSIS — E78.5 HYPERLIPOPROTEINEMIA: Chronic | ICD-10-CM

## 2017-11-29 DIAGNOSIS — I44.7 LBBB (LEFT BUNDLE BRANCH BLOCK): Chronic | ICD-10-CM

## 2017-11-29 DIAGNOSIS — I10 ESSENTIAL HYPERTENSION: Chronic | ICD-10-CM

## 2017-11-29 DIAGNOSIS — E11.65 TYPE 2 DIABETES MELLITUS WITH HYPERGLYCEMIA, WITH LONG-TERM CURRENT USE OF INSULIN (HCC): Chronic | ICD-10-CM

## 2017-11-29 DIAGNOSIS — I50.42 CHRONIC COMBINED SYSTOLIC AND DIASTOLIC HEART FAILURE (HCC): Chronic | ICD-10-CM

## 2017-11-29 DIAGNOSIS — G47.30 SLEEP APNEA, UNSPECIFIED TYPE: Chronic | ICD-10-CM

## 2017-11-29 DIAGNOSIS — I47.20 V-TACH (HCC): ICD-10-CM

## 2017-11-29 DIAGNOSIS — Z95.810 PRESENCE OF BIVENTRICULAR AUTOMATIC CARDIOVERTER/DEFIBRILLATOR (AICD): ICD-10-CM

## 2017-11-29 DIAGNOSIS — Z79.4 TYPE 2 DIABETES MELLITUS WITH HYPERGLYCEMIA, WITH LONG-TERM CURRENT USE OF INSULIN (HCC): Chronic | ICD-10-CM

## 2017-11-29 PROBLEM — E87.6 HYPOKALEMIA: Status: RESOLVED | Noted: 2017-11-17 | Resolved: 2017-11-29

## 2017-11-29 PROCEDURE — 99214 OFFICE O/P EST MOD 30 MIN: CPT | Performed by: NURSE PRACTITIONER

## 2017-11-29 PROCEDURE — 93284 PRGRMG EVAL IMPLANTABLE DFB: CPT | Performed by: INTERNAL MEDICINE

## 2017-11-29 RX ORDER — CARVEDILOL 25 MG/1
25 TABLET ORAL 2 TIMES DAILY
Qty: 60 TAB | Refills: 11 | Status: SHIPPED | OUTPATIENT
Start: 2017-11-29 | End: 2018-04-23 | Stop reason: SDUPTHER

## 2017-11-29 RX ORDER — SPIRONOLACTONE 25 MG/1
25 TABLET ORAL DAILY
Qty: 30 TAB | Refills: 11 | Status: SHIPPED | OUTPATIENT
Start: 2017-11-29 | End: 2018-04-23 | Stop reason: SDUPTHER

## 2017-11-29 RX ORDER — LISINOPRIL 5 MG/1
5 TABLET ORAL DAILY
Qty: 30 TAB | Refills: 11 | Status: SHIPPED | OUTPATIENT
Start: 2017-11-29 | End: 2018-01-11

## 2017-11-29 ASSESSMENT — ENCOUNTER SYMPTOMS
FEVER: 0
ORTHOPNEA: 0
SHORTNESS OF BREATH: 0
COUGH: 0
PND: 0
MYALGIAS: 0
CLAUDICATION: 0
DIZZINESS: 0
PALPITATIONS: 0
ABDOMINAL PAIN: 0

## 2017-11-29 NOTE — LETTER
[facility]   [address], [facility], [place] [address]  [place] [address]  Phone: [phone]  Fax: [phone]              Richard Franklin  1970    Encounter Date: 11/29/2017    FLORENCE Vincent          PROGRESS NOTE:  Subjective:   Richard Franklin is a 47 y.o. male who presents today for hospital follow up S/P AICD placement.    He is a patient of Dr. Patrick in our office. Hx of rEF of 30% with dilated cardiomyopathy of unknown etiology (possible sarcoidosis), paroxysmal VT, EFFIE, HTN, HLD, and IDDM.    He is overall doing good. His AICD was checked and no events were noted. He has not felt anything and his site is healing well.    He is back to work. He is taking his medications.    He has had no episodes of chest pain, palpitations, dizziness/lightheadedness, shortness of breath, orthopnea, or peripheral edema.    Past Medical History:   Diagnosis Date   • Chronic combined systolic and diastolic heart failure (CMS-HCC)    • Dilated cardiomyopathy (CMS-HCC)     rEFof 30-35%   • DM (diabetes mellitus) (CMS-HCC)    • History of obesity    • Hyperlipoproteinemia    • Hypertension    • Hypertriglyceridemia    • Mixed hyperlipidemia    • Palpitations    • PVC (premature ventricular contraction)    • Sleep apnea    • Ventricular tachycardia (CMS-HCC)      Past Surgical History:   Procedure Laterality Date   • AICD IMPLANT     • CARDIAC CATH     • OTHER      THROAT SURGERY: Uvulopalatopharyngoplasty.     Family History   Problem Relation Age of Onset   • Hypertension Mother      History   Smoking Status   • Never Smoker   Smokeless Tobacco   • Former User     No Known Allergies  Outpatient Encounter Prescriptions as of 11/29/2017   Medication Sig Dispense Refill   • spironolactone (ALDACTONE) 25 MG Tab Take 1 Tab by mouth every day. 30 Tab 11   • carvedilol (COREG) 25 MG Tab Take 1 Tab by mouth 2 Times a Day. 60 Tab 11   • lisinopril (PRINIVIL) 5 MG Tab Take 1 Tab  "by mouth every day. 30 Tab 11   • SitaGLIPtin-MetFORMIN HCl (JANUMET XR)  MG TABLET SR 24 HR Take 2 Tabs by mouth every day.     • Icosapent Ethyl (VASCEPA) 1 g Cap Take 2 Caps by mouth 2 Times a Day.     • insulin aspart (NOVOLOG) 100 UNIT/ML Solution Inject 30-50 Units as instructed 3 times a day before meals. 30 AM  50 AFTERNOON  30 PM     • [DISCONTINUED] spironolactone (ALDACTONE) 25 MG Tab Take 1 Tab by mouth every day. 30 Tab 2   • [DISCONTINUED] doxycycline monohydrate (ADOXA) 100 MG tablet Take 1 Tab by mouth every 12 hours. (Patient not taking: Reported on 11/29/2017) 8 Tab 0   • [DISCONTINUED] doxycycline (MONODOX) 100 MG capsule Take 1 Cap by mouth 2 times a day. (Patient not taking: Reported on 11/29/2017) 8 Cap 0   • [DISCONTINUED] lisinopril (PRINIVIL) 5 MG Tab Take 5 mg by mouth every day.     • [DISCONTINUED] carvedilol (COREG) 25 MG TABS Take 1 Tab by mouth 2 Times a Day. TWICE DAILY 60 Each 11     No facility-administered encounter medications on file as of 11/29/2017.      Review of Systems   Constitutional: Positive for malaise/fatigue. Negative for fever.   Respiratory: Negative for cough and shortness of breath.    Cardiovascular: Negative for chest pain, palpitations, orthopnea, claudication, leg swelling and PND.   Gastrointestinal: Negative for abdominal pain.   Musculoskeletal: Negative for myalgias.   Neurological: Negative for dizziness.   All other systems reviewed and are negative.       Objective:   /74   Pulse 72   Ht 1.93 m (6' 4\")   Wt 118.4 kg (261 lb)   SpO2 96%   BMI 31.77 kg/m²      Physical Exam   Constitutional: He is oriented to person, place, and time. He appears well-developed and well-nourished. No distress.   HENT:   Head: Normocephalic and atraumatic.   Eyes: EOM are normal.   Neck: Normal range of motion. No JVD present.   Cardiovascular: Normal rate, regular rhythm, normal heart sounds and intact distal pulses.    No murmur heard.  AICD present in " left upper chest without evidence   of erosion, drainage,or erythema.     Pulmonary/Chest: Effort normal and breath sounds normal. No respiratory distress. He has no wheezes. He has no rales.   Abdominal: Soft. Bowel sounds are normal.   Musculoskeletal: Normal range of motion. He exhibits no edema.   Neurological: He is alert and oriented to person, place, and time.   Skin: Skin is warm and dry.   Psychiatric: He has a normal mood and affect.   Nursing note and vitals reviewed.      Assessment:     1. Chronic combined systolic and diastolic heart failure (CMS-HCC)  COMP METABOLIC PANEL    BTYPE NATRIURETIC PEPTIDE    CBC WITHOUT DIFFERENTIAL    MAGNESIUM   2. Type 2 diabetes mellitus with hyperglycemia, with long-term current use of insulin (CMS-HCC)     3. Essential hypertension     4. V-tach (CMS-HCC)  COMP METABOLIC PANEL    MAGNESIUM   5. LBBB (left bundle branch block)     6. Sleep apnea, unspecified type     7. Hyperlipoproteinemia     8. Chronic systolic congestive heart failure (CMS-HCC)  carvedilol (COREG) 25 MG Tab     Medical Decision Making:  Today's Assessment / Status / Plan:     1. rEF of 30%, moderate LVH noted (patient states he had very uncontrolled BP in his youth), FU with HF clinic for review of medications and medication optimization. Continue coreg, lisinopril, and aldactone. Repeat CMP with start of aldactone.    2. DM, insulin dependent. Managed by PCP.    3. HTN, good control on bb, ace, aldactone.    4. Paroxysmal VT, amiodarone reaction. No events on coreg only. Follow with device checks.    5. AICD for rEF and VT, good check today. FU in 5 weeks.    6. EFFIE, unsure if treating. FU at next apt.    7. HLD, no pharmacological therapy at this point. FU with yearly lipid panel.    FU in clinic in 1 month with pacer clinic, HF clinic, and EP    Patient verbalizes understanding and agrees with the plan of care.     Collaborating MD: To MD        No Recipients

## 2017-11-30 NOTE — PROGRESS NOTES
Subjective:   Richard Franklin is a 47 y.o. male who presents today for hospital follow up S/P AICD placement.    He is a patient of Dr. Patrick in our office. Hx of rEF of 30% with dilated cardiomyopathy of unknown etiology (possible sarcoidosis), paroxysmal VT, EFFIE, HTN, HLD, and IDDM.    He is overall doing good. His AICD was checked and no events were noted. He has not felt anything and his site is healing well.    He is back to work. He is taking his medications.    He has had no episodes of chest pain, palpitations, dizziness/lightheadedness, shortness of breath, orthopnea, or peripheral edema.    Past Medical History:   Diagnosis Date   • Chronic combined systolic and diastolic heart failure (CMS-HCC)    • Dilated cardiomyopathy (CMS-HCC)     rEFof 30-35%   • DM (diabetes mellitus) (CMS-HCC)    • History of obesity    • Hyperlipoproteinemia    • Hypertension    • Hypertriglyceridemia    • Mixed hyperlipidemia    • Palpitations    • PVC (premature ventricular contraction)    • Sleep apnea    • Ventricular tachycardia (CMS-HCC)      Past Surgical History:   Procedure Laterality Date   • AICD IMPLANT     • CARDIAC CATH     • OTHER      THROAT SURGERY: Uvulopalatopharyngoplasty.     Family History   Problem Relation Age of Onset   • Hypertension Mother      History   Smoking Status   • Never Smoker   Smokeless Tobacco   • Former User     No Known Allergies  Outpatient Encounter Prescriptions as of 11/29/2017   Medication Sig Dispense Refill   • spironolactone (ALDACTONE) 25 MG Tab Take 1 Tab by mouth every day. 30 Tab 11   • carvedilol (COREG) 25 MG Tab Take 1 Tab by mouth 2 Times a Day. 60 Tab 11   • lisinopril (PRINIVIL) 5 MG Tab Take 1 Tab by mouth every day. 30 Tab 11   • SitaGLIPtin-MetFORMIN HCl (JANUMET XR)  MG TABLET SR 24 HR Take 2 Tabs by mouth every day.     • Icosapent Ethyl (VASCEPA) 1 g Cap Take 2 Caps by mouth 2 Times a Day.     • insulin aspart (NOVOLOG) 100 UNIT/ML Solution Inject 30-50  "Units as instructed 3 times a day before meals. 30 AM  50 AFTERNOON  30 PM     • [DISCONTINUED] spironolactone (ALDACTONE) 25 MG Tab Take 1 Tab by mouth every day. 30 Tab 2   • [DISCONTINUED] doxycycline monohydrate (ADOXA) 100 MG tablet Take 1 Tab by mouth every 12 hours. (Patient not taking: Reported on 11/29/2017) 8 Tab 0   • [DISCONTINUED] doxycycline (MONODOX) 100 MG capsule Take 1 Cap by mouth 2 times a day. (Patient not taking: Reported on 11/29/2017) 8 Cap 0   • [DISCONTINUED] lisinopril (PRINIVIL) 5 MG Tab Take 5 mg by mouth every day.     • [DISCONTINUED] carvedilol (COREG) 25 MG TABS Take 1 Tab by mouth 2 Times a Day. TWICE DAILY 60 Each 11     No facility-administered encounter medications on file as of 11/29/2017.      Review of Systems   Constitutional: Positive for malaise/fatigue. Negative for fever.   Respiratory: Negative for cough and shortness of breath.    Cardiovascular: Negative for chest pain, palpitations, orthopnea, claudication, leg swelling and PND.   Gastrointestinal: Negative for abdominal pain.   Musculoskeletal: Negative for myalgias.   Neurological: Negative for dizziness.   All other systems reviewed and are negative.       Objective:   /74   Pulse 72   Ht 1.93 m (6' 4\")   Wt 118.4 kg (261 lb)   SpO2 96%   BMI 31.77 kg/m²     Physical Exam   Constitutional: He is oriented to person, place, and time. He appears well-developed and well-nourished. No distress.   HENT:   Head: Normocephalic and atraumatic.   Eyes: EOM are normal.   Neck: Normal range of motion. No JVD present.   Cardiovascular: Normal rate, regular rhythm, normal heart sounds and intact distal pulses.    No murmur heard.  AICD present in left upper chest without evidence   of erosion, drainage,or erythema.     Pulmonary/Chest: Effort normal and breath sounds normal. No respiratory distress. He has no wheezes. He has no rales.   Abdominal: Soft. Bowel sounds are normal.   Musculoskeletal: Normal range of " motion. He exhibits no edema.   Neurological: He is alert and oriented to person, place, and time.   Skin: Skin is warm and dry.   Psychiatric: He has a normal mood and affect.   Nursing note and vitals reviewed.      Assessment:     1. Chronic combined systolic and diastolic heart failure (CMS-HCC)  COMP METABOLIC PANEL    BTYPE NATRIURETIC PEPTIDE    CBC WITHOUT DIFFERENTIAL    MAGNESIUM   2. Type 2 diabetes mellitus with hyperglycemia, with long-term current use of insulin (CMS-HCC)     3. Essential hypertension     4. V-tach (CMS-HCC)  COMP METABOLIC PANEL    MAGNESIUM   5. LBBB (left bundle branch block)     6. Sleep apnea, unspecified type     7. Hyperlipoproteinemia     8. Chronic systolic congestive heart failure (CMS-HCC)  carvedilol (COREG) 25 MG Tab     Medical Decision Making:  Today's Assessment / Status / Plan:     1. rEF of 30%, moderate LVH noted (patient states he had very uncontrolled BP in his youth), FU with HF clinic for review of medications and medication optimization. Continue coreg, lisinopril, and aldactone. Repeat CMP with start of aldactone.    2. DM, insulin dependent. Managed by PCP.    3. HTN, good control on bb, ace, aldactone.    4. Paroxysmal VT, amiodarone reaction. No events on coreg only. Follow with device checks.    5. AICD for rEF and VT, good check today. FU in 5 weeks.    6. FEFIE, unsure if treating. FU at next apt.    7. HLD, no pharmacological therapy at this point. FU with yearly lipid panel.    FU in clinic in 1 month with pacer clinic, HF clinic, and EP    Patient verbalizes understanding and agrees with the plan of care.     Collaborating MD: Juan Antonio ESQUIVEL

## 2017-11-30 NOTE — PROGRESS NOTES
Wound site is healing well.  Patient advised to watch for increased redness, swelling, oozing or fever--verbalized understanding.  He will return in 5-6 weeks.

## 2018-01-08 ENCOUNTER — HOSPITAL ENCOUNTER (OUTPATIENT)
Dept: LAB | Facility: MEDICAL CENTER | Age: 48
End: 2018-01-08
Attending: PHYSICIAN ASSISTANT
Payer: COMMERCIAL

## 2018-01-08 ENCOUNTER — HOSPITAL ENCOUNTER (OUTPATIENT)
Dept: LAB | Facility: MEDICAL CENTER | Age: 48
End: 2018-01-08
Attending: NURSE PRACTITIONER
Payer: COMMERCIAL

## 2018-01-08 DIAGNOSIS — I47.20 V-TACH (HCC): ICD-10-CM

## 2018-01-08 DIAGNOSIS — I50.42 CHRONIC COMBINED SYSTOLIC AND DIASTOLIC HEART FAILURE (HCC): Chronic | ICD-10-CM

## 2018-01-08 LAB
25(OH)D3 SERPL-MCNC: 9 NG/ML (ref 30–100)
ALBUMIN SERPL BCP-MCNC: 4.3 G/DL (ref 3.2–4.9)
ALBUMIN SERPL BCP-MCNC: 4.5 G/DL (ref 3.2–4.9)
ALBUMIN/GLOB SERPL: 1.7 G/DL
ALBUMIN/GLOB SERPL: 1.8 G/DL
ALP SERPL-CCNC: 40 U/L (ref 30–99)
ALP SERPL-CCNC: 41 U/L (ref 30–99)
ALT SERPL-CCNC: 16 U/L (ref 2–50)
ALT SERPL-CCNC: 16 U/L (ref 2–50)
ANION GAP SERPL CALC-SCNC: 9 MMOL/L (ref 0–11.9)
ANION GAP SERPL CALC-SCNC: 9 MMOL/L (ref 0–11.9)
AST SERPL-CCNC: 13 U/L (ref 12–45)
AST SERPL-CCNC: 15 U/L (ref 12–45)
BASOPHILS # BLD AUTO: 1 % (ref 0–1.8)
BASOPHILS # BLD: 0.06 K/UL (ref 0–0.12)
BILIRUB SERPL-MCNC: 0.8 MG/DL (ref 0.1–1.5)
BILIRUB SERPL-MCNC: 0.8 MG/DL (ref 0.1–1.5)
BNP SERPL-MCNC: 145 PG/ML (ref 0–100)
BUN SERPL-MCNC: 24 MG/DL (ref 8–22)
BUN SERPL-MCNC: 24 MG/DL (ref 8–22)
CALCIUM SERPL-MCNC: 9.3 MG/DL (ref 8.5–10.5)
CALCIUM SERPL-MCNC: 9.3 MG/DL (ref 8.5–10.5)
CHLORIDE SERPL-SCNC: 100 MMOL/L (ref 96–112)
CHLORIDE SERPL-SCNC: 102 MMOL/L (ref 96–112)
CHOLEST SERPL-MCNC: 203 MG/DL (ref 100–199)
CO2 SERPL-SCNC: 25 MMOL/L (ref 20–33)
CO2 SERPL-SCNC: 25 MMOL/L (ref 20–33)
CREAT SERPL-MCNC: 1.06 MG/DL (ref 0.5–1.4)
CREAT SERPL-MCNC: 1.1 MG/DL (ref 0.5–1.4)
CREAT UR-MCNC: 99.1 MG/DL
EOSINOPHIL # BLD AUTO: 0.21 K/UL (ref 0–0.51)
EOSINOPHIL NFR BLD: 3.4 % (ref 0–6.9)
ERYTHROCYTE [DISTWIDTH] IN BLOOD BY AUTOMATED COUNT: 43.4 FL (ref 35.9–50)
ERYTHROCYTE [DISTWIDTH] IN BLOOD BY AUTOMATED COUNT: 44 FL (ref 35.9–50)
EST. AVERAGE GLUCOSE BLD GHB EST-MCNC: 266 MG/DL
GLOBULIN SER CALC-MCNC: 2.5 G/DL (ref 1.9–3.5)
GLOBULIN SER CALC-MCNC: 2.6 G/DL (ref 1.9–3.5)
GLUCOSE SERPL-MCNC: 374 MG/DL (ref 65–99)
GLUCOSE SERPL-MCNC: 377 MG/DL (ref 65–99)
HBA1C MFR BLD: 10.9 % (ref 0–5.6)
HCT VFR BLD AUTO: 46.3 % (ref 42–52)
HCT VFR BLD AUTO: 47.1 % (ref 42–52)
HDLC SERPL-MCNC: 32 MG/DL
HGB BLD-MCNC: 15.9 G/DL (ref 14–18)
HGB BLD-MCNC: 16.2 G/DL (ref 14–18)
IMM GRANULOCYTES # BLD AUTO: 0.02 K/UL (ref 0–0.11)
IMM GRANULOCYTES NFR BLD AUTO: 0.3 % (ref 0–0.9)
LDLC SERPL CALC-MCNC: 95 MG/DL
LYMPHOCYTES # BLD AUTO: 1.72 K/UL (ref 1–4.8)
LYMPHOCYTES NFR BLD: 28 % (ref 22–41)
MAGNESIUM SERPL-MCNC: 1.7 MG/DL (ref 1.5–2.5)
MCH RBC QN AUTO: 30.5 PG (ref 27–33)
MCH RBC QN AUTO: 30.7 PG (ref 27–33)
MCHC RBC AUTO-ENTMCNC: 34.3 G/DL (ref 33.7–35.3)
MCHC RBC AUTO-ENTMCNC: 34.4 G/DL (ref 33.7–35.3)
MCV RBC AUTO: 88.5 FL (ref 81.4–97.8)
MCV RBC AUTO: 89.4 FL (ref 81.4–97.8)
MICROALBUMIN UR-MCNC: 4.7 MG/DL
MICROALBUMIN/CREAT UR: 47 MG/G (ref 0–30)
MONOCYTES # BLD AUTO: 0.56 K/UL (ref 0–0.85)
MONOCYTES NFR BLD AUTO: 9.1 % (ref 0–13.4)
NEUTROPHILS # BLD AUTO: 3.57 K/UL (ref 1.82–7.42)
NEUTROPHILS NFR BLD: 58.2 % (ref 44–72)
NRBC # BLD AUTO: 0 K/UL
NRBC BLD-RTO: 0 /100 WBC
PLATELET # BLD AUTO: 165 K/UL (ref 164–446)
PLATELET # BLD AUTO: 166 K/UL (ref 164–446)
PMV BLD AUTO: 11.3 FL (ref 9–12.9)
PMV BLD AUTO: 11.3 FL (ref 9–12.9)
POTASSIUM SERPL-SCNC: 3.5 MMOL/L (ref 3.6–5.5)
POTASSIUM SERPL-SCNC: 3.6 MMOL/L (ref 3.6–5.5)
PROT SERPL-MCNC: 6.9 G/DL (ref 6–8.2)
PROT SERPL-MCNC: 7 G/DL (ref 6–8.2)
RBC # BLD AUTO: 5.18 M/UL (ref 4.7–6.1)
RBC # BLD AUTO: 5.32 M/UL (ref 4.7–6.1)
SODIUM SERPL-SCNC: 134 MMOL/L (ref 135–145)
SODIUM SERPL-SCNC: 136 MMOL/L (ref 135–145)
T4 FREE SERPL-MCNC: 1.06 NG/DL (ref 0.53–1.43)
TRIGL SERPL-MCNC: 378 MG/DL (ref 0–149)
TSH SERPL DL<=0.005 MIU/L-ACNC: 1.27 UIU/ML (ref 0.38–5.33)
WBC # BLD AUTO: 6.1 K/UL (ref 4.8–10.8)
WBC # BLD AUTO: 6.3 K/UL (ref 4.8–10.8)

## 2018-01-08 PROCEDURE — 85025 COMPLETE CBC W/AUTO DIFF WBC: CPT

## 2018-01-08 PROCEDURE — 36415 COLL VENOUS BLD VENIPUNCTURE: CPT

## 2018-01-08 PROCEDURE — 80061 LIPID PANEL: CPT

## 2018-01-08 PROCEDURE — 82306 VITAMIN D 25 HYDROXY: CPT

## 2018-01-08 PROCEDURE — 83880 ASSAY OF NATRIURETIC PEPTIDE: CPT

## 2018-01-08 PROCEDURE — 82043 UR ALBUMIN QUANTITATIVE: CPT

## 2018-01-08 PROCEDURE — 84443 ASSAY THYROID STIM HORMONE: CPT

## 2018-01-08 PROCEDURE — 84439 ASSAY OF FREE THYROXINE: CPT

## 2018-01-08 PROCEDURE — 84270 ASSAY OF SEX HORMONE GLOBUL: CPT

## 2018-01-08 PROCEDURE — 80053 COMPREHEN METABOLIC PANEL: CPT

## 2018-01-08 PROCEDURE — 83036 HEMOGLOBIN GLYCOSYLATED A1C: CPT

## 2018-01-08 PROCEDURE — 80053 COMPREHEN METABOLIC PANEL: CPT | Mod: 91

## 2018-01-08 PROCEDURE — 85027 COMPLETE CBC AUTOMATED: CPT

## 2018-01-08 PROCEDURE — 84402 ASSAY OF FREE TESTOSTERONE: CPT

## 2018-01-08 PROCEDURE — 83735 ASSAY OF MAGNESIUM: CPT

## 2018-01-08 PROCEDURE — 82570 ASSAY OF URINE CREATININE: CPT

## 2018-01-08 PROCEDURE — 84403 ASSAY OF TOTAL TESTOSTERONE: CPT

## 2018-01-09 ENCOUNTER — TELEPHONE (OUTPATIENT)
Dept: CARDIOLOGY | Facility: MEDICAL CENTER | Age: 48
End: 2018-01-09

## 2018-01-09 ENCOUNTER — APPOINTMENT (OUTPATIENT)
Dept: CARDIOLOGY | Facility: MEDICAL CENTER | Age: 48
End: 2018-01-09
Payer: COMMERCIAL

## 2018-01-09 ENCOUNTER — OFFICE VISIT (OUTPATIENT)
Dept: CARDIOLOGY | Facility: MEDICAL CENTER | Age: 48
End: 2018-01-09
Payer: COMMERCIAL

## 2018-01-09 VITALS
WEIGHT: 259 LBS | HEART RATE: 76 BPM | OXYGEN SATURATION: 95 % | SYSTOLIC BLOOD PRESSURE: 122 MMHG | HEIGHT: 76 IN | BODY MASS INDEX: 31.54 KG/M2 | DIASTOLIC BLOOD PRESSURE: 72 MMHG

## 2018-01-09 DIAGNOSIS — I49.3 PVC (PREMATURE VENTRICULAR CONTRACTION): ICD-10-CM

## 2018-01-09 DIAGNOSIS — I47.20 VT (VENTRICULAR TACHYCARDIA) (HCC): ICD-10-CM

## 2018-01-09 LAB
EKG IMPRESSION: NORMAL
SHBG SERPL-SCNC: 25 NMOL/L (ref 11–80)
TESTOST FREE MFR SERPL: 2.1 % (ref 1.6–2.9)
TESTOST FREE SERPL-MCNC: 65 PG/ML (ref 47–244)
TESTOST SERPL-MCNC: 312 NG/DL (ref 300–890)

## 2018-01-09 PROCEDURE — 93000 ELECTROCARDIOGRAM COMPLETE: CPT | Mod: 59 | Performed by: INTERNAL MEDICINE

## 2018-01-09 PROCEDURE — 93284 PRGRMG EVAL IMPLANTABLE DFB: CPT | Performed by: INTERNAL MEDICINE

## 2018-01-09 PROCEDURE — 99024 POSTOP FOLLOW-UP VISIT: CPT | Mod: 25 | Performed by: INTERNAL MEDICINE

## 2018-01-09 ASSESSMENT — ENCOUNTER SYMPTOMS
BACK PAIN: 1
TINGLING: 1

## 2018-01-09 NOTE — PROGRESS NOTES
Subjective:   Richard Franklin is a 47 y.o. male who presents today to follow up after biv icd    Chief Complaint: does not feel extra heart beats    He has [place] icd.  Only 80% biV pacing due to prequent ectopy.    Does not feel the skipping beats  If he is pushing himself he feels palpiaitons and skipping but otherwise does not notice.  Does nto do that much anymore since back injury 4 years.      With vt got up at 430 am felt fine when got up to pee.  520am sudden dull pressure and felt really bad.  Passed out.  Wife called 911  Presented with vt with hr 220    Originally diagnosed with chf 2005  Echo 2013 with ef 45-50  Up and down 30 and 35 recent hospitalization.        Past Medical History:   Diagnosis Date   • Chronic combined systolic and diastolic heart failure (CMS-HCC)    • Dilated cardiomyopathy (CMS-HCC)     rEFof 30-35%   • DM (diabetes mellitus) (CMS-HCC)    • History of obesity    • Hyperlipoproteinemia    • Hypertension    • Hypertriglyceridemia    • Mixed hyperlipidemia    • Palpitations    • PVC (premature ventricular contraction)    • Sleep apnea    • Ventricular tachycardia (CMS-HCC)      Past Surgical History:   Procedure Laterality Date   • AICD IMPLANT     • CARDIAC CATH     • OTHER      THROAT SURGERY: Uvulopalatopharyngoplasty.     Family History   Problem Relation Age of Onset   • Hypertension Mother      History   Smoking Status   • Never Smoker   Smokeless Tobacco   • Former User     No Known Allergies  Outpatient Encounter Prescriptions as of 1/9/2018   Medication Sig Dispense Refill   • spironolactone (ALDACTONE) 25 MG Tab Take 1 Tab by mouth every day. 30 Tab 11   • carvedilol (COREG) 25 MG Tab Take 1 Tab by mouth 2 Times a Day. 60 Tab 11   • [DISCONTINUED] lisinopril (PRINIVIL) 5 MG Tab Take 1 Tab by mouth every day. 30 Tab 11   • Icosapent Ethyl (VASCEPA) 1 g Cap Take 2 Caps by mouth 2 Times a Day.     • insulin aspart (NOVOLOG) 100 UNIT/ML Solution Inject 30-50 Units as  "instructed 3 times a day before meals. 30 AM  50 AFTERNOON  30 PM     • SitaGLIPtin-MetFORMIN HCl (JANUMET XR)  MG TABLET SR 24 HR Take 2 Tabs by mouth every day.       No facility-administered encounter medications on file as of 1/9/2018.      Review of Systems   Musculoskeletal: Positive for back pain.   Neurological: Positive for tingling.   Endo/Heme/Allergies: Positive for environmental allergies.   All other systems reviewed and are negative.       Objective:   /72   Pulse 76   Ht 1.93 m (6' 3.98\")   Wt 117.5 kg (259 lb)   SpO2 95%   BMI 31.54 kg/m²     Physical Exam   Constitutional: He is oriented to person, place, and time. He appears well-developed and well-nourished. No distress.   HENT:   Head: Normocephalic and atraumatic.   Right Ear: External ear normal.   Left Ear: External ear normal.   Nose: Nose normal.   Mouth/Throat: Oropharynx is clear and moist.   Eyes: Conjunctivae and EOM are normal. Pupils are equal, round, and reactive to light. Right eye exhibits no discharge. Left eye exhibits no discharge. No scleral icterus.   Neck: Normal range of motion. Neck supple. No JVD present. No tracheal deviation present.   Cardiovascular: Normal rate, regular rhythm, normal heart sounds and intact distal pulses.  Exam reveals no gallop and no friction rub.    No murmur heard.  Very well healed   Pulmonary/Chest: Effort normal and breath sounds normal. No stridor. No respiratory distress. He has no wheezes. He has no rales. He exhibits no tenderness.   Abdominal: Soft. He exhibits no distension. There is no tenderness.   Musculoskeletal: He exhibits no edema or tenderness.   Neurological: He is alert and oriented to person, place, and time. No cranial nerve deficit. Coordination normal.   Skin: Skin is warm and dry. No rash noted. He is not diaphoretic. No erythema. No pallor.   Psychiatric: He has a normal mood and affect. His behavior is normal. Judgment and thought content normal. "   Vitals reviewed.      Assessment:     1. VT (ventricular tachycardia) (CMS-[facility])  EKG   2. PVC (premature ventricular contraction)  EKG    Echocardiogram Comp w/o Cont     Frequent vpc.  Morphology different from clinical presentation with vt.  This appears to be annular (epicardial appearance but many annular vt have slow upstroke even when can be reached epicardially.  Medical Decision Making:  Today's Assessment / Status / Plan:   Frequent pvc- he does not feel it. There is obvious dyssynchrony from this beat on exam with big pulse, little pulse and no palpable pulse with the pause.  Will get baseline echo now out from arrest.    If low, will try to be more aggressive with pvc suppression-likely with mexilitine    Borderline dft- now off amio- should be better.  No vt in device.

## 2018-01-10 NOTE — TELEPHONE ENCOUNTER
Per PAB patient was advised he is to have is INR done along with labs given at visit 1/9/18. Faxed order & L/M [id number] to [facility] [phone], [phone]

## 2018-01-11 ENCOUNTER — OFFICE VISIT (OUTPATIENT)
Dept: CARDIOLOGY | Facility: MEDICAL CENTER | Age: 48
End: 2018-01-11
Payer: COMMERCIAL

## 2018-01-11 VITALS
HEART RATE: 60 BPM | OXYGEN SATURATION: 97 % | HEIGHT: 76 IN | BODY MASS INDEX: 31.66 KG/M2 | DIASTOLIC BLOOD PRESSURE: 80 MMHG | WEIGHT: 260 LBS | SYSTOLIC BLOOD PRESSURE: 130 MMHG

## 2018-01-11 DIAGNOSIS — I47.20 VT (VENTRICULAR TACHYCARDIA) (HCC): ICD-10-CM

## 2018-01-11 DIAGNOSIS — I50.22 CHRONIC SYSTOLIC HEART FAILURE (HCC): ICD-10-CM

## 2018-01-11 DIAGNOSIS — I44.7 LBBB (LEFT BUNDLE BRANCH BLOCK): ICD-10-CM

## 2018-01-11 DIAGNOSIS — R06.09 DYSPNEA ON EXERTION: ICD-10-CM

## 2018-01-11 DIAGNOSIS — I42.8 NON-ISCHEMIC CARDIOMYOPATHY (HCC): ICD-10-CM

## 2018-01-11 DIAGNOSIS — I50.20 ACC/AHA STAGE C SYSTOLIC HEART FAILURE (HCC): ICD-10-CM

## 2018-01-11 DIAGNOSIS — I51.89 LEFT VENTRICULAR SYSTOLIC DYSFUNCTION, NYHA CLASS 1: ICD-10-CM

## 2018-01-11 DIAGNOSIS — I10 HTN (HYPERTENSION), MALIGNANT: ICD-10-CM

## 2018-01-11 DIAGNOSIS — I49.3 PVC (PREMATURE VENTRICULAR CONTRACTION): ICD-10-CM

## 2018-01-11 DIAGNOSIS — Z95.810 ICD (IMPLANTABLE CARDIOVERTER-DEFIBRILLATOR) IN PLACE: ICD-10-CM

## 2018-01-11 DIAGNOSIS — Z79.899 HIGH RISK MEDICATION USE: ICD-10-CM

## 2018-01-11 PROCEDURE — 99215 OFFICE O/P EST HI 40 MIN: CPT | Mod: 25 | Performed by: INTERNAL MEDICINE

## 2018-01-11 PROCEDURE — 94618 PULMONARY STRESS TESTING: CPT | Performed by: INTERNAL MEDICINE

## 2018-01-11 RX ORDER — HYDROCODONE BITARTRATE AND ACETAMINOPHEN 5; 325 MG/1; MG/1
1-2 TABLET ORAL EVERY 4 HOURS PRN
COMMUNITY
End: 2018-10-23

## 2018-01-11 RX ORDER — LISINOPRIL 20 MG/1
20 TABLET ORAL DAILY
Qty: 90 TAB | Refills: 3 | Status: SHIPPED | OUTPATIENT
Start: 2018-01-11 | End: 2018-04-23 | Stop reason: SDUPTHER

## 2018-01-11 RX ORDER — CELECOXIB 200 MG/1
200 CAPSULE ORAL 2 TIMES DAILY
COMMUNITY
End: 2018-10-23

## 2018-01-11 ASSESSMENT — ENCOUNTER SYMPTOMS
SHORTNESS OF BREATH: 1
BRUISES/BLEEDS EASILY: 0
HEADACHES: 0
DIAPHORESIS: 0
SENSORY CHANGE: 0
FALLS: 0
ABDOMINAL PAIN: 0
MEMORY LOSS: 0
DEPRESSION: 0
DOUBLE VISION: 0
FEVER: 0
COUGH: 0
PALPITATIONS: 0
DIZZINESS: 0
BLURRED VISION: 0
MYALGIAS: 0

## 2018-01-11 ASSESSMENT — MINNESOTA LIVING WITH HEART FAILURE QUESTIONNAIRE (MLHF)
EATING LESS FOODS YOU LIKE: 0
MAKING YOU STAY IN A HOSPITAL: 0
MAKING YOU SHORT OF BREATH: 0
LOSS OF SELF CONTROL IN YOUR LIFE: 0
DIFFICULTY TO CONCENTRATE OR REMEMBERING THINGS: 0
SWELLING IN ANKLES OR LEGS: 0
DIFFICULTY WORKING TO EARN A LIVING: 0
TOTAL_SCORE: 6
WALKING ABOUT OR CLIMBING STAIRS DIFFICULT: 0
FEELING LIKE A BURDEN TO FAMILY AND FRIENDS: 0
DIFFICULTY GOING AWAY FROM HOME: 0
DIFFICULTY WITH SEXUAL ACTIVITIES: 0
MAKING YOU FEEL DEPRESSED: 0
HAVING TO SIT OR LIE DOWN DURING THE DAY: 0
TIRED, FATIGUED OR LOW ON ENERGY: 1
DIFFICULTY SOCIALIZING WITH FAMILY OR FRIENDS: 0
DIFFICULTY SLEEPING WELL AT NIGHT: 0
WORKING AROUND THE HOUSE OR YARD DIFFICULT: 0
MAKING YOU WORRY: 2
DIFFICULTY WITH RECREATIONAL PASTIMES, SPORTS, HOBBIES: 1
GIVING YOU SIDE EFFECTS FROM TREATMENTS: 0
COSTING YOU MONEY FOR MEDICAL CARE: 2

## 2018-01-11 ASSESSMENT — 6 MINUTE WALK TEST (6MWT): TOTAL DISTANCE WALKED (METERS): 445

## 2018-01-11 ASSESSMENT — NEW YORK HEART ASSOCIATION (NYHA) CLASSIFICATION: NYHA FUNCTIONAL CLASS: CLASS L

## 2018-01-11 NOTE — PROGRESS NOTES
Subjective:   Richard Franklin is a 47 y.o. male who presents today For cardiac care and evaluation and management of congestive heart failure. Patient does have a history of cardiomyopathy for many years. It is thought to be related to untreated hypertension. Recently in November 2017, patient was in the hospital because of ventricular tachycardia. Patient had an invasive coronary angiogram at that time which showed nonobstructive CAD with 60% mid LAD lesion without significant hemodynamic evaluated by FFR. Patient underwent biventricular ICD placement for secondary prevention. Currently, he is only paced 80% of the time due to ectopies.    Patient is feeling very well. No dyspnea. He is very active. Able to walk up 2 flights of stairs without problems.    Chief Complaint: exertional dyspnea.    Past Medical History:   Diagnosis Date   • Chronic combined systolic and diastolic heart failure (CMS-HCC)    • Dilated cardiomyopathy (CMS-HCC)     rEFof 30-35%   • DM (diabetes mellitus) (CMS-HCC)    • History of obesity    • Hyperlipoproteinemia    • Hypertension    • Hypertriglyceridemia    • Mixed hyperlipidemia    • Palpitations    • PVC (premature ventricular contraction)    • Sleep apnea    • Ventricular tachycardia (CMS-HCC)      Past Surgical History:   Procedure Laterality Date   • AICD IMPLANT     • CARDIAC CATH     • OTHER      THROAT SURGERY: Uvulopalatopharyngoplasty.     Family History   Problem Relation Age of Onset   • Hypertension Mother      History   Smoking Status   • Never Smoker   Smokeless Tobacco   • Former User     No Known Allergies  Outpatient Encounter Prescriptions as of 1/11/2018   Medication Sig Dispense Refill   • hydrocodone-acetaminophen (NORCO) 5-325 MG Tab per tablet Take 1-2 Tabs by mouth every four hours as needed.     • celecoxib (CELEBREX) 200 MG Cap Take 200 mg by mouth 2 times a day.     • lisinopril (PRINIVIL) 20 MG Tab Take 1 Tab by mouth every day. 90 Tab 3   • spironolactone  "(ALDACTONE) 25 MG Tab Take 1 Tab by mouth every day. 30 Tab 11   • carvedilol (COREG) 25 MG Tab Take 1 Tab by mouth 2 Times a Day. 60 Tab 11   • SitaGLIPtin-MetFORMIN HCl (JANUMET XR)  MG TABLET SR 24 HR Take 2 Tabs by mouth every day.     • Icosapent Ethyl (VASCEPA) 1 g Cap Take 2 Caps by mouth 2 Times a Day.     • insulin aspart (NOVOLOG) 100 UNIT/ML Solution Inject 30-50 Units as instructed 3 times a day before meals. 30 AM  50 AFTERNOON  30 PM     • [DISCONTINUED] lisinopril (PRINIVIL) 5 MG Tab Take 1 Tab by mouth every day. 30 Tab 11     No facility-administered encounter medications on file as of 1/11/2018.      Review of Systems   Constitutional: Negative for diaphoresis and fever.   HENT: Negative for nosebleeds.    Eyes: Negative for blurred vision and double vision.   Respiratory: Positive for shortness of breath. Negative for cough.    Cardiovascular: Negative for chest pain and palpitations.   Gastrointestinal: Negative for abdominal pain.   Genitourinary: Negative for dysuria and frequency.   Musculoskeletal: Negative for falls and myalgias.   Skin: Negative for rash.   Neurological: Negative for dizziness, sensory change and headaches.   Endo/Heme/Allergies: Does not bruise/bleed easily.   Psychiatric/Behavioral: Negative for depression and memory loss.        Objective:   /80   Pulse 60   Ht 1.93 m (6' 4\")   Wt 117.9 kg (260 lb)   SpO2 97%   BMI 31.65 kg/m²     Physical Exam   Constitutional: He is oriented to person, place, and time. He appears well-developed and well-nourished.   HENT:   Head: Normocephalic and atraumatic.   Eyes: EOM are normal.   Neck: Normal range of motion. No JVD present.   Cardiovascular: Normal rate, regular rhythm, normal heart sounds and intact distal pulses.  Exam reveals no gallop and no friction rub.    No murmur heard.  Bilateral femoral pulses are 2+, bilateral dorsalis pedis pulses are 2+, bilateral posterior tibialis pulses are 2+. "   Pulmonary/Chest: No respiratory distress. He has no wheezes. He has no rales. He exhibits no tenderness.   Abdominal: Soft. Bowel sounds are normal. There is no tenderness. There is no rebound and no guarding.   The is no presence of abdominal bruits   Musculoskeletal: Normal range of motion.   Neurological: He is alert and oriented to person, place, and time.   Skin: Skin is warm and dry.   Psychiatric: He has a normal mood and affect.   Nursing note and vitals reviewed.      Assessment:     1. ACC/AHA stage C systolic heart failure (CMS-HCC)  lisinopril (PRINIVIL) 20 MG Tab    MD PULMONARY STRESS TESTING 6 MIN WALK   2. Non-ischemic cardiomyopathy (CMS-HCC)  lisinopril (PRINIVIL) 20 MG Tab    MD PULMONARY STRESS TESTING 6 MIN WALK   3. HTN (hypertension), malignant  lisinopril (PRINIVIL) 20 MG Tab   4. VT (ventricular tachycardia) (CMS-[facility])     5. LBBB (left bundle branch block)     6. PVC (premature ventricular contraction)     7. ICD (implantable cardioverter-defibrillator) in place     8. Chronic systolic heart failure (CMS-[facility])     9. Dyspnea on exertion  MD PULMONARY STRESS TESTING 6 MIN WALK   10. Left ventricular systolic dysfunction, NYHA class 1  lisinopril (PRINIVIL) 20 MG Tab   11. High risk medication use         Medical Decision Making:  Today's Assessment / Status / Plan:   Today, based on physical examination findings, patient is euvolemic. No JVD, lungs are clear to auscultation, no pitting edema in bilateral lower extremities, no ascites.    Dry weight is 260 lbs.    Patient is NYHA class 1, not candidate for Entresto therapy at this time.    Will increase Lisinopril to 20 mg po daily.  Will continue Coreg 25 mg po bid, Spironolactone 25 mg po daily.    Will continue to closely monitor for side effects of patient's high risk medication(s) including liver, renal function and electrolytes.    I will see patient back in our Heart Failure Clinic with lab tests and studies results in 4 weeks.    I  thank you Dr. Mares for referring patient to our Heart Failure Clinic today.

## 2018-01-11 NOTE — LETTER
[facility]   [address], [address]  [place] [address]  Phone: [phone]  Fax: [phone]              Richard Franklin  1970    Encounter Date: 1/11/2018    Jessica Romano M.D.          PROGRESS NOTE:  Subjective:   Richard Franklin is a 47 y.o. male who presents today For cardiac care and evaluation and management of congestive heart failure. Patient does have a history of cardiomyopathy for many years. It is thought to be related to untreated hypertension. Recently in November 2017, patient was in the hospital because of ventricular tachycardia. Patient had an invasive coronary angiogram at that time which showed nonobstructive CAD with 60% mid LAD lesion without significant hemodynamic evaluated by FFR. Patient underwent biventricular ICD placement for secondary prevention. Currently, he is only paced 80% of the time due to ectopies.    Patient is feeling very well. No dyspnea. He is very active. Able to walk up 2 flights of stairs without problems.    Chief Complaint: exertional dyspnea.    Past Medical History:   Diagnosis Date   • Chronic combined systolic and diastolic heart failure (CMS-HCC)    • Dilated cardiomyopathy (CMS-HCC)     rEFof 30-35%   • DM (diabetes mellitus) (CMS-HCC)    • History of obesity    • Hyperlipoproteinemia    • Hypertension    • Hypertriglyceridemia    • Mixed hyperlipidemia    • Palpitations    • PVC (premature ventricular contraction)    • Sleep apnea    • Ventricular tachycardia (CMS-HCC)      Past Surgical History:   Procedure Laterality Date   • AICD IMPLANT     • CARDIAC CATH     • OTHER      THROAT SURGERY: Uvulopalatopharyngoplasty.     Family History   Problem Relation Age of Onset   • Hypertension Mother      History   Smoking Status   • Never Smoker   Smokeless Tobacco   • Former User     No Known Allergies  Outpatient Encounter Prescriptions as of 1/11/2018   Medication Sig Dispense Refill   •  "hydrocodone-acetaminophen (NORCO) 5-325 MG Tab per tablet Take 1-2 Tabs by mouth every four hours as needed.     • celecoxib (CELEBREX) 200 MG Cap Take 200 mg by mouth 2 times a day.     • spironolactone (ALDACTONE) 25 MG Tab Take 1 Tab by mouth every day. 30 Tab 11   • carvedilol (COREG) 25 MG Tab Take 1 Tab by mouth 2 Times a Day. 60 Tab 11   • lisinopril (PRINIVIL) 5 MG Tab Take 1 Tab by mouth every day. 30 Tab 11   • SitaGLIPtin-MetFORMIN HCl (JANUMET XR)  MG TABLET SR 24 HR Take 2 Tabs by mouth every day.     • Icosapent Ethyl (VASCEPA) 1 g Cap Take 2 Caps by mouth 2 Times a Day.     • insulin aspart (NOVOLOG) 100 UNIT/ML Solution Inject 30-50 Units as instructed 3 times a day before meals. 30 AM  50 AFTERNOON  30 PM       No facility-administered encounter medications on file as of 1/11/2018.      Review of Systems   Constitutional: Negative for diaphoresis and fever.   HENT: Negative for nosebleeds.    Eyes: Negative for blurred vision and double vision.   Respiratory: Positive for shortness of breath. Negative for cough.    Cardiovascular: Negative for chest pain and palpitations.   Gastrointestinal: Negative for abdominal pain.   Genitourinary: Negative for dysuria and frequency.   Musculoskeletal: Negative for falls and myalgias.   Skin: Negative for rash.   Neurological: Negative for dizziness, sensory change and headaches.   Endo/Heme/Allergies: Does not bruise/bleed easily.   Psychiatric/Behavioral: Negative for depression and memory loss.        Objective:   /80   Pulse 60   Ht 1.93 m (6' 4\")   Wt 117.9 kg (260 lb)   SpO2 97%   BMI 31.65 kg/m²      Physical Exam   Constitutional: He is oriented to person, place, and time. He appears well-developed and well-nourished.   HENT:   Head: Normocephalic and atraumatic.   Eyes: EOM are normal.   Neck: Normal range of motion. No JVD present.   Cardiovascular: Normal rate, regular rhythm, normal heart sounds and intact distal pulses.  Exam " reveals no gallop and no friction rub.    No murmur heard.  Bilateral femoral pulses are 2+, bilateral dorsalis pedis pulses are 2+, bilateral posterior tibialis pulses are 2+.   Pulmonary/Chest: No respiratory distress. He has no wheezes. He has no rales. He exhibits no tenderness.   Abdominal: Soft. Bowel sounds are normal. There is no tenderness. There is no rebound and no guarding.   The is no presence of abdominal bruits   Musculoskeletal: Normal range of motion.   Neurological: He is alert and oriented to person, place, and time.   Skin: Skin is warm and dry.   Psychiatric: He has a normal mood and affect.   Nursing note and vitals reviewed.      Assessment:     1. ACC/AHA stage C systolic heart failure (CMS-HCC)     2. Left ventricular systolic dysfunction, NYHA class 2     3. Non-ischemic cardiomyopathy (CMS-HCC)     4. HTN (hypertension), malignant     5. VT (ventricular tachycardia) (CMS-HCC)     6. LBBB (left bundle branch block)     7. PVC (premature ventricular contraction)     8. ICD (implantable cardioverter-defibrillator) in place     9. Chronic systolic heart failure (CMS-HCC)     10. Dyspnea on exertion         Medical Decision Making:  Today's Assessment / Status / Plan:   Today, based on physical examination findings, patient is euvolemic. No JVD, lungs are clear to auscultation, no pitting edema in bilateral lower extremities, no ascites.    Dry weight is 260 lbs.            Ravi Mares M.D.  [address]  [address]  [place] [id number]  VIA Facsimile: [phone]

## 2018-02-06 LAB
EKG IMPRESSION: NORMAL
EKG IMPRESSION: NORMAL

## 2018-02-06 NOTE — PROGRESS NOTES
"Heart Failure New Appointment     6MWT- 445 meters  MLWHF- 6    OP Heart Failure  Vitals  Appointment Type: Heart Failure New  Weight: 117.9 kg (260 lb)  Height: 193 cm (6' 4\")  BMI (Calculated): 31.65  Blood Pressure: 130/80  Pulse: 60    System Assessment  NYHA Functional Class Assessment: Class l  ACC/AHA HF Stage: C    Smoking Hx  Have you Ever Smoked: Never     Alcohol Hx  Do you Drink?: No     Illicit Drug Hx  Illicit Drug History: No    Social Hx  Social History: Lives with Spouse  Level of Support: Good  Advance Directives: Began discussion, Paperwork provided    Education  Symptoms: Verbalizes understanding  Weighing: Verbalizes Understanding  Weight Gain Response: Verbalizes Understanding   Recording Data: Verbalizes understanding  Teach Back Failures: Teach Back Successful  Compliance: Patient is Compliant     Medications  Medication Reconciliation : Complete  Medication Counseling Provided: Verbalizes Understanding  Able to Accurately Identify Medication Indications: Some  Medication Discrepancies: None  Is Patient on an Evidence Based Beta Blocker: Yes  Is Patient on ACE-1 or ARB: Yes    Dietary Assessment  Food Labels: Verbalizes Understanding  Foods High in Sodium: Verbalizes Understanding  Daily Sodium Intake: Verbalizes Understanding  Diet: Verbalizes Understanding  Food Preparation: Verbalizes Understanding  Eating Out Plan: Verbalizes understanding  Healthier Options: Verbalizes Understanding  Fluid Restriction: Not Applicable    MN Living with Heart Failure  Swelling in Ankles or Legs: 0  Having to Sit or Lie Down During the Day: 0  Walking About or Climbing Stairs Difficult: 0  Working Around the House or Yard Difficult: 0  Difficulty Going Away from Home: 0  Difficulty Sleeping Well at Night: 0  Difficulty Socializing with Family or Friends: 0  Difficulty Working to Earn a Livin  Difficulty with Recreational Pastimes, Sports, Hobbies: 1  Difficulty with Sexual Activities: 0  Eating Less " Foods You Like: 0  Making you Short of Breath: 0  Tired, Fatigued or Low on Energy: 1  Making you Stay in a Hospital: 0  Costing you Money for Medical Care?: 2  Giving you Side Effects from Treatments: 0  Feeling like a [place] to Family and Friends: 0  Loss of Self Control in your Life: 0  Making You Worry: 2  Difficulty to Concentrate or Remembering Things: 0  Making you Feel Depressed: 0  MLHF Total Score : 6    6 Minute Walk Test  Baseline to end of test: 6:00  Total meters walked: 445       Education Narrative  Reviewed anatomy and physiology of heart failure with patient. Went over heart failure worksheet and patient's individual HF diagnosis, EF, risk factors, general medication classes and indications, as well as personal goals.  Goals: Patient's primary goal is to improve his EF.     Discussed daily weights, sodium restriction, worsening signs and symptoms to report to physician, heart medications, and importance of adherence to medication regimen. Emphasized recommendation from AHA/AAHFN to keep daily sodium intake between 1500mg-2000mg.     Reviewed dietary handouts, and advance directive planning handout. Discussed dietary considerations and reviewed Seven Day Heart Healthy Meal Plan by [employer].    Invited patient and family members/friends to HF support group and encouraged patient to call Heart Failure clinic during normal business hours with any questions.  Heart Failure program card with number given to patient.        Patient states full understanding of all information given.     Mónica HF RN  [phone]

## 2018-02-13 ENCOUNTER — TELEPHONE (OUTPATIENT)
Dept: CARDIOLOGY | Facility: MEDICAL CENTER | Age: 48
End: 2018-02-13

## 2018-02-13 NOTE — TELEPHONE ENCOUNTER
Called pt to see what is needed. Pt returned to work in December. He needed an official note for HR that it was ok for him to be working without restrictions. Letter faxed.

## 2018-02-13 NOTE — TELEPHONE ENCOUNTER
----- Message from Inez Gustafson R.N. sent at 2/13/2018 10:10 AM PST -----  Regarding: FW: Patient needs release for work  Can JE clear?  ----- Message -----  From: Laureen Jacobsen  Sent: 2/12/2018   4:22 PM  To: Inez Gustafson R.N.  Subject: Patient needs release for work                   TT/Inez    Patient needs a release for work with his restrictions and he wants a call back at [phone].

## 2018-02-13 NOTE — TELEPHONE ENCOUNTER
Patient needs release for work   Received: Yesterday   Message Contents   CECIL Yeboah/Inez     Patient needs a release for work with his restrictions and he wants a call back at [phone].      Returned patient call. No answer. LVM with contact information.

## 2018-02-13 NOTE — TELEPHONE ENCOUNTER
It has been 12 weeks since device, there are no device restrictions, pt needs to be cleared from heart failure standpoint, to  To who last saw pt to clear.

## 2018-02-21 ENCOUNTER — TELEPHONE (OUTPATIENT)
Dept: CARDIOLOGY | Facility: MEDICAL CENTER | Age: 48
End: 2018-02-21

## 2018-02-22 ENCOUNTER — OFFICE VISIT (OUTPATIENT)
Dept: CARDIOLOGY | Facility: MEDICAL CENTER | Age: 48
End: 2018-02-22
Payer: COMMERCIAL

## 2018-02-22 VITALS
HEART RATE: 90 BPM | WEIGHT: 268 LBS | SYSTOLIC BLOOD PRESSURE: 86 MMHG | OXYGEN SATURATION: 98 % | HEIGHT: 73 IN | DIASTOLIC BLOOD PRESSURE: 52 MMHG | BODY MASS INDEX: 35.52 KG/M2

## 2018-02-22 DIAGNOSIS — Z95.810 ICD (IMPLANTABLE CARDIOVERTER-DEFIBRILLATOR) IN PLACE: ICD-10-CM

## 2018-02-22 DIAGNOSIS — I51.89 LEFT VENTRICULAR SYSTOLIC DYSFUNCTION, NYHA CLASS 1: ICD-10-CM

## 2018-02-22 DIAGNOSIS — I10 HTN (HYPERTENSION), MALIGNANT: ICD-10-CM

## 2018-02-22 DIAGNOSIS — I47.20 VT (VENTRICULAR TACHYCARDIA) (HCC): ICD-10-CM

## 2018-02-22 DIAGNOSIS — I42.8 NON-ISCHEMIC CARDIOMYOPATHY (HCC): ICD-10-CM

## 2018-02-22 DIAGNOSIS — I50.20 ACC/AHA STAGE C SYSTOLIC HEART FAILURE (HCC): ICD-10-CM

## 2018-02-22 PROCEDURE — 99214 OFFICE O/P EST MOD 30 MIN: CPT | Performed by: INTERNAL MEDICINE

## 2018-02-22 ASSESSMENT — ENCOUNTER SYMPTOMS
PALPITATIONS: 0
BLURRED VISION: 0
CHILLS: 0
ABDOMINAL PAIN: 0
VOMITING: 0
WEIGHT LOSS: 0
ORTHOPNEA: 0
DEPRESSION: 0
SHORTNESS OF BREATH: 0
BRUISES/BLEEDS EASILY: 0
DIZZINESS: 0
NAUSEA: 0
EYE DISCHARGE: 0
PND: 0
HEADACHES: 0
LOSS OF CONSCIOUSNESS: 0
FEVER: 0
CLAUDICATION: 0
SENSORY CHANGE: 0
SPEECH CHANGE: 0
FALLS: 0
MYALGIAS: 0
COUGH: 0
BLOOD IN STOOL: 0
EYE PAIN: 0
HALLUCINATIONS: 0
DOUBLE VISION: 0

## 2018-02-23 NOTE — PROGRESS NOTES
Subjective:   Richard Franklin is a 47 y.o. male who presents today For cardiac care and evaluation and management of congestive heart failure. Patient does have a history of cardiomyopathy for many years. It is thought to be related to untreated hypertension. Recently in November 2017, patient was in the hospital because of ventricular tachycardia. Patient had an invasive coronary angiogram at that time which showed nonobstructive CAD with 60% mid LAD lesion without significant hemodynamic evaluated by FFR. Patient underwent biventricular ICD placement for secondary prevention. Currently, he is only paced 80% of the time due to ectopies.     Patient is feeling very well. No dyspnea. He is very active. Able to walk up 2 flights of stairs without problems.     Chief Complaint: cardiomyopathy management.    Past Medical History:   Diagnosis Date   • Chronic combined systolic and diastolic heart failure (CMS-HCC)    • Dilated cardiomyopathy (CMS-HCC)     rEFof 30-35%   • DM (diabetes mellitus) (CMS-HCC)    • History of obesity    • Hyperlipoproteinemia    • Hypertension    • Hypertriglyceridemia    • Mixed hyperlipidemia    • Palpitations    • PVC (premature ventricular contraction)    • Sleep apnea    • Ventricular tachycardia (CMS-HCC)      Past Surgical History:   Procedure Laterality Date   • AICD IMPLANT     • CARDIAC CATH     • OTHER      THROAT SURGERY: Uvulopalatopharyngoplasty.     Family History   Problem Relation Age of Onset   • Hypertension Mother      History   Smoking Status   • Never Smoker   Smokeless Tobacco   • Former User     No Known Allergies  Outpatient Encounter Prescriptions as of 2/22/2018   Medication Sig Dispense Refill   • Non Formulary Request      • hydrocodone-acetaminophen (NORCO) 5-325 MG Tab per tablet Take 1-2 Tabs by mouth every four hours as needed.     • celecoxib (CELEBREX) 200 MG Cap Take 200 mg by mouth 2 times a day.     • lisinopril (PRINIVIL) 20 MG Tab Take 1 Tab by mouth  "every day. 90 Tab 3   • spironolactone (ALDACTONE) 25 MG Tab Take 1 Tab by mouth every day. 30 Tab 11   • carvedilol (COREG) 25 MG Tab Take 1 Tab by mouth 2 Times a Day. 60 Tab 11   • SitaGLIPtin-MetFORMIN HCl (JANUMET XR)  MG TABLET SR 24 HR Take 2 Tabs by mouth every day.     • Icosapent Ethyl (VASCEPA) 1 g Cap Take 2 Caps by mouth 2 Times a Day.     • insulin aspart (NOVOLOG) 100 UNIT/ML Solution Inject 30-50 Units as instructed 3 times a day before meals. 30 AM  50 AFTERNOON  30 PM       No facility-administered encounter medications on file as of 2/22/2018.      Review of Systems   Constitutional: Negative for chills, fever, malaise/fatigue and weight loss.   HENT: Negative for ear discharge, ear pain, hearing loss and nosebleeds.    Eyes: Negative for blurred vision, double vision, pain and discharge.   Respiratory: Negative for cough and shortness of breath.    Cardiovascular: Negative for chest pain, palpitations, orthopnea, claudication, leg swelling and PND.   Gastrointestinal: Negative for abdominal pain, blood in stool, melena, nausea and vomiting.   Genitourinary: Negative for dysuria and hematuria.   Musculoskeletal: Negative for falls, joint pain and myalgias.   Skin: Negative for itching and rash.   Neurological: Negative for dizziness, sensory change, speech change, loss of consciousness and headaches.   Endo/Heme/Allergies: Negative for environmental allergies. Does not bruise/bleed easily.   Psychiatric/Behavioral: Negative for depression, hallucinations and suicidal ideas.        Objective:   BP (!) 86/52   Pulse 90   Ht 1.854 m (6' 1\")   Wt 121.6 kg (268 lb)   SpO2 98%   BMI 35.36 kg/m²     Physical Exam   Constitutional: He is oriented to person, place, and time. He appears well-developed and well-nourished.   HENT:   Head: Normocephalic and atraumatic.   Eyes: EOM are normal.   Neck: Normal range of motion. No JVD present.   Cardiovascular: Normal rate, regular rhythm, normal heart " sounds and intact distal pulses.  Exam reveals no gallop and no friction rub.    No murmur heard.  Bilateral femoral pulses are 2+, bilateral dorsalis pedis pulses are 2+, bilateral posterior tibialis pulses are 2+.   Pulmonary/Chest: No respiratory distress. He has no wheezes. He has no rales. He exhibits no tenderness.   Abdominal: Soft. Bowel sounds are normal. There is no tenderness. There is no rebound and no guarding.   The is no presence of abdominal bruits   Musculoskeletal: Normal range of motion. He exhibits no edema or tenderness.   Neurological: He is alert and oriented to person, place, and time.   Skin: Skin is warm and dry.   Psychiatric: He has a normal mood and affect.   Nursing note and vitals reviewed.      Assessment:     1. ACC/AHA stage C systolic heart failure (CMS-HCC)     2. Left ventricular systolic dysfunction, NYHA class 1     3. Non-ischemic cardiomyopathy (CMS-[facility])     4. HTN (hypertension), malignant     5. VT (ventricular tachycardia) (CMS-[facility])     6. ICD (implantable cardioverter-defibrillator) in place         Medical Decision Making:  Today's Assessment / Status / Plan:   Today, based on physical examination findings, patient is euvolemic. No JVD, lungs are clear to auscultation, no pitting edema in bilateral lower extremities, no ascites.     Dry weight is 268 lbs.     Patient is NYHA class 1, not candidate for Entresto therapy at this time.     Continue Lisinopril to 20 mg po daily.  Will continue Coreg 25 mg po bid, Spironolactone 25 mg po daily.     Will continue to closely monitor for side effects of patient's high risk medication(s) including liver, renal function and electrolytes.     I will see patient back in our Heart Failure Clinic with lab tests and studies results in 12 weeks.     I thank you Dr. Mares for referring patient to our Heart Failure Clinic today.

## 2018-04-10 ENCOUNTER — NON-PROVIDER VISIT (OUTPATIENT)
Dept: CARDIOLOGY | Facility: MEDICAL CENTER | Age: 48
End: 2018-04-10
Payer: COMMERCIAL

## 2018-04-10 ENCOUNTER — HOSPITAL ENCOUNTER (OUTPATIENT)
Dept: CARDIOLOGY | Facility: MEDICAL CENTER | Age: 48
End: 2018-04-10
Attending: INTERNAL MEDICINE
Payer: COMMERCIAL

## 2018-04-10 DIAGNOSIS — I49.3 PVC (PREMATURE VENTRICULAR CONTRACTION): ICD-10-CM

## 2018-04-10 DIAGNOSIS — I47.20 V-TACH (HCC): ICD-10-CM

## 2018-04-10 LAB
LV EJECT FRACT  99904: 35
LV EJECT FRACT MOD 2C 99903: 33.68
LV EJECT FRACT MOD 4C 99902: 27.6
LV EJECT FRACT MOD BP 99901: 27.91

## 2018-04-10 PROCEDURE — 93306 TTE W/DOPPLER COMPLETE: CPT

## 2018-04-10 PROCEDURE — 93284 PRGRMG EVAL IMPLANTABLE DFB: CPT | Performed by: INTERNAL MEDICINE

## 2018-04-10 PROCEDURE — 93306 TTE W/DOPPLER COMPLETE: CPT | Mod: 26 | Performed by: INTERNAL MEDICINE

## 2018-04-23 ENCOUNTER — OFFICE VISIT (OUTPATIENT)
Dept: CARDIOLOGY | Facility: MEDICAL CENTER | Age: 48
End: 2018-04-23
Payer: COMMERCIAL

## 2018-04-23 VITALS
HEART RATE: 84 BPM | BODY MASS INDEX: 36.58 KG/M2 | WEIGHT: 276 LBS | DIASTOLIC BLOOD PRESSURE: 76 MMHG | SYSTOLIC BLOOD PRESSURE: 112 MMHG | OXYGEN SATURATION: 97 % | HEIGHT: 73 IN

## 2018-04-23 DIAGNOSIS — I50.20 ACC/AHA STAGE C SYSTOLIC HEART FAILURE (HCC): ICD-10-CM

## 2018-04-23 DIAGNOSIS — I44.7 LBBB (LEFT BUNDLE BRANCH BLOCK): ICD-10-CM

## 2018-04-23 DIAGNOSIS — N52.2 DRUG-INDUCED ERECTILE DYSFUNCTION: ICD-10-CM

## 2018-04-23 DIAGNOSIS — I47.20 VT (VENTRICULAR TACHYCARDIA) (HCC): ICD-10-CM

## 2018-04-23 DIAGNOSIS — I42.8 NON-ISCHEMIC CARDIOMYOPATHY (HCC): ICD-10-CM

## 2018-04-23 DIAGNOSIS — I50.22 CHRONIC SYSTOLIC HEART FAILURE (HCC): ICD-10-CM

## 2018-04-23 DIAGNOSIS — R06.09 DYSPNEA ON EXERTION: ICD-10-CM

## 2018-04-23 DIAGNOSIS — I10 HTN (HYPERTENSION), MALIGNANT: ICD-10-CM

## 2018-04-23 DIAGNOSIS — Z95.810 ICD (IMPLANTABLE CARDIOVERTER-DEFIBRILLATOR) IN PLACE: ICD-10-CM

## 2018-04-23 DIAGNOSIS — Z79.899 HIGH RISK MEDICATION USE: ICD-10-CM

## 2018-04-23 DIAGNOSIS — I51.89 LEFT VENTRICULAR SYSTOLIC DYSFUNCTION, NYHA CLASS 1: ICD-10-CM

## 2018-04-23 PROCEDURE — 94618 PULMONARY STRESS TESTING: CPT | Performed by: INTERNAL MEDICINE

## 2018-04-23 PROCEDURE — 99214 OFFICE O/P EST MOD 30 MIN: CPT | Mod: 25 | Performed by: INTERNAL MEDICINE

## 2018-04-23 RX ORDER — CARVEDILOL 25 MG/1
25 TABLET ORAL 2 TIMES DAILY
Qty: 60 TAB | Refills: 11 | Status: SHIPPED | OUTPATIENT
Start: 2018-04-23 | End: 2019-04-15 | Stop reason: SDUPTHER

## 2018-04-23 RX ORDER — SPIRONOLACTONE 25 MG/1
25 TABLET ORAL DAILY
Qty: 30 TAB | Refills: 11 | Status: SHIPPED | OUTPATIENT
Start: 2018-04-23 | End: 2019-04-15 | Stop reason: SDUPTHER

## 2018-04-23 RX ORDER — LISINOPRIL 20 MG/1
20 TABLET ORAL DAILY
Qty: 90 TAB | Refills: 3 | Status: SHIPPED | OUTPATIENT
Start: 2018-04-23 | End: 2018-10-23

## 2018-04-23 RX ORDER — SILDENAFIL 100 MG/1
100 TABLET, FILM COATED ORAL PRN
Qty: 3 TAB | Refills: 3 | Status: SHIPPED
Start: 2018-04-23 | End: 2020-05-11

## 2018-04-23 ASSESSMENT — MINNESOTA LIVING WITH HEART FAILURE QUESTIONNAIRE (MLHF)
EATING LESS FOODS YOU LIKE: 0
WORKING AROUND THE HOUSE OR YARD DIFFICULT: 0
DIFFICULTY GOING AWAY FROM HOME: 0
MAKING YOU SHORT OF BREATH: 0
FEELING LIKE A BURDEN TO FAMILY AND FRIENDS: 1
DIFFICULTY WORKING TO EARN A LIVING: 0
DIFFICULTY WITH RECREATIONAL PASTIMES, SPORTS, HOBBIES: 1
MAKING YOU STAY IN A HOSPITAL: 0
LOSS OF SELF CONTROL IN YOUR LIFE: 0
DIFFICULTY SLEEPING WELL AT NIGHT: 0
DIFFICULTY WITH SEXUAL ACTIVITIES: 2
TOTAL_SCORE: 9
TIRED, FATIGUED OR LOW ON ENERGY: 1
DIFFICULTY TO CONCENTRATE OR REMEMBERING THINGS: 0
SWELLING IN ANKLES OR LEGS: 0
HAVING TO SIT OR LIE DOWN DURING THE DAY: 0
WALKING ABOUT OR CLIMBING STAIRS DIFFICULT: 0
MAKING YOU WORRY: 2
GIVING YOU SIDE EFFECTS FROM TREATMENTS: 0
MAKING YOU FEEL DEPRESSED: 0
COSTING YOU MONEY FOR MEDICAL CARE: 2
DIFFICULTY SOCIALIZING WITH FAMILY OR FRIENDS: 0

## 2018-04-23 ASSESSMENT — ENCOUNTER SYMPTOMS
CLAUDICATION: 0
HALLUCINATIONS: 0
COUGH: 0
EYE DISCHARGE: 0
DIZZINESS: 0
ORTHOPNEA: 0
NAUSEA: 0
VOMITING: 0
FEVER: 0
BLOOD IN STOOL: 0
SPEECH CHANGE: 0
LOSS OF CONSCIOUSNESS: 0
CHILLS: 0
EYE PAIN: 0
DOUBLE VISION: 0
WEIGHT LOSS: 0
PND: 0
ABDOMINAL PAIN: 0
FALLS: 0
MYALGIAS: 0
PALPITATIONS: 0
DEPRESSION: 0
HEADACHES: 0
BLURRED VISION: 0
SENSORY CHANGE: 0
BRUISES/BLEEDS EASILY: 0
SHORTNESS OF BREATH: 0

## 2018-04-23 ASSESSMENT — NEW YORK HEART ASSOCIATION (NYHA) CLASSIFICATION: NYHA FUNCTIONAL CLASS: CLASS L

## 2018-04-23 ASSESSMENT — 6 MINUTE WALK TEST (6MWT): TOTAL DISTANCE WALKED (METERS): 408

## 2018-04-23 NOTE — LETTER
[facility]   [address], [facility] [address]  [place] [address]  Phone: [phone]  Fax: [phone]              Richard Franklin  1970    Encounter Date: 4/23/2018    Jessica Romano M.D.          PROGRESS NOTE:  Chief Complaint   Patient presents with   • CHF (Systolic)     F/V: 2 MO       Subjective:   Richard Franklin is a 48 y.o. male who presents today For cardiac care and evaluation and management of congestive heart failure. Patient does have a history of cardiomyopathy for many years. It is thought to be related to untreated hypertension. Recently in November 2017, patient was in the hospital because of ventricular tachycardia. Patient had an invasive coronary angiogram at that time which showed nonobstructive CAD with 60% mid LAD lesion without significant hemodynamic evaluated by FFR. Patient underwent biventricular ICD placement for secondary prevention. Currently, he is only paced 80% of the time due to ectopies.     Patient is feeling very well. No dyspnea. He is very active. Able to walk up 2 flights of stairs without problems.     Chief Complaint: cardiomyopathy management.    Past Medical History:   Diagnosis Date   • Chronic combined systolic and diastolic heart failure (CMS-HCC)    • Dilated cardiomyopathy (CMS-HCC)     rEFof 30-35%   • DM (diabetes mellitus) (CMS-HCC)    • History of obesity    • Hyperlipoproteinemia    • Hypertension    • Hypertriglyceridemia    • Mixed hyperlipidemia    • Palpitations    • PVC (premature ventricular contraction)    • Sleep apnea    • Ventricular tachycardia (CMS-HCC)      Past Surgical History:   Procedure Laterality Date   • AICD IMPLANT     • CARDIAC CATH     • OTHER      THROAT SURGERY: Uvulopalatopharyngoplasty.     Family History   Problem Relation Age of Onset   • Hypertension Mother      Social History     Social History   • Marital status:      Spouse name: N/A   • Number of children: N/A   •  Years of education: N/A     Occupational History   • Not on file.     Social History Main Topics   • Smoking status: Never Smoker   • Smokeless tobacco: Former User   • Alcohol use Yes      Comment: ocassionally   • Drug use: No   • Sexual activity: Not on file     Other Topics Concern   • Not on file     Social History Narrative   • No narrative on file     No Known Allergies  Outpatient Encounter Prescriptions as of 4/23/2018   Medication Sig Dispense Refill   • Non Formulary Request      • hydrocodone-acetaminophen (NORCO) 5-325 MG Tab per tablet Take 1-2 Tabs by mouth every four hours as needed.     • celecoxib (CELEBREX) 200 MG Cap Take 200 mg by mouth 2 times a day.     • lisinopril (PRINIVIL) 20 MG Tab Take 1 Tab by mouth every day. 90 Tab 3   • spironolactone (ALDACTONE) 25 MG Tab Take 1 Tab by mouth every day. 30 Tab 11   • carvedilol (COREG) 25 MG Tab Take 1 Tab by mouth 2 Times a Day. 60 Tab 11   • SitaGLIPtin-MetFORMIN HCl (JANUMET XR)  MG TABLET SR 24 HR Take 2 Tabs by mouth every day.     • Icosapent Ethyl (VASCEPA) 1 g Cap Take 2 Caps by mouth 2 Times a Day.     • insulin aspart (NOVOLOG) 100 UNIT/ML Solution Inject 30-50 Units as instructed 3 times a day before meals. 30 AM  50 AFTERNOON  30 PM       No facility-administered encounter medications on file as of 4/23/2018.      Review of Systems   Constitutional: Negative for chills, fever, malaise/fatigue and weight loss.   HENT: Negative for ear discharge, ear pain, hearing loss and nosebleeds.    Eyes: Negative for blurred vision, double vision, pain and discharge.   Respiratory: Negative for cough and shortness of breath.    Cardiovascular: Negative for chest pain, palpitations, orthopnea, claudication, leg swelling and PND.   Gastrointestinal: Negative for abdominal pain, blood in stool, melena, nausea and vomiting.   Genitourinary: Negative for dysuria and hematuria.   Musculoskeletal: Negative for falls, joint pain and myalgias.   Skin:  "Negative for itching and rash.   Neurological: Negative for dizziness, sensory change, speech change, loss of consciousness and headaches.   Endo/Heme/Allergies: Negative for environmental allergies. Does not bruise/bleed easily.   Psychiatric/Behavioral: Negative for depression, hallucinations and suicidal ideas.        Objective:   /76   Pulse 84   Ht 1.854 m (6' 1\")   Wt (!) 125.2 kg (276 lb)   SpO2 97%   BMI 36.41 kg/m²      Physical Exam   Constitutional: He is oriented to person, place, and time. No distress.   HENT:   Head: Normocephalic and atraumatic.   Right Ear: External ear normal.   Left Ear: External ear normal.   Eyes: Right eye exhibits no discharge. Left eye exhibits no discharge.   Neck: No JVD present. No thyromegaly present.   Cardiovascular: Normal rate, regular rhythm, normal heart sounds and intact distal pulses.  Exam reveals no gallop and no friction rub.    No murmur heard.  Pulmonary/Chest: Breath sounds normal. No respiratory distress.   Abdominal: Bowel sounds are normal. He exhibits no distension. There is no tenderness.   Musculoskeletal: He exhibits no edema or tenderness.   Neurological: He is alert and oriented to person, place, and time. No cranial nerve deficit.   Skin: Skin is warm and dry. He is not diaphoretic.   Psychiatric: He has a normal mood and affect. His behavior is normal.   Nursing note and vitals reviewed.      Assessment:     1. ACC/AHA stage C systolic heart failure (CMS-HCC)     2. Left ventricular systolic dysfunction, NYHA class 1     3. Non-ischemic cardiomyopathy (CMS-HCC)     4. VT (ventricular tachycardia) (CMS-HCC)     5. ICD (implantable cardioverter-defibrillator) in place     6. HTN (hypertension), malignant     7. LBBB (left bundle branch block)     8. Chronic systolic heart failure (CMS-HCC)     9. Dyspnea on exertion         Medical Decision Making:  Today's Assessment / Status / Plan:            Ravi Mares M.D.  [address]  [place] [id number]  VIA Facsimile: [phone]

## 2018-04-23 NOTE — PROGRESS NOTES
Chief Complaint   Patient presents with   • CHF (Systolic)     F/V: 2 MO       Subjective:   Richard Franklin is a 48 y.o. male who presents today For cardiac care and evaluation and management of congestive heart failure. Patient does have a history of cardiomyopathy for many years. It is thought to be related to untreated hypertension. Recently in November 2017, patient was in the hospital because of ventricular tachycardia. Patient had an invasive coronary angiogram at that time which showed nonobstructive CAD with 60% mid LAD lesion without significant hemodynamic evaluated by FFR. Patient underwent biventricular ICD placement for secondary prevention. Currently, he is only paced 80% of the time due to ectopies.     Patient is feeling very well. No dyspnea. He is very active. Able to walk up 2 flights of stairs without problems.    LVEF is still 35%.    Patient was able to complete 408 m during his 6 minute walk test. his O2 saturation at baseline was 98% and at the end of the test, the O2 saturation was 97%. he reported 0.5 level of dyspnea on Francesco scale.    +ED.     Chief Complaint: cardiomyopathy management.    Past Medical History:   Diagnosis Date   • Chronic combined systolic and diastolic heart failure (CMS-HCC)    • Dilated cardiomyopathy (CMS-HCC)     rEFof 30-35%   • DM (diabetes mellitus) (CMS-HCC)    • History of obesity    • Hyperlipoproteinemia    • Hypertension    • Hypertriglyceridemia    • Mixed hyperlipidemia    • Palpitations    • PVC (premature ventricular contraction)    • Sleep apnea    • Ventricular tachycardia (CMS-HCC)      Past Surgical History:   Procedure Laterality Date   • AICD IMPLANT     • CARDIAC CATH     • OTHER      THROAT SURGERY: Uvulopalatopharyngoplasty.     Family History   Problem Relation Age of Onset   • Hypertension Mother      Social History     Social History   • Marital status:      Spouse name: N/A   • Number of children: N/A   • Years of education: N/A      Occupational History   • Not on file.     Social History Main Topics   • Smoking status: Never Smoker   • Smokeless tobacco: Former User   • Alcohol use Yes      Comment: ocassionally   • Drug use: No   • Sexual activity: Not on file     Other Topics Concern   • Not on file     Social History Narrative   • No narrative on file     No Known Allergies  Outpatient Encounter Prescriptions as of 4/23/2018   Medication Sig Dispense Refill   • carvedilol (COREG) 25 MG Tab Take 1 Tab by mouth 2 Times a Day. 60 Tab 11   • lisinopril (PRINIVIL) 20 MG Tab Take 1 Tab by mouth every day. 90 Tab 3   • spironolactone (ALDACTONE) 25 MG Tab Take 1 Tab by mouth every day. 30 Tab 11   • sildenafil citrate (VIAGRA) 100 MG tablet Take 1 Tab by mouth as needed for Erectile Dysfunction. 3 Tab 3   • Non Formulary Request      • hydrocodone-acetaminophen (NORCO) 5-325 MG Tab per tablet Take 1-2 Tabs by mouth every four hours as needed.     • celecoxib (CELEBREX) 200 MG Cap Take 200 mg by mouth 2 times a day.     • SitaGLIPtin-MetFORMIN HCl (JANUMET XR)  MG TABLET SR 24 HR Take 2 Tabs by mouth every day.     • Icosapent Ethyl (VASCEPA) 1 g Cap Take 2 Caps by mouth 2 Times a Day.     • insulin aspart (NOVOLOG) 100 UNIT/ML Solution Inject 30-50 Units as instructed 3 times a day before meals. 30 AM  50 AFTERNOON  30 PM     • [DISCONTINUED] lisinopril (PRINIVIL) 20 MG Tab Take 1 Tab by mouth every day. 90 Tab 3   • [DISCONTINUED] spironolactone (ALDACTONE) 25 MG Tab Take 1 Tab by mouth every day. 30 Tab 11   • [DISCONTINUED] carvedilol (COREG) 25 MG Tab Take 1 Tab by mouth 2 Times a Day. 60 Tab 11     No facility-administered encounter medications on file as of 4/23/2018.      Review of Systems   Constitutional: Negative for chills, fever, malaise/fatigue and weight loss.   HENT: Negative for ear discharge, ear pain, hearing loss and nosebleeds.    Eyes: Negative for blurred vision, double vision, pain and discharge.   Respiratory:  "Negative for cough and shortness of breath.    Cardiovascular: Negative for chest pain, palpitations, orthopnea, claudication, leg swelling and PND.   Gastrointestinal: Negative for abdominal pain, blood in stool, melena, nausea and vomiting.   Genitourinary: Negative for dysuria and hematuria.   Musculoskeletal: Negative for falls, joint pain and myalgias.   Skin: Negative for itching and rash.   Neurological: Negative for dizziness, sensory change, speech change, loss of consciousness and headaches.   Endo/Heme/Allergies: Negative for environmental allergies. Does not bruise/bleed easily.   Psychiatric/Behavioral: Negative for depression, hallucinations and suicidal ideas.        Objective:   /76   Pulse 84   Ht 1.854 m (6' 1\")   Wt (!) 125.2 kg (276 lb)   SpO2 97%   BMI 36.41 kg/m²     Physical Exam   Constitutional: He is oriented to person, place, and time. No distress.   HENT:   Head: Normocephalic and atraumatic.   Right Ear: External ear normal.   Left Ear: External ear normal.   Eyes: Right eye exhibits no discharge. Left eye exhibits no discharge.   Neck: No JVD present. No thyromegaly present.   Cardiovascular: Normal rate, regular rhythm, normal heart sounds and intact distal pulses.  Exam reveals no gallop and no friction rub.    No murmur heard.  Pulmonary/Chest: Breath sounds normal. No respiratory distress.   Abdominal: Bowel sounds are normal. He exhibits no distension. There is no tenderness.   Musculoskeletal: He exhibits no edema or tenderness.   Neurological: He is alert and oriented to person, place, and time. No cranial nerve deficit.   Skin: Skin is warm and dry. He is not diaphoretic.   Psychiatric: He has a normal mood and affect. His behavior is normal.   Nursing note and vitals reviewed.      Assessment:     1. ACC/AHA stage C systolic heart failure (CMS-HCC)  lisinopril (PRINIVIL) 20 MG Tab    MO PULMONARY STRESS TESTING 6 MIN WALK    COMP METABOLIC PANEL    LIPID PANEL   2. " Left ventricular systolic dysfunction, NYHA class 1  lisinopril (PRINIVIL) 20 MG Tab    KS PULMONARY STRESS TESTING 6 MIN WALK    COMP METABOLIC PANEL    LIPID PANEL   3. Non-ischemic cardiomyopathy (CMS-HCC)  lisinopril (PRINIVIL) 20 MG Tab    COMP METABOLIC PANEL    LIPID PANEL   4. VT (ventricular tachycardia) (CMS-HCC)     5. ICD (implantable cardioverter-defibrillator) in place     6. HTN (hypertension), malignant  lisinopril (PRINIVIL) 20 MG Tab   7. LBBB (left bundle branch block)  COMP METABOLIC PANEL    LIPID PANEL   8. Chronic systolic heart failure (CMS-HCC)     9. Dyspnea on exertion  KS PULMONARY STRESS TESTING 6 MIN WALK   10. Drug-induced erectile dysfunction  sildenafil citrate (VIAGRA) 100 MG tablet   11. High risk medication use         Medical Decision Making:  Today's Assessment / Status / Plan:   Today, based on physical examination findings, patient is euvolemic. No JVD, lungs are clear to auscultation, no pitting edema in bilateral lower extremities, no ascites.    Dry weight is 276 lbs.    Cont current medications at current dose.     Already has ICD.    Will continue to closely monitor for side effects of patient's high risk medication(s) including liver, renal function and electrolytes.    I will see patient back in our Heart Failure Clinic with lab tests and studies results in 6 months.    I thank you for referring patient to our Heart Failure Clinic today.

## 2018-04-24 NOTE — PROGRESS NOTES
"Reevaluation of 6MWT/MLWHF Questionnaire    Date: 2018  6MWT: 445 meters  MLWHF: 6    Date: 2018  6MWT: 408 meters  MLWHF: 9    OP Heart Failure  Vitals  Appointment Type: Heart Failure Established  Weight: (!) 125.2 kg (276 lb)  How Weight Obtained: Stand Up Scale  Height: 185.4 cm (6' 1\")  BMI (Calculated): 36.41  Blood Pressure: 112/76  Pulse: 84    System Assessment  NYHA Functional Class Assessment: Class l  ACC/AHA HF Stage: C    Smoking Hx  Have you Ever Smoked: Never    Alcohol Hx  Do you Drink?: No     Illicit Drug Hx  Illicit Drug History: No    [place] Living with Heart Failure  Swelling in Ankles or Legs: 0  Having to Sit or Lie Down During the Day: 0  Walking About or Climbing Stairs Difficult: 0  Working Around the House or Yard Difficult: 0  Difficulty Going Away from Home: 0  Difficulty Sleeping Well at Night: 0  Difficulty Socializing with Family or Friends: 0  Difficulty Working to Earn a Livin  Difficulty with Recreational Pastimes, Sports, Hobbies: 1  Difficulty with Sexual Activities: 2  Eating Less Foods You Like: 0  Making you Short of Breath: 0  Tired, Fatigued or Low on Energy: 1  Making you Stay in a Hospital: 0  Costing you Money for Medical Care?: 2  Giving you Side Effects from Treatments: 0  Feeling like a [place] to Family and Friends: 1  Loss of Self Control in your Life: 0  Making You Worry: 2  Difficulty to Concentrate or Remembering Things: 0  Making you Feel Depressed: 0  MLHF Total Score : 9    6 Minute Walk Test  Baseline to end of test: 6:00  Total meters walked: 408         AVTAR Sales RN  [phone]  "

## 2018-05-18 ENCOUNTER — HOSPITAL ENCOUNTER (OUTPATIENT)
Dept: LAB | Facility: MEDICAL CENTER | Age: 48
End: 2018-05-18
Attending: PHYSICIAN ASSISTANT
Payer: COMMERCIAL

## 2018-05-18 LAB
ALBUMIN SERPL BCP-MCNC: 4.2 G/DL (ref 3.2–4.9)
ALBUMIN/GLOB SERPL: 1.8 G/DL
ALP SERPL-CCNC: 42 U/L (ref 30–99)
ALT SERPL-CCNC: 20 U/L (ref 2–50)
ANION GAP SERPL CALC-SCNC: 10 MMOL/L (ref 0–11.9)
AST SERPL-CCNC: 15 U/L (ref 12–45)
BILIRUB SERPL-MCNC: 0.5 MG/DL (ref 0.1–1.5)
BUN SERPL-MCNC: 17 MG/DL (ref 8–22)
CALCIUM SERPL-MCNC: 8.9 MG/DL (ref 8.5–10.5)
CHLORIDE SERPL-SCNC: 105 MMOL/L (ref 96–112)
CHOLEST SERPL-MCNC: 173 MG/DL (ref 100–199)
CO2 SERPL-SCNC: 24 MMOL/L (ref 20–33)
CREAT SERPL-MCNC: 0.88 MG/DL (ref 0.5–1.4)
EST. AVERAGE GLUCOSE BLD GHB EST-MCNC: 226 MG/DL
GLOBULIN SER CALC-MCNC: 2.3 G/DL (ref 1.9–3.5)
GLUCOSE SERPL-MCNC: 235 MG/DL (ref 65–99)
HBA1C MFR BLD: 9.5 % (ref 0–5.6)
HDLC SERPL-MCNC: 27 MG/DL
LDLC SERPL CALC-MCNC: ABNORMAL MG/DL
POTASSIUM SERPL-SCNC: 3.8 MMOL/L (ref 3.6–5.5)
PROT SERPL-MCNC: 6.5 G/DL (ref 6–8.2)
SODIUM SERPL-SCNC: 139 MMOL/L (ref 135–145)
TRIGL SERPL-MCNC: 465 MG/DL (ref 0–149)

## 2018-05-18 PROCEDURE — 83036 HEMOGLOBIN GLYCOSYLATED A1C: CPT

## 2018-05-18 PROCEDURE — 80061 LIPID PANEL: CPT

## 2018-05-18 PROCEDURE — 80053 COMPREHEN METABOLIC PANEL: CPT

## 2018-05-18 PROCEDURE — 36415 COLL VENOUS BLD VENIPUNCTURE: CPT

## 2018-06-04 ENCOUNTER — TELEPHONE (OUTPATIENT)
Dept: CARDIOLOGY | Facility: MEDICAL CENTER | Age: 48
End: 2018-06-04

## 2018-06-04 ENCOUNTER — HOSPITAL ENCOUNTER (OUTPATIENT)
Dept: HOSPITAL 8 - STAR | Age: 48
Discharge: HOME | End: 2018-06-04
Attending: ORTHOPAEDIC SURGERY
Payer: COMMERCIAL

## 2018-06-04 VITALS — DIASTOLIC BLOOD PRESSURE: 87 MMHG | SYSTOLIC BLOOD PRESSURE: 136 MMHG

## 2018-06-04 DIAGNOSIS — E11.9: ICD-10-CM

## 2018-06-04 DIAGNOSIS — M25.852: ICD-10-CM

## 2018-06-04 DIAGNOSIS — Z01.818: Primary | ICD-10-CM

## 2018-06-04 DIAGNOSIS — I51.7: ICD-10-CM

## 2018-06-04 DIAGNOSIS — Z95.0: ICD-10-CM

## 2018-06-04 LAB
ALBUMIN SERPL-MCNC: 3.8 G/DL (ref 3.4–5)
ALP SERPL-CCNC: 48 U/L (ref 45–117)
ALT SERPL-CCNC: 29 U/L (ref 12–78)
ANION GAP SERPL CALC-SCNC: 11 MMOL/L (ref 5–15)
APTT BLD: 25 SECONDS (ref 25–31)
BASOPHILS # BLD AUTO: 0.03 X10^3/UL (ref 0–0.1)
BASOPHILS NFR BLD AUTO: 0 % (ref 0–1)
BILIRUB SERPL-MCNC: 0.7 MG/DL (ref 0.2–1)
CALCIUM SERPL-MCNC: 8.4 MG/DL (ref 8.5–10.1)
CHLORIDE SERPL-SCNC: 109 MMOL/L (ref 98–107)
CREAT SERPL-MCNC: 1.36 MG/DL (ref 0.7–1.3)
CULTURE INDICATED?: NO
EOSINOPHIL # BLD AUTO: 0.16 X10^3/UL (ref 0–0.4)
EOSINOPHIL NFR BLD AUTO: 2 % (ref 1–7)
ERYTHROCYTE [DISTWIDTH] IN BLOOD BY AUTOMATED COUNT: 14.4 % (ref 9.4–14.8)
ERYTHROCYTE [SEDIMENTATION RATE] IN BLOOD BY WESTERGREN METHOD: 8 MM/HR (ref 0–10)
HCT (SEDRATE): 42.3 % (ref 39.2–51.8)
INR PPP: 1 (ref 0.93–1.1)
LYMPHOCYTES # BLD AUTO: 1.47 X10^3/UL (ref 1–3.4)
LYMPHOCYTES NFR BLD AUTO: 19 % (ref 22–44)
MCH RBC QN AUTO: 30.6 PG (ref 27.5–34.5)
MCHC RBC AUTO-ENTMCNC: 34.1 G/DL (ref 33.2–36.2)
MCV RBC AUTO: 89.7 FL (ref 81–97)
MD: NO
MICROSCOPIC: (no result)
MONOCYTES # BLD AUTO: 0.59 X10^3/UL (ref 0.2–0.8)
MONOCYTES NFR BLD AUTO: 8 % (ref 2–9)
NEUTROPHILS # BLD AUTO: 5.58 X10^3/UL (ref 1.8–6.8)
NEUTROPHILS NFR BLD AUTO: 71 % (ref 42–75)
PLATELET # BLD AUTO: 174 X10^3/UL (ref 130–400)
PMV BLD AUTO: 8.7 FL (ref 7.4–10.4)
PROT SERPL-MCNC: 7.1 G/DL (ref 6.4–8.2)
PROTHROMBIN TIME: 10.3 SECONDS (ref 9.6–11.5)
RBC # BLD AUTO: 4.79 X10^6/UL (ref 4.38–5.82)

## 2018-06-04 PROCEDURE — 87806 HIV AG W/HIV1&2 ANTB W/OPTIC: CPT

## 2018-06-04 PROCEDURE — 85610 PROTHROMBIN TIME: CPT

## 2018-06-04 PROCEDURE — 85651 RBC SED RATE NONAUTOMATED: CPT

## 2018-06-04 PROCEDURE — G0475 HIV COMBINATION ASSAY: HCPCS

## 2018-06-04 PROCEDURE — 85025 COMPLETE CBC W/AUTO DIFF WBC: CPT

## 2018-06-04 PROCEDURE — 71046 X-RAY EXAM CHEST 2 VIEWS: CPT

## 2018-06-04 PROCEDURE — 80053 COMPREHEN METABOLIC PANEL: CPT

## 2018-06-04 PROCEDURE — 80074 ACUTE HEPATITIS PANEL: CPT

## 2018-06-04 PROCEDURE — 93005 ELECTROCARDIOGRAM TRACING: CPT

## 2018-06-04 PROCEDURE — 36415 COLL VENOUS BLD VENIPUNCTURE: CPT

## 2018-06-04 PROCEDURE — 85730 THROMBOPLASTIN TIME PARTIAL: CPT

## 2018-06-04 PROCEDURE — 81003 URINALYSIS AUTO W/O SCOPE: CPT

## 2018-06-04 NOTE — TELEPHONE ENCOUNTER
clearance follow up   Received: Today   Message Contents   Nataliya Gustafson R.N.   Phone Number: [phone]             TT/[place]     Pt calling to ask if TT was able to review his paperwork for back surgery clearance, Richard dropped it off almost 2 wks ago.     Please call Richard this morning if possible, [phone]      Returned patient call. Pt informed that document was signed on 6/1 and is available for his pick-up or we can fax to Dr. Ulloa's office, whichever he prefer. Pt notified to call back and let us know his preference.

## 2018-06-15 ENCOUNTER — HOSPITAL ENCOUNTER (OUTPATIENT)
Dept: RADIOLOGY | Facility: MEDICAL CENTER | Age: 48
End: 2018-06-15
Attending: NURSE PRACTITIONER
Payer: COMMERCIAL

## 2018-06-15 ENCOUNTER — HOSPITAL ENCOUNTER (EMERGENCY)
Facility: MEDICAL CENTER | Age: 48
End: 2018-06-15
Attending: EMERGENCY MEDICINE
Payer: COMMERCIAL

## 2018-06-15 ENCOUNTER — OFFICE VISIT (OUTPATIENT)
Dept: URGENT CARE | Facility: PHYSICIAN GROUP | Age: 48
End: 2018-06-15
Payer: COMMERCIAL

## 2018-06-15 VITALS
WEIGHT: 272 LBS | HEART RATE: 85 BPM | SYSTOLIC BLOOD PRESSURE: 140 MMHG | TEMPERATURE: 97.9 F | HEIGHT: 73 IN | DIASTOLIC BLOOD PRESSURE: 70 MMHG | BODY MASS INDEX: 36.05 KG/M2 | RESPIRATION RATE: 16 BRPM | OXYGEN SATURATION: 98 %

## 2018-06-15 VITALS
HEIGHT: 76 IN | DIASTOLIC BLOOD PRESSURE: 76 MMHG | TEMPERATURE: 98.6 F | OXYGEN SATURATION: 94 % | BODY MASS INDEX: 34.39 KG/M2 | HEART RATE: 84 BPM | WEIGHT: 282.41 LBS | SYSTOLIC BLOOD PRESSURE: 125 MMHG | RESPIRATION RATE: 18 BRPM

## 2018-06-15 DIAGNOSIS — S92.345A CLOSED NONDISPLACED FRACTURE OF FOURTH METATARSAL BONE OF LEFT FOOT, INITIAL ENCOUNTER: ICD-10-CM

## 2018-06-15 DIAGNOSIS — L03.032 CELLULITIS OF LEFT TOE: ICD-10-CM

## 2018-06-15 DIAGNOSIS — L03.032 PARONYCHIA OF TOE OF LEFT FOOT: ICD-10-CM

## 2018-06-15 DIAGNOSIS — E11.9 DIABETES MELLITUS WITHOUT COMPLICATION (HCC): ICD-10-CM

## 2018-06-15 DIAGNOSIS — L03.114 CELLULITIS OF LEFT UPPER EXTREMITY: ICD-10-CM

## 2018-06-15 DIAGNOSIS — M79.89 SWELLING OF TOE OF LEFT FOOT: ICD-10-CM

## 2018-06-15 DIAGNOSIS — Z86.39 HISTORY OF DIABETES MELLITUS: ICD-10-CM

## 2018-06-15 LAB
ALBUMIN SERPL BCP-MCNC: 4.2 G/DL (ref 3.2–4.9)
ALBUMIN/GLOB SERPL: 1.4 G/DL
ALP SERPL-CCNC: 39 U/L (ref 30–99)
ALT SERPL-CCNC: 29 U/L (ref 2–50)
ANION GAP SERPL CALC-SCNC: 9 MMOL/L (ref 0–11.9)
AST SERPL-CCNC: 26 U/L (ref 12–45)
BASOPHILS # BLD AUTO: 0.6 % (ref 0–1.8)
BASOPHILS # BLD: 0.06 K/UL (ref 0–0.12)
BILIRUB SERPL-MCNC: 1 MG/DL (ref 0.1–1.5)
BUN SERPL-MCNC: 21 MG/DL (ref 8–22)
CALCIUM SERPL-MCNC: 9.1 MG/DL (ref 8.4–10.2)
CHLORIDE SERPL-SCNC: 109 MMOL/L (ref 96–112)
CO2 SERPL-SCNC: 22 MMOL/L (ref 20–33)
CREAT SERPL-MCNC: 1.08 MG/DL (ref 0.5–1.4)
CRP SERPL HS-MCNC: 1.38 MG/DL (ref 0–0.75)
EOSINOPHIL # BLD AUTO: 0.26 K/UL (ref 0–0.51)
EOSINOPHIL NFR BLD: 2.6 % (ref 0–6.9)
ERYTHROCYTE [DISTWIDTH] IN BLOOD BY AUTOMATED COUNT: 44.8 FL (ref 35.9–50)
GLOBULIN SER CALC-MCNC: 2.9 G/DL (ref 1.9–3.5)
GLUCOSE SERPL-MCNC: 89 MG/DL (ref 65–99)
HCT VFR BLD AUTO: 42.1 % (ref 42–52)
HGB BLD-MCNC: 14.4 G/DL (ref 14–18)
IMM GRANULOCYTES # BLD AUTO: 0.04 K/UL (ref 0–0.11)
IMM GRANULOCYTES NFR BLD AUTO: 0.4 % (ref 0–0.9)
LACTATE BLD-SCNC: 2 MMOL/L (ref 0.5–2)
LYMPHOCYTES # BLD AUTO: 2.33 K/UL (ref 1–4.8)
LYMPHOCYTES NFR BLD: 23.2 % (ref 22–41)
MCH RBC QN AUTO: 30 PG (ref 27–33)
MCHC RBC AUTO-ENTMCNC: 34.2 G/DL (ref 33.7–35.3)
MCV RBC AUTO: 87.7 FL (ref 81.4–97.8)
MONOCYTES # BLD AUTO: 1.11 K/UL (ref 0–0.85)
MONOCYTES NFR BLD AUTO: 11.1 % (ref 0–13.4)
NEUTROPHILS # BLD AUTO: 6.24 K/UL (ref 1.82–7.42)
NEUTROPHILS NFR BLD: 62.1 % (ref 44–72)
NRBC # BLD AUTO: 0 K/UL
NRBC BLD-RTO: 0 /100 WBC
PLATELET # BLD AUTO: 178 K/UL (ref 164–446)
PMV BLD AUTO: 9.8 FL (ref 9–12.9)
POTASSIUM SERPL-SCNC: 3.9 MMOL/L (ref 3.6–5.5)
PROT SERPL-MCNC: 7.1 G/DL (ref 6–8.2)
RBC # BLD AUTO: 4.8 M/UL (ref 4.7–6.1)
SODIUM SERPL-SCNC: 140 MMOL/L (ref 135–145)
WBC # BLD AUTO: 10 K/UL (ref 4.8–10.8)

## 2018-06-15 PROCEDURE — 80053 COMPREHEN METABOLIC PANEL: CPT

## 2018-06-15 PROCEDURE — 36415 COLL VENOUS BLD VENIPUNCTURE: CPT

## 2018-06-15 PROCEDURE — 86140 C-REACTIVE PROTEIN: CPT

## 2018-06-15 PROCEDURE — 87040 BLOOD CULTURE FOR BACTERIA: CPT | Mod: 91

## 2018-06-15 PROCEDURE — 99285 EMERGENCY DEPT VISIT HI MDM: CPT

## 2018-06-15 PROCEDURE — 700102 HCHG RX REV CODE 250 W/ 637 OVERRIDE(OP): Performed by: EMERGENCY MEDICINE

## 2018-06-15 PROCEDURE — 85025 COMPLETE CBC W/AUTO DIFF WBC: CPT

## 2018-06-15 PROCEDURE — 83605 ASSAY OF LACTIC ACID: CPT

## 2018-06-15 PROCEDURE — 73660 X-RAY EXAM OF TOE(S): CPT | Mod: LT

## 2018-06-15 PROCEDURE — 700111 HCHG RX REV CODE 636 W/ 250 OVERRIDE (IP): Performed by: EMERGENCY MEDICINE

## 2018-06-15 PROCEDURE — 700105 HCHG RX REV CODE 258: Performed by: EMERGENCY MEDICINE

## 2018-06-15 PROCEDURE — A9270 NON-COVERED ITEM OR SERVICE: HCPCS | Performed by: EMERGENCY MEDICINE

## 2018-06-15 PROCEDURE — 96365 THER/PROPH/DIAG IV INF INIT: CPT

## 2018-06-15 PROCEDURE — 99205 OFFICE O/P NEW HI 60 MIN: CPT | Performed by: NURSE PRACTITIONER

## 2018-06-15 RX ORDER — SULFAMETHOXAZOLE AND TRIMETHOPRIM 800; 160 MG/1; MG/1
1 TABLET ORAL ONCE
Status: COMPLETED | OUTPATIENT
Start: 2018-06-15 | End: 2018-06-15

## 2018-06-15 RX ORDER — SULFAMETHOXAZOLE AND TRIMETHOPRIM 800; 160 MG/1; MG/1
1 TABLET ORAL 2 TIMES DAILY
Qty: 14 TAB | Refills: 0 | Status: SHIPPED | OUTPATIENT
Start: 2018-06-15 | End: 2018-06-15

## 2018-06-15 RX ORDER — AMOXICILLIN AND CLAVULANATE POTASSIUM 875; 125 MG/1; MG/1
1 TABLET, FILM COATED ORAL 2 TIMES DAILY
Qty: 14 TAB | Refills: 0 | Status: SHIPPED | OUTPATIENT
Start: 2018-06-15 | End: 2018-06-22

## 2018-06-15 RX ORDER — AMOXICILLIN AND CLAVULANATE POTASSIUM 875; 125 MG/1; MG/1
1 TABLET, FILM COATED ORAL 2 TIMES DAILY
Qty: 14 TAB | Refills: 0 | Status: SHIPPED | OUTPATIENT
Start: 2018-06-15 | End: 2018-06-15

## 2018-06-15 RX ORDER — SULFAMETHOXAZOLE AND TRIMETHOPRIM 800; 160 MG/1; MG/1
1 TABLET ORAL 2 TIMES DAILY
Qty: 14 TAB | Refills: 0 | Status: SHIPPED | OUTPATIENT
Start: 2018-06-15 | End: 2018-06-22

## 2018-06-15 RX ADMIN — SULFAMETHOXAZOLE AND TRIMETHOPRIM 1 TABLET: 800; 160 TABLET ORAL at 21:42

## 2018-06-15 RX ADMIN — SODIUM CHLORIDE 3 G: 900 INJECTION INTRAVENOUS at 21:15

## 2018-06-15 ASSESSMENT — ENCOUNTER SYMPTOMS
PSYCHIATRIC NEGATIVE: 1
GASTROINTESTINAL NEGATIVE: 1
RESPIRATORY NEGATIVE: 1
CONSTITUTIONAL NEGATIVE: 1
CARDIOVASCULAR NEGATIVE: 1

## 2018-06-16 ENCOUNTER — PATIENT OUTREACH (OUTPATIENT)
Dept: HEALTH INFORMATION MANAGEMENT | Facility: OTHER | Age: 48
End: 2018-06-16

## 2018-06-16 NOTE — ED PROVIDER NOTES
ED Provider Note    CHIEF COMPLAINT  Chief Complaint   Patient presents with   • Sent from Urgent Care   • Toe Pain       HPI  Richard Franklin is a 48 y.o. male who presents To the emergency department complaining of left great toe pain.  The patient's of pain and swelling left over the last several days.  He went to cut his toenail and he noticed the toenail is loose board inserts into the toe and a bunch of clear and yellow fluid drained out.  Some swelling and redness over the toe.  He went to urgent care today and was sent here for further evaluation.  The patient states that he did injure his for some time ago.  Has not been having foot pain because he has such severe neuropathy.  Denies any fevers, chills or systemic complaints.  Denies any aggravating or alleviating factors or associated complaints.     REVIEW OF SYSTEMS  See HPI for further details. All other systems are negative.    PAST MEDICAL HISTORY  Past Medical History:   Diagnosis Date   • Chronic combined systolic and diastolic heart failure (HCC)    • Dilated cardiomyopathy (HCC)     rEFof 30-35%   • DM (diabetes mellitus) (HCC)    • History of obesity    • Hyperlipoproteinemia    • Hypertension    • Hypertriglyceridemia    • Mixed hyperlipidemia    • Palpitations    • PVC (premature ventricular contraction)    • Sleep apnea    • Ventricular tachycardia (HCC)        FAMILY HISTORY  Family History   Problem Relation Age of Onset   • Hypertension Mother        SOCIAL HISTORY  Social History     Social History   • Marital status:      Spouse name: N/A   • Number of children: N/A   • Years of education: N/A     Social History Main Topics   • Smoking status: Never Smoker   • Smokeless tobacco: Former User   • Alcohol use Yes      Comment: Occasionally   • Drug use: No   • Sexual activity: Not on file     Other Topics Concern   • Not on file     Social History Narrative   • No narrative on file       SURGICAL HISTORY  Past Surgical History:  "  Procedure Laterality Date   • AICD IMPLANT     • CARDIAC CATH     • OTHER      THROAT SURGERY: Uvulopalatopharyngoplasty.       CURRENT MEDICATIONS  Home Medications     Reviewed by Ned Vanessa R.N. (Registered Nurse) on 06/15/18 at 1954  Med List Status: Partial   Medication Last Dose Status   carvedilol (COREG) 25 MG Tab 6/15/2018 Active   celecoxib (CELEBREX) 200 MG Cap 6/15/2018 Active   hydrocodone-acetaminophen (NORCO) 5-325 MG Tab per tablet 6/15/2018 Active   Icosapent Ethyl (VASCEPA) 1 g Cap 6/15/2018 Active   insulin aspart (NOVOLOG) 100 UNIT/ML Solution 6/15/2018 Active   Insulin Degludec (TRESIBA FLEXTOUCH) 100 UNIT/ML Solution Pen-injector 6/15/2018 Active   lisinopril (PRINIVIL) 20 MG Tab 6/15/2018 Active   Non Formulary Request 4/23/2018 Active   sildenafil citrate (VIAGRA) 100 MG tablet 6/8/2018 Active   SitaGLIPtin-MetFORMIN HCl (JANUMET XR)  MG TABLET SR 24 HR 6/15/2018 Active   spironolactone (ALDACTONE) 25 MG Tab 6/15/2018 Active                ALLERGIES  No Known Allergies    PHYSICAL EXAM  VITAL SIGNS: /76   Pulse 89   Temp 36.5 °C (97.7 °F)   Resp 18   Ht 1.93 m (6' 4\")   Wt (!) 128.1 kg (282 lb 6.6 oz)   SpO2 97%   BMI 34.38 kg/m²    Constitutional: Well developed, Well nourished, No acute distress, Non-toxic appearance.   HENT: Normocephalic, Atraumatic, Bilateral external ears normal, Oropharynx moist, No oral exudates, Nose normal.   Eyes: PERRL, EOMI, Conjunctiva normal, No discharge.   Neck: Normal range of motion, No tenderness, Supple, No stridor.   Cardiovascular: Normal heart rate, Normal rhythm, No murmurs, No rubs, No gallops.   Thorax & Lungs: Normal breath sounds, No respiratory distress, No wheezing,  Abdomen: Bowel sounds normal, Soft, No tenderness,   Musculoskeletal: Good range of motion in all major joints. Left great toe is swollen, and erythematous.  The toenail is loose and only attached distally.  The base is out of the nail bed.  There is " purulent drainage around the toe.  He has good pulses.  His foot is mildly red, but not hot.  Mild symmetric edema is noted  Neurologic: Alert & oriented x 3,No focal deficits noted.   Psychiatric: Affect normal       RADIOLOGY/PROCEDURES  No orders to display   4th metatarsal fracture  Xray from urgent care        COURSE & MEDICAL DECISION MAKING  Pertinent Labs & Imaging studies reviewed. (See chart for details)  The patient presents to the left great toe pain secondary to urgent care.  His toenail is essentially hanging off and this is just removed.  The pus behind it was expressing his drain that cavity was irrigated.  He has cellulitis on his toe only.  Labs are reassuring.  He has a slightly elevated CRP, CBC, CMP is unremarkable.  X-ray to urgent care.  She is a 4th metatarsal fracture, but nothing else.    The patient has received IV Unasyn.  At this point.  Cellulitis is outlined.  I don't think is an outside keeping him in the hospital.  He'll be put on oral Augmentin and Septra.  Septra was started here in the ER.  He'll return in the morning if it's worse or if it worsens at all, he'll return immediately.  Otherwise, the Augmentin and Septra.  He is given close return precautions, which understands.  He is referred to the orthopedic doctor on call for his metatarsal fracture.  He is to return for pain, redness, swelling, fevers, chills, ill appearance, or other concerns.  He is agreeable with the plan.  His grades are answered.  He is discharged in good condition.        The patient was noted to have elevated blood pressure while in the ER and was counseled to see their doctor within one wee to have this rechecked    Follow-up Dr. Jack for foot fracture.    1. Paronychia of toe of left foot    2. Cellulitis of left upper extremity    3. Diabetes mellitus without complication (HCC)    4. Closed nondisplaced fracture of fourth metatarsal bone of left foot, initial encounter        FINAL IMPRESSION  1.  Paronychia of toe of left foot    2. Cellulitis of left upper extremity    3. Diabetes mellitus without complication (HCC)    4. Closed nondisplaced fracture of fourth metatarsal bone of left foot, initial encounter        2.   3.         Electronically signed by: Hussein Bonilla, 6/15/2018 8:48 PM

## 2018-06-16 NOTE — PROGRESS NOTES
Subjective:      Richard Franklin is a 48 y.o. male who presents with Nail Problem (L big toe redness, pus x6days )        HPI    The patient is a 48-year-old male with a history of diabetes who presents to the clinic today with left great toe erythema and swelling. States his symptoms started 6 days ago with noticing his left toenail a black color. The blackness has since resolved but he has noticed swelling and redness to the toe for the past 2 days. With his diabetes he does not have any sensation from his ankles to his toes. He may have knocked his foot on the edge of the bed frame but cannot recall any significant injury or trauma. He denies fever, chills, malaise. He has wash the wound with hydrogen peroxide or symptom relief. His last tetanus booster was in 2014. His blood sugars have been in the low to mid 100s.    Past Medical History:   Diagnosis Date   • Chronic combined systolic and diastolic heart failure (HCC)    • Dilated cardiomyopathy (HCC)     rEFof 30-35%   • DM (diabetes mellitus) (HCC)    • History of obesity    • Hyperlipoproteinemia    • Hypertension    • Hypertriglyceridemia    • Mixed hyperlipidemia    • Palpitations    • PVC (premature ventricular contraction)    • Sleep apnea    • Ventricular tachycardia (HCC)      Past Surgical History:   Procedure Laterality Date   • AICD IMPLANT     • CARDIAC CATH     • OTHER      THROAT SURGERY: Uvulopalatopharyngoplasty.     Current Outpatient Prescriptions on File Prior to Visit   Medication Sig Dispense Refill   • carvedilol (COREG) 25 MG Tab Take 1 Tab by mouth 2 Times a Day. 60 Tab 11   • lisinopril (PRINIVIL) 20 MG Tab Take 1 Tab by mouth every day. 90 Tab 3   • spironolactone (ALDACTONE) 25 MG Tab Take 1 Tab by mouth every day. 30 Tab 11   • hydrocodone-acetaminophen (NORCO) 5-325 MG Tab per tablet Take 1-2 Tabs by mouth every four hours as needed.     • celecoxib (CELEBREX) 200 MG Cap Take 200 mg by mouth 2 times a day.     •  "SitaGLIPtin-MetFORMIN HCl (JANUMET XR)  MG TABLET SR 24 HR Take 2 Tabs by mouth every day.     • Icosapent Ethyl (VASCEPA) 1 g Cap Take 2 Caps by mouth 2 Times a Day.     • insulin aspart (NOVOLOG) 100 UNIT/ML Solution Inject 30-50 Units as instructed 3 times a day before meals. 30 AM  50 AFTERNOON  30 PM     • sildenafil citrate (VIAGRA) 100 MG tablet Take 1 Tab by mouth as needed for Erectile Dysfunction. 3 Tab 3   • Non Formulary Request        No current facility-administered medications on file prior to visit.      Patient has no known allergies.    Review of Systems   Constitutional: Negative.    HENT: Negative.    Respiratory: Negative.    Cardiovascular: Negative.    Gastrointestinal: Negative.    Genitourinary: Negative.    Musculoskeletal: Negative for joint pain.   Skin:        Erythema and swelling to left great toe   Neurological:        Chronic neuropathy   Psychiatric/Behavioral: Negative.           Objective:     /70   Pulse 85   Temp 36.6 °C (97.9 °F)   Resp 16   Ht 1.854 m (6' 1\")   Wt 123.4 kg (272 lb)   SpO2 98%   BMI 35.89 kg/m²      Physical Exam   Constitutional: He is oriented to person, place, and time. Vital signs are normal. He appears well-developed and well-nourished. He is active. He does not have a sickly appearance. He does not appear ill. No distress.   HENT:   Head: Normocephalic and atraumatic.   Right Ear: External ear normal.   Left Ear: External ear normal.   Nose: Nose normal.   Mouth/Throat: Oropharynx is clear and moist.   Eyes: Conjunctivae are normal. Pupils are equal, round, and reactive to light. Right eye exhibits no discharge. Left eye exhibits no discharge. No scleral icterus.   Neck: Normal range of motion. Neck supple. No JVD present.   Cardiovascular: Normal rate, regular rhythm, normal heart sounds and intact distal pulses.    Pulmonary/Chest: Effort normal and breath sounds normal. No stridor. No respiratory distress.   Musculoskeletal: Normal " range of motion. He exhibits edema. He exhibits no tenderness.        Left foot: There is swelling. There is normal range of motion, no tenderness, no bony tenderness, normal capillary refill, no crepitus and no deformity.        Feet:    Lymphadenopathy:     He has no cervical adenopathy.   Neurological: He is alert and oriented to person, place, and time. A sensory deficit is present. No cranial nerve deficit. He exhibits normal muscle tone. Coordination normal.   Skin: Skin is warm. Capillary refill takes less than 2 seconds. He is not diaphoretic. There is erythema. No pallor.   Psychiatric: He has a normal mood and affect. His behavior is normal. Judgment and thought content normal.   Vitals reviewed.              Assessment/Plan:     1. Cellulitis of left toe  DX-TOE(S) 2+ LEFT   2. Closed nondisplaced fracture of fourth metatarsal bone of left foot, initial encounter     3. History of diabetes mellitus           - DX-TOE(S) 2+ LEFT reviewed by myself, radiology readin.  Fracture of the proximal fourth metatarsal bone.    2.  No acute fracture or malalignment within the first digit.      The patient is a pleasant 48-year-old male with a history of diabetes who presents to the clinic today with left great toe erythema and swelling. His x-ray is negative for fracture of this digit but does show an incidental finding of fracture of his proximal fourth metatarsal. At this time, I feel the patient requires a higher level of care including closer monitoring, stat lab work and/or imaging for further evaluation process for further infection and/or possible consult. This has been discussed with the patient and he states agreement and understanding. The patient would like to go to [facility] ED, the TIM (Irene) has been contacted regarding patient and will be anticipating patient's arrival. The patient is stable to leave [facility] at this time and will go directly to ED without delay. He is not placed in a splint but  instead is given crutches for ambulation until further plan of care can be decided in the emergency department setting. He will be taken to the emergency department by his wife.      BECKY Aceves.

## 2018-06-16 NOTE — DISCHARGE INSTRUCTIONS
Return to the ER for more pain, swelling, redness, fever, or other concerns, or if not better in 2 days. Medications as prescribed. Follow-up with your doctor    Cellulitis, Adult  Cellulitis is a skin infection. The infected area is usually red and tender. This condition occurs most often in the arms and lower legs. The infection can travel to the muscles, blood, and underlying tissue and become serious. It is very important to get treated for this condition.  What are the causes?  Cellulitis is caused by bacteria. The bacteria enter through a break in the skin, such as a cut, burn, insect bite, open sore, or crack.  What increases the risk?  This condition is more likely to occur in people who:  · Have a weak defense system (immune system).  · Have open wounds on the skin such as cuts, burns, bites, and scrapes. Bacteria can enter the body through these open wounds.  · Are older.  · Have diabetes.  · Have a type of long-lasting (chronic) liver disease (cirrhosis) or kidney disease.  · Use IV drugs.  What are the signs or symptoms?  Symptoms of this condition include:  · Redness, streaking, or spotting on the skin.  · Swollen area of the skin.  · Tenderness or pain when an area of the skin is touched.  · Warm skin.  · Fever.  · Chills.  · Blisters.  How is this diagnosed?  This condition is diagnosed based on a medical history and physical exam. You may also have tests, including:  · Blood tests.  · Lab tests.  · Imaging tests.  How is this treated?  Treatment for this condition may include:  · Medicines, such as antibiotic medicines or antihistamines.  · Supportive care, such as rest and application of cold or warm cloths (cold or warm compresses) to the skin.  · Hospital care, if the condition is severe.  The infection usually gets better within 1-2 days of treatment.  Follow these instructions at home:  · Take over-the-counter and prescription medicines only as told by your health care provider.  · If you were  prescribed an antibiotic medicine, take it as told by your health care provider. Do not stop taking the antibiotic even if you start to feel better.  · Drink enough fluid to keep your urine clear or pale yellow.  · Do not touch or rub the infected area.  · Raise (elevate) the infected area above the level of your heart while you are sitting or lying down.  · Apply warm or cold compresses to the affected area as told by your health care provider.  · Keep all follow-up visits as told by your health care provider. This is important. These visits let your health care provider make sure a more serious infection is not developing.  Contact a health care provider if:  · You have a fever.  · Your symptoms do not improve within 1-2 days of starting treatment.  · Your bone or joint underneath the infected area becomes painful after the skin has healed.  · Your infection returns in the same area or another area.  · You notice a swollen bump in the infected area.  · You develop new symptoms.  · You have a general ill feeling (malaise) with muscle aches and pains.  Get help right away if:  · Your symptoms get worse.  · You feel very sleepy.  · You develop vomiting or diarrhea that persists.  · You notice red streaks coming from the infected area.  · Your red area gets larger or turns dark in color.  This information is not intended to replace advice given to you by your health care provider. Make sure you discuss any questions you have with your health care provider.  Document Released: 09/27/2006 Document Revised: 04/27/2017 Document Reviewed: 10/26/2016  [employer] Interactive Patient Education © 2017 [employer]      Foot Fracture  Your caregiver has diagnosed you as having a foot fracture (broken bone). Your foot has many bones. You have a fracture, or break, in one of these bones. In some cases, your doctor may put on a splint or removable fracture boot until the swelling in your foot has lessened. A cast may or may not be  required.  HOME CARE INSTRUCTIONS   If you do not have a cast or splint:  · You may bear weight on your injured foot as tolerated or advised.   · Do not put any weight on your injured foot for as long as directed by your caregiver. Slowly increase the amount of time you walk on the foot as the pain and swelling allows or as advised.   · Use crutches until you can bear weight without pain. A gradual increase in weight bearing may help.   · Apply ice to the injury for 15-20 minutes each hour while awake for the first 2 days. Put the ice in a plastic bag and place a towel between the bag of ice and your skin.   · If an ace bandage (stretchy, elastic wrapping bandage) was applied, you may re-wrap it if ankle is more painful or your toes become cold and swollen.   If you have a cast or splint:  · Use your crutches for as long as directed by your caregiver.   · To lessen the swelling, keep the injured foot elevated on pillows while lying down or sitting. Elevate your foot above your heart.   · Apply ice to the injury for 15-20 minutes each hour while awake for the first 2 days. Put the ice in a plastic bag and place a thin towel between the bag of ice and your cast.   · Plaster or fiberglass cast:   · Do not try to scratch the skin under the cast using a sharp or pointed object down the cast.   · Check the skin around the cast every day. You may put lotion on any red or sore areas.   · Keep your cast clean and dry.   · Plaster splint:   · Wear the splint until you are seen for a follow-up examination.   · You may loosen the elastic around the splint if your toes become numb, tingle, or turn blue or cold. Do not rest it on anything harder than a pillow in the first 24 hours.   · Do not put pressure on any part of your splint. Use your crutches as directed.   · Keep your splint dry. It can be protected during bathing with a plastic bag. Do not lower the splint into water.   · If you have a fracture boot you may remove it to  shower. Bear weight only as instructed by your caregiver.   · Only take over-the-counter or prescription medicines for pain, discomfort, or fever as directed by your caregiver.   SEEK IMMEDIATE MEDICAL CARE IF:   · Your cast gets damaged or breaks.   · You have continued severe pain or more swelling than you did before the cast was put on.   · Your skin or nails of your casted foot turn blue, gray, feel cold or numb.   · There is a bad smell from your cast.   · There is severe pain with movement of your toes.   · There are new stains and/or drainage coming from under the cast.   MAKE SURE YOU:   · Understand these instructions.   · Will watch your condition.   · Will get help right away if you are not doing well or get worse.   Document Released: 12/15/2001 Document Revised: 03/11/2013 Document Reviewed: 01/21/2010  [employer]® Patient Information ©2013 [email or web address].

## 2018-06-16 NOTE — ED NOTES
Pt given written and oral discharge instructions. Pt verbalized understanding of all instructions given. All questions answered. VSS. IV removed. Pt given paper prescriptions as ordered and educated on use and side effects. Pt given f/u instructions with verbalization of understanding and given detailed instructions on s/s of when to return to the ER.

## 2018-06-16 NOTE — ED NOTES
ERP at bedside removing left large toenail. IV started, labs drawn and walked to lab. 2x blood cultures drawn and pt on monitor.

## 2018-06-16 NOTE — PROGRESS NOTES
Placed discharge outreach phone call to patient s/p ER discharge 6/15/18.  Left voicemail providing my contact information and instructions to call with any questions or concerns.

## 2018-06-16 NOTE — ED NOTES
Post op boot applied as ordered. Wound to left large toe irrigated and dressed. Wound marked as ordered with surgical pen. Pt refusing crutches stating he has some already in the car. ERP aware.

## 2018-06-18 ENCOUNTER — HOSPITAL ENCOUNTER (INPATIENT)
Dept: HOSPITAL 8 - ORIP | Age: 48
LOS: 2 days | Discharge: HOME | DRG: 460 | End: 2018-06-20
Attending: ORTHOPAEDIC SURGERY | Admitting: ORTHOPAEDIC SURGERY
Payer: COMMERCIAL

## 2018-06-18 VITALS — BODY MASS INDEX: 33.18 KG/M2 | HEIGHT: 76 IN | WEIGHT: 272.49 LBS

## 2018-06-18 DIAGNOSIS — Z98.1: ICD-10-CM

## 2018-06-18 DIAGNOSIS — E11.9: ICD-10-CM

## 2018-06-18 DIAGNOSIS — E78.1: ICD-10-CM

## 2018-06-18 DIAGNOSIS — M53.3: Primary | ICD-10-CM

## 2018-06-18 DIAGNOSIS — Z95.810: ICD-10-CM

## 2018-06-18 DIAGNOSIS — I42.9: ICD-10-CM

## 2018-06-18 DIAGNOSIS — I50.22: ICD-10-CM

## 2018-06-18 DIAGNOSIS — I11.0: ICD-10-CM

## 2018-06-18 PROCEDURE — 85025 COMPLETE CBC W/AUTO DIFF WBC: CPT

## 2018-06-18 PROCEDURE — 72202 X-RAY EXAM SI JOINTS 3/> VWS: CPT

## 2018-06-18 PROCEDURE — 80053 COMPREHEN METABOLIC PANEL: CPT

## 2018-06-18 PROCEDURE — 83036 HEMOGLOBIN GLYCOSYLATED A1C: CPT

## 2018-06-18 PROCEDURE — 36415 COLL VENOUS BLD VENIPUNCTURE: CPT

## 2018-06-18 PROCEDURE — C1776 JOINT DEVICE (IMPLANTABLE): HCPCS

## 2018-06-18 PROCEDURE — 84100 ASSAY OF PHOSPHORUS: CPT

## 2018-06-18 PROCEDURE — 76001: CPT

## 2018-06-18 PROCEDURE — 83735 ASSAY OF MAGNESIUM: CPT

## 2018-06-18 PROCEDURE — 82962 GLUCOSE BLOOD TEST: CPT

## 2018-06-19 VITALS — SYSTOLIC BLOOD PRESSURE: 130 MMHG | DIASTOLIC BLOOD PRESSURE: 76 MMHG

## 2018-06-19 PROCEDURE — 4A11X4G MONITORING OF PERIPHERAL NERVOUS ELECTRICAL ACTIVITY, INTRAOPERATIVE, EXTERNAL APPROACH: ICD-10-PCS | Performed by: ORTHOPAEDIC SURGERY

## 2018-06-19 PROCEDURE — 0SG834Z FUSION OF LEFT SACROILIAC JOINT WITH INTERNAL FIXATION DEVICE, PERCUTANEOUS APPROACH: ICD-10-PCS | Performed by: ORTHOPAEDIC SURGERY

## 2018-06-19 RX ADMIN — FENTANYL CITRATE PRN MCG: 50 INJECTION INTRAMUSCULAR; INTRAVENOUS at 14:45

## 2018-06-19 RX ADMIN — FENTANYL CITRATE PRN MCG: 50 INJECTION INTRAMUSCULAR; INTRAVENOUS at 15:00

## 2018-06-19 RX ADMIN — FENTANYL CITRATE PRN MCG: 50 INJECTION INTRAMUSCULAR; INTRAVENOUS at 14:25

## 2018-06-19 RX ADMIN — CARVEDILOL SCH MG: 25 TABLET, FILM COATED ORAL at 21:20

## 2018-06-19 RX ADMIN — INSULIN LISPRO SCH UNITS: 100 INJECTION, SOLUTION INTRAVENOUS; SUBCUTANEOUS at 17:52

## 2018-06-19 RX ADMIN — INSULIN LISPRO SCH UNITS: 100 INJECTION, SOLUTION INTRAVENOUS; SUBCUTANEOUS at 21:31

## 2018-06-19 RX ADMIN — FENTANYL CITRATE PRN MCG: 50 INJECTION INTRAMUSCULAR; INTRAVENOUS at 14:35

## 2018-06-19 RX ADMIN — BUDESONIDE AND FORMOTEROL FUMARATE DIHYDRATE SCH CAP: 160; 4.5 AEROSOL RESPIRATORY (INHALATION) at 21:00

## 2018-06-19 RX ADMIN — SODIUM CHLORIDE AND POTASSIUM CHLORIDE SCH MLS/HR: .9; .15 SOLUTION INTRAVENOUS at 18:17

## 2018-06-19 RX ADMIN — FENTANYL CITRATE PRN MCG: 50 INJECTION INTRAMUSCULAR; INTRAVENOUS at 14:30

## 2018-06-19 RX ADMIN — CEFAZOLIN SODIUM SCH MLS/HR: 1 SOLUTION INTRAVENOUS at 20:12

## 2018-06-20 VITALS — DIASTOLIC BLOOD PRESSURE: 67 MMHG | SYSTOLIC BLOOD PRESSURE: 113 MMHG

## 2018-06-20 VITALS — DIASTOLIC BLOOD PRESSURE: 77 MMHG | SYSTOLIC BLOOD PRESSURE: 112 MMHG

## 2018-06-20 VITALS — DIASTOLIC BLOOD PRESSURE: 78 MMHG | SYSTOLIC BLOOD PRESSURE: 118 MMHG

## 2018-06-20 LAB
ALBUMIN SERPL-MCNC: 3.1 G/DL (ref 3.4–5)
ALP SERPL-CCNC: 42 U/L (ref 45–117)
ALT SERPL-CCNC: 32 U/L (ref 12–78)
ANION GAP SERPL CALC-SCNC: 5 MMOL/L (ref 5–15)
BACTERIA BLD CULT: NORMAL
BACTERIA BLD CULT: NORMAL
BASOPHILS # BLD AUTO: 0.02 X10^3/UL (ref 0–0.1)
BASOPHILS NFR BLD AUTO: 0 % (ref 0–1)
BILIRUB SERPL-MCNC: 0.4 MG/DL (ref 0.2–1)
CALCIUM SERPL-MCNC: 7.8 MG/DL (ref 8.5–10.1)
CHLORIDE SERPL-SCNC: 110 MMOL/L (ref 98–107)
CREAT SERPL-MCNC: 1.99 MG/DL (ref 0.7–1.3)
EOSINOPHIL # BLD AUTO: 0.17 X10^3/UL (ref 0–0.4)
EOSINOPHIL NFR BLD AUTO: 2 % (ref 1–7)
ERYTHROCYTE [DISTWIDTH] IN BLOOD BY AUTOMATED COUNT: 14.3 % (ref 9.4–14.8)
EST. AVERAGE GLUCOSE BLD GHB EST-MCNC: 189 MG/DL (ref 0–126)
HBA1C MFR BLD: 8.2 % (ref 4.2–6.3)
LYMPHOCYTES # BLD AUTO: 1.3 X10^3/UL (ref 1–3.4)
LYMPHOCYTES NFR BLD AUTO: 15 % (ref 22–44)
MCH RBC QN AUTO: 30.3 PG (ref 27.5–34.5)
MCHC RBC AUTO-ENTMCNC: 34.1 G/DL (ref 33.2–36.2)
MCV RBC AUTO: 89 FL (ref 81–97)
MD: NO
MONOCYTES # BLD AUTO: 0.95 X10^3/UL (ref 0.2–0.8)
MONOCYTES NFR BLD AUTO: 11 % (ref 2–9)
NEUTROPHILS # BLD AUTO: 6.13 X10^3/UL (ref 1.8–6.8)
NEUTROPHILS NFR BLD AUTO: 72 % (ref 42–75)
PLATELET # BLD AUTO: 171 X10^3/UL (ref 130–400)
PMV BLD AUTO: 8.4 FL (ref 7.4–10.4)
PROT SERPL-MCNC: 6.1 G/DL (ref 6.4–8.2)
RBC # BLD AUTO: 4.07 X10^6/UL (ref 4.38–5.82)
SIGNIFICANT IND 70042: NORMAL
SIGNIFICANT IND 70042: NORMAL
SITE SITE: NORMAL
SITE SITE: NORMAL
SOURCE SOURCE: NORMAL
SOURCE SOURCE: NORMAL

## 2018-06-20 RX ADMIN — INSULIN LISPRO SCH UNITS: 100 INJECTION, SOLUTION INTRAVENOUS; SUBCUTANEOUS at 12:06

## 2018-06-20 RX ADMIN — BUDESONIDE AND FORMOTEROL FUMARATE DIHYDRATE SCH CAP: 160; 4.5 AEROSOL RESPIRATORY (INHALATION) at 08:02

## 2018-06-20 RX ADMIN — INSULIN LISPRO SCH UNITS: 100 INJECTION, SOLUTION INTRAVENOUS; SUBCUTANEOUS at 08:02

## 2018-06-20 RX ADMIN — OXYCODONE HYDROCHLORIDE AND ACETAMINOPHEN PRN TAB: 5; 325 TABLET ORAL at 08:05

## 2018-06-20 RX ADMIN — OXYCODONE HYDROCHLORIDE AND ACETAMINOPHEN PRN TAB: 5; 325 TABLET ORAL at 12:06

## 2018-06-20 RX ADMIN — CEFAZOLIN SODIUM SCH MLS/HR: 1 SOLUTION INTRAVENOUS at 05:50

## 2018-06-20 RX ADMIN — SODIUM CHLORIDE AND POTASSIUM CHLORIDE SCH MLS/HR: .9; .15 SOLUTION INTRAVENOUS at 01:00

## 2018-06-20 RX ADMIN — CARVEDILOL SCH MG: 25 TABLET, FILM COATED ORAL at 08:05

## 2018-06-20 RX ADMIN — SODIUM CHLORIDE AND POTASSIUM CHLORIDE SCH MLS/HR: .9; .15 SOLUTION INTRAVENOUS at 09:00

## 2018-09-21 ENCOUNTER — IMMUNIZATION (OUTPATIENT)
Dept: SOCIAL WORK | Facility: CLINIC | Age: 48
End: 2018-09-21
Payer: COMMERCIAL

## 2018-09-21 DIAGNOSIS — Z23 NEED FOR VACCINATION: ICD-10-CM

## 2018-09-21 PROCEDURE — 90471 IMMUNIZATION ADMIN: CPT | Performed by: REGISTERED NURSE

## 2018-09-21 PROCEDURE — 90686 IIV4 VACC NO PRSV 0.5 ML IM: CPT | Performed by: REGISTERED NURSE

## 2018-10-12 ENCOUNTER — APPOINTMENT (OUTPATIENT)
Dept: SOCIAL WORK | Facility: CLINIC | Age: 48
End: 2018-10-12
Payer: COMMERCIAL

## 2018-10-23 ENCOUNTER — OFFICE VISIT (OUTPATIENT)
Dept: CARDIOLOGY | Facility: MEDICAL CENTER | Age: 48
End: 2018-10-23
Payer: COMMERCIAL

## 2018-10-23 VITALS
HEART RATE: 90 BPM | HEIGHT: 76 IN | WEIGHT: 277.8 LBS | DIASTOLIC BLOOD PRESSURE: 98 MMHG | BODY MASS INDEX: 33.83 KG/M2 | SYSTOLIC BLOOD PRESSURE: 142 MMHG | OXYGEN SATURATION: 96 %

## 2018-10-23 DIAGNOSIS — G47.30 SLEEP APNEA, UNSPECIFIED TYPE: ICD-10-CM

## 2018-10-23 DIAGNOSIS — I50.20 ACC/AHA STAGE C SYSTOLIC HEART FAILURE (HCC): ICD-10-CM

## 2018-10-23 DIAGNOSIS — I44.7 LBBB (LEFT BUNDLE BRANCH BLOCK): ICD-10-CM

## 2018-10-23 DIAGNOSIS — I10 HTN (HYPERTENSION), MALIGNANT: ICD-10-CM

## 2018-10-23 DIAGNOSIS — E11.65 TYPE 2 DIABETES MELLITUS WITH HYPERGLYCEMIA, WITH LONG-TERM CURRENT USE OF INSULIN (HCC): ICD-10-CM

## 2018-10-23 DIAGNOSIS — I42.8 NON-ISCHEMIC CARDIOMYOPATHY (HCC): ICD-10-CM

## 2018-10-23 DIAGNOSIS — I47.20 VT (VENTRICULAR TACHYCARDIA) (HCC): ICD-10-CM

## 2018-10-23 DIAGNOSIS — R06.09 DYSPNEA ON EXERTION: ICD-10-CM

## 2018-10-23 DIAGNOSIS — Z79.4 TYPE 2 DIABETES MELLITUS WITH HYPERGLYCEMIA, WITH LONG-TERM CURRENT USE OF INSULIN (HCC): ICD-10-CM

## 2018-10-23 DIAGNOSIS — Z79.899 HIGH RISK MEDICATION USE: ICD-10-CM

## 2018-10-23 DIAGNOSIS — I50.22 CHRONIC SYSTOLIC HEART FAILURE (HCC): ICD-10-CM

## 2018-10-23 DIAGNOSIS — Z95.810 ICD (IMPLANTABLE CARDIOVERTER-DEFIBRILLATOR) IN PLACE: ICD-10-CM

## 2018-10-23 DIAGNOSIS — I51.89 LEFT VENTRICULAR SYSTOLIC DYSFUNCTION, NYHA CLASS 1: ICD-10-CM

## 2018-10-23 PROCEDURE — 99214 OFFICE O/P EST MOD 30 MIN: CPT | Performed by: INTERNAL MEDICINE

## 2018-10-23 RX ORDER — LISINOPRIL 40 MG/1
40 TABLET ORAL DAILY
Qty: 90 TAB | Refills: 3 | Status: SHIPPED | OUTPATIENT
Start: 2018-10-23 | End: 2019-04-15 | Stop reason: SDUPTHER

## 2018-10-23 ASSESSMENT — ENCOUNTER SYMPTOMS
FEVER: 0
EYE PAIN: 0
MYALGIAS: 0
DOUBLE VISION: 0
HALLUCINATIONS: 0
FALLS: 0
COUGH: 0
SHORTNESS OF BREATH: 0
CHILLS: 0
LOSS OF CONSCIOUSNESS: 0
HEADACHES: 0
BLOOD IN STOOL: 0
SENSORY CHANGE: 0
NAUSEA: 0
PND: 0
BLURRED VISION: 0
PALPITATIONS: 0
WEIGHT LOSS: 0
SPEECH CHANGE: 0
CLAUDICATION: 0
ABDOMINAL PAIN: 0
DIZZINESS: 0
EYE DISCHARGE: 0
ORTHOPNEA: 0
BRUISES/BLEEDS EASILY: 0
DEPRESSION: 0
VOMITING: 0

## 2018-10-23 NOTE — LETTER
[facility]   [address]  [place] [address]  Phone: [phone]  Fax: [phone]              Richard Franklin  1970    Encounter Date: 10/23/2018    Jessica Romano M.D.          PROGRESS NOTE:  Chief Complaint   Patient presents with   • Congestive Heart Failure     HF ext.       Subjective:   Richard Franklin is a 48 y.o. male who presents today For cardiac care and evaluation and management of congestive heart failure. Patient does have a history of cardiomyopathy for many years. It is thought to be related to untreated hypertension. Recently in November 2017, patient was in the hospital because of ventricular tachycardia. Patient had an invasive coronary angiogram at that time which showed nonobstructive CAD with 60% mid LAD lesion without significant hemodynamic evaluated by FFR. Patient underwent biventricular ICD placement for secondary prevention. Currently, he is only paced 80% of the time due to ectopies.     Patient is feeling very well. No dyspnea. He is very active. Able to walk up 2 flights of stairs without problems.     LVEF is still 35%.     Patient was able to complete 408 m during his 6 minute walk test. his O2 saturation at baseline was 98% and at the end of the test, the O2 saturation was 97%. he reported 0.5 level of dyspnea on Francesco scale.     +ED.     Blood pressure is still elevated.    Past Medical History:   Diagnosis Date   • Chronic combined systolic and diastolic heart failure (HCC)    • Dilated cardiomyopathy (HCC)     rEFof 30-35%   • DM (diabetes mellitus) (HCC)    • History of obesity    • Hyperlipoproteinemia    • Hypertension    • Hypertriglyceridemia    • Mixed hyperlipidemia    • Palpitations    • PVC (premature ventricular contraction)    • Sleep apnea    • Ventricular tachycardia (HCC)      Past Surgical History:   Procedure Laterality Date   • AICD IMPLANT     • CARDIAC CATH     • OTHER      THROAT  SURGERY: Uvulopalatopharyngoplasty.     Family History   Problem Relation Age of Onset   • Hypertension Mother      Social History     Social History   • Marital status:      Spouse name: N/A   • Number of children: N/A   • Years of education: N/A     Occupational History   • Not on file.     Social History Main Topics   • Smoking status: Never Smoker   • Smokeless tobacco: Former User   • Alcohol use Yes      Comment: Occasionally   • Drug use: No   • Sexual activity: Not on file     Other Topics Concern   • Not on file     Social History Narrative   • No narrative on file     No Known Allergies  Outpatient Encounter Prescriptions as of 10/23/2018   Medication Sig Dispense Refill   • lisinopril (PRINIVIL, ZESTRIL) 40 MG tablet Take 1 Tab by mouth every day. 90 Tab 3   • Insulin Degludec (TRESIBA FLEXTOUCH) 100 UNIT/ML Solution Pen-injector Inject  as instructed.     • carvedilol (COREG) 25 MG Tab Take 1 Tab by mouth 2 Times a Day. 60 Tab 11   • spironolactone (ALDACTONE) 25 MG Tab Take 1 Tab by mouth every day. 30 Tab 11   • sildenafil citrate (VIAGRA) 100 MG tablet Take 1 Tab by mouth as needed for Erectile Dysfunction. 3 Tab 3   • SitaGLIPtin-MetFORMIN HCl (JANUMET XR)  MG TABLET SR 24 HR Take 2 Tabs by mouth every day.     • Icosapent Ethyl (VASCEPA) 1 g Cap Take 2 Caps by mouth 2 Times a Day.     • insulin aspart (NOVOLOG) 100 UNIT/ML Solution Inject 30-50 Units as instructed 3 times a day before meals. 30 AM  50 AFTERNOON  30 PM     • [DISCONTINUED] lisinopril (PRINIVIL) 20 MG Tab Take 1 Tab by mouth every day. 90 Tab 3   • [DISCONTINUED] Non Formulary Request      • [DISCONTINUED] hydrocodone-acetaminophen (NORCO) 5-325 MG Tab per tablet Take 1-2 Tabs by mouth every four hours as needed.     • [DISCONTINUED] celecoxib (CELEBREX) 200 MG Cap Take 200 mg by mouth 2 times a day.       No facility-administered encounter medications on file as of 10/23/2018.      Review of Systems   Constitutional:  "Negative for chills, fever, malaise/fatigue and weight loss.   HENT: Negative for ear discharge, ear pain, hearing loss and nosebleeds.    Eyes: Negative for blurred vision, double vision, pain and discharge.   Respiratory: Negative for cough and shortness of breath.    Cardiovascular: Negative for chest pain, palpitations, orthopnea, claudication, leg swelling and PND.   Gastrointestinal: Negative for abdominal pain, blood in stool, melena, nausea and vomiting.   Genitourinary: Negative for dysuria and hematuria.   Musculoskeletal: Negative for falls, joint pain and myalgias.   Skin: Negative for itching and rash.   Neurological: Negative for dizziness, sensory change, speech change, loss of consciousness and headaches.   Endo/Heme/Allergies: Negative for environmental allergies. Does not bruise/bleed easily.   Psychiatric/Behavioral: Negative for depression, hallucinations and suicidal ideas.        Objective:   /98 (BP Location: Left arm, Patient Position: Sitting, BP Cuff Size: Adult)   Pulse 90   Ht 1.93 m (6' 4\")   Wt (!) 126 kg (277 lb 12.8 oz)   SpO2 96%   BMI 33.81 kg/m²      Physical Exam   Constitutional: He is oriented to person, place, and time. No distress.   HENT:   Head: Normocephalic and atraumatic.   Right Ear: External ear normal.   Left Ear: External ear normal.   Eyes: Right eye exhibits no discharge. Left eye exhibits no discharge.   Neck: No JVD present. No thyromegaly present.   Cardiovascular: Normal rate, regular rhythm, normal heart sounds and intact distal pulses.  Exam reveals no gallop and no friction rub.    No murmur heard.  Pulmonary/Chest: Breath sounds normal. No respiratory distress.   Abdominal: Bowel sounds are normal. He exhibits no distension. There is no tenderness.   Musculoskeletal: He exhibits no edema or tenderness.   Neurological: He is alert and oriented to person, place, and time. No cranial nerve deficit.   Skin: Skin is warm and dry. He is not diaphoretic. "   Psychiatric: He has a normal mood and affect. His behavior is normal.   Nursing note and vitals reviewed.      Assessment:     1. ACC/AHA stage C systolic heart failure (HCC)  lisinopril (PRINIVIL, ZESTRIL) 40 MG tablet    BASIC METABOLIC PANEL   2. Left ventricular systolic dysfunction, NYHA class 1  lisinopril (PRINIVIL, ZESTRIL) 40 MG tablet    BASIC METABOLIC PANEL   3. Non-ischemic cardiomyopathy (HCC)     4. VT (ventricular tachycardia) (HCC)     5. ICD (implantable cardioverter-defibrillator) in place     6. HTN (hypertension), malignant     7. LBBB (left bundle branch block)     8. Chronic systolic heart failure (HCC)     9. Dyspnea on exertion     10. Type 2 diabetes mellitus with hyperglycemia, with long-term current use of insulin (HCC)     11. Sleep apnea, unspecified type     12. High risk medication use  BASIC METABOLIC PANEL       Medical Decision Making:  Today's Assessment / Status / Plan:   Today, based on physical examination findings, patient is euvolemic. No JVD, lungs are clear to auscultation, no pitting edema in bilateral lower extremities, no ascites.    Dry weight is 277 lbs.    Will increase Lisinopril to 40 mg po daily.    Continue Coreg 25 mg po bid and Spironolactone 25 mg po daily for cardiomyopathy.    DM care with PMD.    Will continue to closely monitor for side effects of patient's high risk medication(s) including liver, renal function and electrolytes.    I will see patient back in our Heart Failure Clinic with lab tests and studies results in 12 weeks.    I thank you for referring patient to our Heart Failure Clinic today.            Ravi Mares M.D.  [address] [place] [address]  [place] [address]  VIA Facsimile: [phone]

## 2018-10-23 NOTE — PROGRESS NOTES
Chief Complaint   Patient presents with   • Congestive Heart Failure     HF ext.       Subjective:   Richard Franklin is a 48 y.o. male who presents today For cardiac care and evaluation and management of congestive heart failure. Patient does have a history of cardiomyopathy for many years. It is thought to be related to untreated hypertension. Recently in November 2017, patient was in the hospital because of ventricular tachycardia. Patient had an invasive coronary angiogram at that time which showed nonobstructive CAD with 60% mid LAD lesion without significant hemodynamic evaluated by FFR. Patient underwent biventricular ICD placement for secondary prevention. Currently, he is only paced 80% of the time due to ectopies.     Patient is feeling very well. No dyspnea. He is very active. Able to walk up 2 flights of stairs without problems.     LVEF is still 35%.     Patient was able to complete 408 m during his 6 minute walk test. his O2 saturation at baseline was 98% and at the end of the test, the O2 saturation was 97%. he reported 0.5 level of dyspnea on Francesco scale.     +ED.     Blood pressure is still elevated.    Past Medical History:   Diagnosis Date   • Chronic combined systolic and diastolic heart failure (HCC)    • Dilated cardiomyopathy (HCC)     rEFof 30-35%   • DM (diabetes mellitus) (HCC)    • History of obesity    • Hyperlipoproteinemia    • Hypertension    • Hypertriglyceridemia    • Mixed hyperlipidemia    • Palpitations    • PVC (premature ventricular contraction)    • Sleep apnea    • Ventricular tachycardia (HCC)      Past Surgical History:   Procedure Laterality Date   • AICD IMPLANT     • CARDIAC CATH     • OTHER      THROAT SURGERY: Uvulopalatopharyngoplasty.     Family History   Problem Relation Age of Onset   • Hypertension Mother      Social History     Social History   • Marital status:      Spouse name: N/A   • Number of children: N/A   • Years of education: N/A     Occupational  History   • Not on file.     Social History Main Topics   • Smoking status: Never Smoker   • Smokeless tobacco: Former User   • Alcohol use Yes      Comment: Occasionally   • Drug use: No   • Sexual activity: Not on file     Other Topics Concern   • Not on file     Social History Narrative   • No narrative on file     No Known Allergies  Outpatient Encounter Prescriptions as of 10/23/2018   Medication Sig Dispense Refill   • lisinopril (PRINIVIL, ZESTRIL) 40 MG tablet Take 1 Tab by mouth every day. 90 Tab 3   • Insulin Degludec (TRESIBA FLEXTOUCH) 100 UNIT/ML Solution Pen-injector Inject  as instructed.     • carvedilol (COREG) 25 MG Tab Take 1 Tab by mouth 2 Times a Day. 60 Tab 11   • spironolactone (ALDACTONE) 25 MG Tab Take 1 Tab by mouth every day. 30 Tab 11   • sildenafil citrate (VIAGRA) 100 MG tablet Take 1 Tab by mouth as needed for Erectile Dysfunction. 3 Tab 3   • SitaGLIPtin-MetFORMIN HCl (JANUMET XR)  MG TABLET SR 24 HR Take 2 Tabs by mouth every day.     • Icosapent Ethyl (VASCEPA) 1 g Cap Take 2 Caps by mouth 2 Times a Day.     • insulin aspart (NOVOLOG) 100 UNIT/ML Solution Inject 30-50 Units as instructed 3 times a day before meals. 30 AM  50 AFTERNOON  30 PM     • [DISCONTINUED] lisinopril (PRINIVIL) 20 MG Tab Take 1 Tab by mouth every day. 90 Tab 3   • [DISCONTINUED] Non Formulary Request      • [DISCONTINUED] hydrocodone-acetaminophen (NORCO) 5-325 MG Tab per tablet Take 1-2 Tabs by mouth every four hours as needed.     • [DISCONTINUED] celecoxib (CELEBREX) 200 MG Cap Take 200 mg by mouth 2 times a day.       No facility-administered encounter medications on file as of 10/23/2018.      Review of Systems   Constitutional: Negative for chills, fever, malaise/fatigue and weight loss.   HENT: Negative for ear discharge, ear pain, hearing loss and nosebleeds.    Eyes: Negative for blurred vision, double vision, pain and discharge.   Respiratory: Negative for cough and shortness of breath.   "  Cardiovascular: Negative for chest pain, palpitations, orthopnea, claudication, leg swelling and PND.   Gastrointestinal: Negative for abdominal pain, blood in stool, melena, nausea and vomiting.   Genitourinary: Negative for dysuria and hematuria.   Musculoskeletal: Negative for falls, joint pain and myalgias.   Skin: Negative for itching and rash.   Neurological: Negative for dizziness, sensory change, speech change, loss of consciousness and headaches.   Endo/Heme/Allergies: Negative for environmental allergies. Does not bruise/bleed easily.   Psychiatric/Behavioral: Negative for depression, hallucinations and suicidal ideas.        Objective:   /98 (BP Location: Left arm, Patient Position: Sitting, BP Cuff Size: Adult)   Pulse 90   Ht 1.93 m (6' 4\")   Wt (!) 126 kg (277 lb 12.8 oz)   SpO2 96%   BMI 33.81 kg/m²     Physical Exam   Constitutional: He is oriented to person, place, and time. No distress.   HENT:   Head: Normocephalic and atraumatic.   Right Ear: External ear normal.   Left Ear: External ear normal.   Eyes: Right eye exhibits no discharge. Left eye exhibits no discharge.   Neck: No JVD present. No thyromegaly present.   Cardiovascular: Normal rate, regular rhythm, normal heart sounds and intact distal pulses.  Exam reveals no gallop and no friction rub.    No murmur heard.  Pulmonary/Chest: Breath sounds normal. No respiratory distress.   Abdominal: Bowel sounds are normal. He exhibits no distension. There is no tenderness.   Musculoskeletal: He exhibits no edema or tenderness.   Neurological: He is alert and oriented to person, place, and time. No cranial nerve deficit.   Skin: Skin is warm and dry. He is not diaphoretic.   Psychiatric: He has a normal mood and affect. His behavior is normal.   Nursing note and vitals reviewed.      Assessment:     1. ACC/AHA stage C systolic heart failure (HCC)  lisinopril (PRINIVIL, ZESTRIL) 40 MG tablet    BASIC METABOLIC PANEL   2. Left ventricular " systolic dysfunction, NYHA class 1  lisinopril (PRINIVIL, ZESTRIL) 40 MG tablet    BASIC METABOLIC PANEL   3. Non-ischemic cardiomyopathy (HCC)     4. VT (ventricular tachycardia) (HCC)     5. ICD (implantable cardioverter-defibrillator) in place     6. HTN (hypertension), malignant     7. LBBB (left bundle branch block)     8. Chronic systolic heart failure (HCC)     9. Dyspnea on exertion     10. Type 2 diabetes mellitus with hyperglycemia, with long-term current use of insulin (HCC)     11. Sleep apnea, unspecified type     12. High risk medication use  BASIC METABOLIC PANEL       Medical Decision Making:  Today's Assessment / Status / Plan:   Today, based on physical examination findings, patient is euvolemic. No JVD, lungs are clear to auscultation, no pitting edema in bilateral lower extremities, no ascites.    Dry weight is 277 lbs.    Will increase Lisinopril to 40 mg po daily.    Continue Coreg 25 mg po bid and Spironolactone 25 mg po daily for cardiomyopathy.    DM care with PMD.    Will continue to closely monitor for side effects of patient's high risk medication(s) including liver, renal function and electrolytes.    I will see patient back in our Heart Failure Clinic with lab tests and studies results in 12 weeks.    I thank you for referring patient to our Heart Failure Clinic today.

## 2019-01-01 NOTE — PROGRESS NOTES
Notified MD Pollard of IV infiltration during contrast injection while at CT. Verbal orders obtained to attempt placement of another IV, and to return to the floor if an IV could not be placed at CT.    Attempt to place a new IV was unsuccessful. MD Pollard aware, returned pt to floor.   [Alert] : alert [No Acute Distress] : no acute distress [Normocephalic] : normocephalic [Flat Open Anterior [place]] : flat open anterior fontanelle [Red Reflex Bilateral] : red reflex bilateral [PERRL] : PERRL [Normally Placed Ears] : normally placed ears [Clear Tympanic membranes with present light reflex and bony landmarks] : clear tympanic membranes with present light reflex and bony landmarks [Auricles Well Formed] : auricles well formed [No Discharge] : no discharge [Nares Patent] : nares patent [Uvula Midline] : uvula midline [Palate Intact] : palate intact [No Palpable Masses] : no palpable masses [Supple, full passive range of motion] : supple, full passive range of motion [Clear to Ausculatation Bilaterally] : clear to auscultation bilaterally [Regular Rate and Rhythm] : regular rate and rhythm [Symmetric Chest Rise] : symmetric chest rise [S1, S2 present] : S1, S2 present [No Murmurs] : no murmurs [+2 Femoral Pulses] : +2 femoral pulses [Soft] : soft [Non Distended] : non distended [NonTender] : non tender [Normoactive Bowel Sounds] : normoactive bowel sounds [No Hepatomegaly] : no hepatomegaly [No Splenomegaly] : no splenomegaly [Bl 1] : Lb 1 [No Clitoromegaly] : no clitoromegaly [Patent] : patent [Normally Placed] : normally placed [Normal Vaginal Introitus] : normal vaginal introitus [No Clavicular Crepitus] : no clavicular crepitus [No Abnormal Lymph Nodes Palpated] : no abnormal lymph nodes palpated [Negative Alvarenga-Ortalani] : negative Alvarenga-Ortalani [Symmetric Flexed Extremities] : symmetric flexed extremities [No Spinal Dimple] : no spinal dimple [NoTuft of Hair] : no tuft of hair [Startle Reflex] : startle reflex [Suck Reflex] : suck reflex [Rooting] : rooting [Symmetric Jeffrey] : symmetric jeffrey [Palmar Grasp] : palmar grasp [Plantar Grasp] : plantar grasp [No Jaundice] : no jaundice [No Rash or Lesions] : no rash or lesions

## 2019-01-17 ENCOUNTER — TELEPHONE (OUTPATIENT)
Dept: CARDIOLOGY | Facility: MEDICAL CENTER | Age: 49
End: 2019-01-17

## 2019-01-17 NOTE — TELEPHONE ENCOUNTER
FRANCHESKA to remind patient to complete labs before upcoming appt with Dr. Romano on 01/23/2019.

## 2019-03-27 ENCOUNTER — HOSPITAL ENCOUNTER (OUTPATIENT)
Dept: LAB | Facility: MEDICAL CENTER | Age: 49
End: 2019-03-27
Attending: PHYSICIAN ASSISTANT
Payer: COMMERCIAL

## 2019-03-27 LAB
25(OH)D3 SERPL-MCNC: 19 NG/ML (ref 30–100)
ALBUMIN SERPL BCP-MCNC: 4.1 G/DL (ref 3.2–4.9)
ALBUMIN/GLOB SERPL: 1.5 G/DL
ALP SERPL-CCNC: 49 U/L (ref 30–99)
ALT SERPL-CCNC: 23 U/L (ref 2–50)
ANION GAP SERPL CALC-SCNC: 9 MMOL/L (ref 0–11.9)
AST SERPL-CCNC: 15 U/L (ref 12–45)
BASOPHILS # BLD AUTO: 0.6 % (ref 0–1.8)
BASOPHILS # BLD: 0.05 K/UL (ref 0–0.12)
BILIRUB SERPL-MCNC: 0.6 MG/DL (ref 0.1–1.5)
BUN SERPL-MCNC: 24 MG/DL (ref 8–22)
CALCIUM SERPL-MCNC: 8.8 MG/DL (ref 8.5–10.5)
CHLORIDE SERPL-SCNC: 105 MMOL/L (ref 96–112)
CHOLEST SERPL-MCNC: 176 MG/DL (ref 100–199)
CO2 SERPL-SCNC: 22 MMOL/L (ref 20–33)
CREAT SERPL-MCNC: 1.08 MG/DL (ref 0.5–1.4)
CREAT UR-MCNC: 133.8 MG/DL
EOSINOPHIL # BLD AUTO: 0.31 K/UL (ref 0–0.51)
EOSINOPHIL NFR BLD: 4 % (ref 0–6.9)
ERYTHROCYTE [DISTWIDTH] IN BLOOD BY AUTOMATED COUNT: 45.2 FL (ref 35.9–50)
FASTING STATUS PATIENT QL REPORTED: NORMAL
GLOBULIN SER CALC-MCNC: 2.7 G/DL (ref 1.9–3.5)
GLUCOSE SERPL-MCNC: 265 MG/DL (ref 65–99)
HCT VFR BLD AUTO: 44.1 % (ref 42–52)
HDLC SERPL-MCNC: 26 MG/DL
HGB BLD-MCNC: 14.8 G/DL (ref 14–18)
IMM GRANULOCYTES # BLD AUTO: 0.04 K/UL (ref 0–0.11)
IMM GRANULOCYTES NFR BLD AUTO: 0.5 % (ref 0–0.9)
LDLC SERPL CALC-MCNC: ABNORMAL MG/DL
LYMPHOCYTES # BLD AUTO: 1.82 K/UL (ref 1–4.8)
LYMPHOCYTES NFR BLD: 23.6 % (ref 22–41)
MCH RBC QN AUTO: 30.3 PG (ref 27–33)
MCHC RBC AUTO-ENTMCNC: 33.6 G/DL (ref 33.7–35.3)
MCV RBC AUTO: 90.4 FL (ref 81.4–97.8)
MICROALBUMIN UR-MCNC: 6.6 MG/DL
MICROALBUMIN/CREAT UR: 49 MG/G (ref 0–30)
MONOCYTES # BLD AUTO: 0.68 K/UL (ref 0–0.85)
MONOCYTES NFR BLD AUTO: 8.8 % (ref 0–13.4)
NEUTROPHILS # BLD AUTO: 4.82 K/UL (ref 1.82–7.42)
NEUTROPHILS NFR BLD: 62.5 % (ref 44–72)
NRBC # BLD AUTO: 0 K/UL
NRBC BLD-RTO: 0 /100 WBC
PLATELET # BLD AUTO: 156 K/UL (ref 164–446)
PMV BLD AUTO: 10.9 FL (ref 9–12.9)
POTASSIUM SERPL-SCNC: 3.9 MMOL/L (ref 3.6–5.5)
PROT SERPL-MCNC: 6.8 G/DL (ref 6–8.2)
RBC # BLD AUTO: 4.88 M/UL (ref 4.7–6.1)
SODIUM SERPL-SCNC: 136 MMOL/L (ref 135–145)
T4 FREE SERPL-MCNC: 0.97 NG/DL (ref 0.53–1.43)
TESTOST SERPL-MCNC: 226 NG/DL (ref 175–781)
TRIGL SERPL-MCNC: 632 MG/DL (ref 0–149)
TSH SERPL DL<=0.005 MIU/L-ACNC: 1.56 UIU/ML (ref 0.38–5.33)
WBC # BLD AUTO: 7.7 K/UL (ref 4.8–10.8)

## 2019-03-27 PROCEDURE — 80061 LIPID PANEL: CPT

## 2019-03-27 PROCEDURE — 85025 COMPLETE CBC W/AUTO DIFF WBC: CPT

## 2019-03-27 PROCEDURE — 82570 ASSAY OF URINE CREATININE: CPT

## 2019-03-27 PROCEDURE — 84403 ASSAY OF TOTAL TESTOSTERONE: CPT

## 2019-03-27 PROCEDURE — 36415 COLL VENOUS BLD VENIPUNCTURE: CPT

## 2019-03-27 PROCEDURE — 80053 COMPREHEN METABOLIC PANEL: CPT

## 2019-03-27 PROCEDURE — 83036 HEMOGLOBIN GLYCOSYLATED A1C: CPT

## 2019-03-27 PROCEDURE — 82306 VITAMIN D 25 HYDROXY: CPT

## 2019-03-27 PROCEDURE — 84439 ASSAY OF FREE THYROXINE: CPT

## 2019-03-27 PROCEDURE — 82043 UR ALBUMIN QUANTITATIVE: CPT

## 2019-03-27 PROCEDURE — 84443 ASSAY THYROID STIM HORMONE: CPT

## 2019-03-29 LAB
EST. AVERAGE GLUCOSE BLD GHB EST-MCNC: 266 MG/DL
HBA1C MFR BLD: 10.9 % (ref 0–5.6)

## 2019-04-15 ENCOUNTER — OFFICE VISIT (OUTPATIENT)
Dept: CARDIOLOGY | Facility: MEDICAL CENTER | Age: 49
End: 2019-04-15
Payer: COMMERCIAL

## 2019-04-15 ENCOUNTER — NON-PROVIDER VISIT (OUTPATIENT)
Dept: CARDIOLOGY | Facility: MEDICAL CENTER | Age: 49
End: 2019-04-15
Payer: COMMERCIAL

## 2019-04-15 VITALS
DIASTOLIC BLOOD PRESSURE: 70 MMHG | HEIGHT: 76 IN | SYSTOLIC BLOOD PRESSURE: 128 MMHG | WEIGHT: 274.6 LBS | BODY MASS INDEX: 33.44 KG/M2 | OXYGEN SATURATION: 97 % | HEART RATE: 96 BPM

## 2019-04-15 DIAGNOSIS — I50.20 ACC/AHA STAGE C SYSTOLIC HEART FAILURE (HCC): ICD-10-CM

## 2019-04-15 DIAGNOSIS — Z95.810 PRESENCE OF BIVENTRICULAR AUTOMATIC CARDIOVERTER/DEFIBRILLATOR (AICD): ICD-10-CM

## 2019-04-15 DIAGNOSIS — I51.89 LEFT VENTRICULAR SYSTOLIC DYSFUNCTION, NYHA CLASS 1: ICD-10-CM

## 2019-04-15 DIAGNOSIS — R06.09 DYSPNEA ON EXERTION: ICD-10-CM

## 2019-04-15 DIAGNOSIS — I10 HTN (HYPERTENSION), MALIGNANT: ICD-10-CM

## 2019-04-15 DIAGNOSIS — G47.19 EXCESSIVE DAYTIME SLEEPINESS: ICD-10-CM

## 2019-04-15 DIAGNOSIS — I50.42 CHRONIC COMBINED SYSTOLIC AND DIASTOLIC HEART FAILURE (HCC): Chronic | ICD-10-CM

## 2019-04-15 DIAGNOSIS — I42.8 NON-ISCHEMIC CARDIOMYOPATHY (HCC): ICD-10-CM

## 2019-04-15 DIAGNOSIS — Z95.810 ICD (IMPLANTABLE CARDIOVERTER-DEFIBRILLATOR) IN PLACE: ICD-10-CM

## 2019-04-15 DIAGNOSIS — I50.22 CHRONIC SYSTOLIC HEART FAILURE (HCC): ICD-10-CM

## 2019-04-15 DIAGNOSIS — I47.20 VT (VENTRICULAR TACHYCARDIA) (HCC): ICD-10-CM

## 2019-04-15 PROCEDURE — 99215 OFFICE O/P EST HI 40 MIN: CPT | Mod: 25 | Performed by: INTERNAL MEDICINE

## 2019-04-15 PROCEDURE — 94618 PULMONARY STRESS TESTING: CPT | Performed by: INTERNAL MEDICINE

## 2019-04-15 PROCEDURE — 93284 PRGRMG EVAL IMPLANTABLE DFB: CPT | Performed by: INTERNAL MEDICINE

## 2019-04-15 RX ORDER — CARVEDILOL 25 MG/1
25 TABLET ORAL 2 TIMES DAILY
Qty: 90 TAB | Refills: 3 | Status: SHIPPED | OUTPATIENT
Start: 2019-04-15 | End: 2019-10-18 | Stop reason: SDUPTHER

## 2019-04-15 RX ORDER — SPIRONOLACTONE 25 MG/1
25 TABLET ORAL DAILY
Qty: 90 TAB | Refills: 3 | Status: SHIPPED | OUTPATIENT
Start: 2019-04-15 | End: 2019-10-18 | Stop reason: SDUPTHER

## 2019-04-15 RX ORDER — LISINOPRIL 40 MG/1
40 TABLET ORAL DAILY
Qty: 90 TAB | Refills: 3 | Status: SHIPPED | OUTPATIENT
Start: 2019-04-15 | End: 2019-10-18 | Stop reason: SDUPTHER

## 2019-04-15 ASSESSMENT — MINNESOTA LIVING WITH HEART FAILURE QUESTIONNAIRE (MLHF)
MAKING YOU FEEL DEPRESSED: 0
DIFFICULTY SOCIALIZING WITH FAMILY OR FRIENDS: 0
FEELING LIKE A BURDEN TO FAMILY AND FRIENDS: 0
SWELLING IN ANKLES OR LEGS: 0
DIFFICULTY SLEEPING WELL AT NIGHT: 1
WORKING AROUND THE HOUSE OR YARD DIFFICULT: 0
DIFFICULTY WITH RECREATIONAL PASTIMES, SPORTS, HOBBIES: 0
MAKING YOU STAY IN A HOSPITAL: 0
DIFFICULTY WITH SEXUAL ACTIVITIES: 2
DIFFICULTY GOING AWAY FROM HOME: 0
TOTAL_SCORE: 9
GIVING YOU SIDE EFFECTS FROM TREATMENTS: 0
TIRED, FATIGUED OR LOW ON ENERGY: 1
WALKING ABOUT OR CLIMBING STAIRS DIFFICULT: 0
MAKING YOU SHORT OF BREATH: 0
COSTING YOU MONEY FOR MEDICAL CARE: 1
LOSS OF SELF CONTROL IN YOUR LIFE: 0
DIFFICULTY WORKING TO EARN A LIVING: 1
HAVING TO SIT OR LIE DOWN DURING THE DAY: 0
EATING LESS FOODS YOU LIKE: 0
DIFFICULTY TO CONCENTRATE OR REMEMBERING THINGS: 1
MAKING YOU WORRY: 2

## 2019-04-15 ASSESSMENT — ENCOUNTER SYMPTOMS
EYE PAIN: 0
DEPRESSION: 0
WEIGHT LOSS: 0
DOUBLE VISION: 0
BLURRED VISION: 0
SPEECH CHANGE: 0
DIZZINESS: 0
MYALGIAS: 0
SHORTNESS OF BREATH: 0
PALPITATIONS: 0
EYE DISCHARGE: 0
ORTHOPNEA: 0
NAUSEA: 0
LOSS OF CONSCIOUSNESS: 0
BLOOD IN STOOL: 0
VOMITING: 0
CLAUDICATION: 0
ABDOMINAL PAIN: 0
SENSORY CHANGE: 0
FALLS: 0
PND: 0
BRUISES/BLEEDS EASILY: 0
HALLUCINATIONS: 0
CHILLS: 0
FEVER: 0
HEADACHES: 0
COUGH: 0

## 2019-04-15 ASSESSMENT — 6 MINUTE WALK TEST (6MWT): TOTAL DISTANCE WALKED (METERS): 415

## 2019-04-15 NOTE — LETTER
[facility]   [address], [facility] [address] [facility] [address]  [place] [address]  Phone: [phone]  Fax: [phone]              Richard Franklin  1970    Encounter Date: 4/15/2019    Jessica Romano M.D.          PROGRESS NOTE:  Chief Complaint   Patient presents with   • CHF (Systolic)     HF Est. lack of energy       Subjective:   Richard Franklin is a 49 y.o. male who presents today For cardiac care and evaluation and management of congestive heart failure. Patient does have a history of cardiomyopathy for many years. It is thought to be related to untreated hypertension. Recently in November 2017, patient was in the hospital because of ventricular tachycardia. Patient had an invasive coronary angiogram at that time which showed nonobstructive CAD with 60% mid LAD lesion without significant hemodynamic evaluated by FFR. Patient underwent biventricular ICD placement for secondary prevention. Currently, he is only paced 80% of the time due to ectopies.     Patient is feeling very well. No dyspnea. He is very active. Able to walk up 2 flights of stairs without problems.    However, fatigued and not sleeping well.     LVEF is still 35%. 04/2018 2018 Patient was able to complete 408 m during his 6 minute walk test. his O2 saturation at baseline was 98% and at the end of the test, the O2 saturation was 97%. he reported 0.5 level of dyspnea on Francesco scale.    04/2019 Patient was able to complete 415 m during his 6 minute walk test. his O2 saturation at baseline was 97% and at the end of the test, the O2 saturation was 96%. he reported 0 level of dyspnea on Francesco scale.     Blood pressure is controlled 128/70.    Past Medical History:   Diagnosis Date   • Chronic combined systolic and diastolic heart failure (HCC)    • Dilated cardiomyopathy (HCC)     rEFof 30-35%   • DM (diabetes mellitus) (HCC)    • History of obesity    • Hyperlipoproteinemia    • Hypertension    •  Hypertriglyceridemia    • Mixed hyperlipidemia    • Palpitations    • PVC (premature ventricular contraction)    • Sleep apnea    • Ventricular tachycardia (HCC)      Past Surgical History:   Procedure Laterality Date   • AICD IMPLANT     • OTHER      THROAT SURGERY: Uvulopalatopharyngoplasty.   • ZZZ CARDIAC CATH       Family History   Problem Relation Age of Onset   • Hypertension Mother      Social History     Social History   • Marital status:      Spouse name: N/A   • Number of children: N/A   • Years of education: N/A     Occupational History   • Not on file.     Social History Main Topics   • Smoking status: Never Smoker   • Smokeless tobacco: Former User   • Alcohol use Yes      Comment: Occasionally   • Drug use: No   • Sexual activity: Not on file     Other Topics Concern   • Not on file     Social History Narrative   • No narrative on file     No Known Allergies  Outpatient Encounter Prescriptions as of 4/15/2019   Medication Sig Dispense Refill   • carvedilol (COREG) 25 MG Tab Take 1 Tab by mouth 2 Times a Day. 90 Tab 3   • lisinopril (PRINIVIL, ZESTRIL) 40 MG tablet Take 1 Tab by mouth every day. 90 Tab 3   • spironolactone (ALDACTONE) 25 MG Tab Take 1 Tab by mouth every day. 90 Tab 3   • SitaGLIPtin-MetFORMIN HCl (JANUMET XR)  MG TABLET SR 24 HR Take 2 Tabs by mouth every day.     • Icosapent Ethyl (VASCEPA) 1 g Cap Take 2 Caps by mouth 2 Times a Day.     • insulin aspart (NOVOLOG) 100 UNIT/ML Solution Inject 30-50 Units as instructed 3 times a day before meals. 30 AM  50 AFTERNOON  30 PM     • [DISCONTINUED] lisinopril (PRINIVIL, ZESTRIL) 40 MG tablet Take 1 Tab by mouth every day. 90 Tab 3   • Insulin Degludec (TRESIBA FLEXTOUCH) 100 UNIT/ML Solution Pen-injector Inject  as instructed.     • sildenafil citrate (VIAGRA) 100 MG tablet Take 1 Tab by mouth as needed for Erectile Dysfunction. 3 Tab 3   • [DISCONTINUED] carvedilol (COREG) 25 MG Tab Take 1 Tab by mouth 2 Times a Day. 60 Tab 11  "  • [DISCONTINUED] spironolactone (ALDACTONE) 25 MG Tab Take 1 Tab by mouth every day. 30 Tab 11     No facility-administered encounter medications on file as of 4/15/2019.      Review of Systems   Constitutional: Positive for malaise/fatigue. Negative for chills, fever and weight loss.   HENT: Negative for ear discharge, ear pain, hearing loss and nosebleeds.    Eyes: Negative for blurred vision, double vision, pain and discharge.   Respiratory: Negative for cough and shortness of breath.    Cardiovascular: Negative for chest pain, palpitations, orthopnea, claudication, leg swelling and PND.   Gastrointestinal: Negative for abdominal pain, blood in stool, melena, nausea and vomiting.   Genitourinary: Negative for dysuria and hematuria.   Musculoskeletal: Negative for falls, joint pain and myalgias.   Skin: Negative for itching and rash.   Neurological: Negative for dizziness, sensory change, speech change, loss of consciousness and headaches.   Endo/Heme/Allergies: Negative for environmental allergies. Does not bruise/bleed easily.   Psychiatric/Behavioral: Negative for depression, hallucinations and suicidal ideas.        Objective:   BP (!) 0/0 (BP Location: Left arm, Patient Position: Sitting, BP Cuff Size: Adult)   Ht 1.93 m (6' 4\")   BMI 33.81 kg/m²      Physical Exam   Constitutional: He is oriented to person, place, and time. No distress.   HENT:   Head: Normocephalic and atraumatic.   Right Ear: External ear normal.   Left Ear: External ear normal.   Eyes: Right eye exhibits no discharge. Left eye exhibits no discharge.   Neck: No JVD present. No thyromegaly present.   Cardiovascular: Normal rate, regular rhythm, normal heart sounds and intact distal pulses.  Exam reveals no gallop and no friction rub.    No murmur heard.  Pulmonary/Chest: Breath sounds normal. No respiratory distress.   Abdominal: Bowel sounds are normal. He exhibits no distension. There is no tenderness.   Musculoskeletal: He exhibits " no edema or tenderness.   Neurological: He is alert and oriented to person, place, and time. No cranial nerve deficit.   Skin: Skin is warm and dry. He is not diaphoretic.   Psychiatric: He has a normal mood and affect. His behavior is normal.   Nursing note and vitals reviewed.      Assessment:     1. ACC/AHA stage C systolic heart failure (HCC)  REFERRAL TO SLEEP STUDIES    carvedilol (COREG) 25 MG Tab    lisinopril (PRINIVIL, ZESTRIL) 40 MG tablet    spironolactone (ALDACTONE) 25 MG Tab   2. Left ventricular systolic dysfunction, NYHA class 1  REFERRAL TO SLEEP STUDIES    carvedilol (COREG) 25 MG Tab    lisinopril (PRINIVIL, ZESTRIL) 40 MG tablet    spironolactone (ALDACTONE) 25 MG Tab   3. Non-ischemic cardiomyopathy (HCC)     4. VT (ventricular tachycardia) (HCC)     5. ICD (implantable cardioverter-defibrillator) in place     6. HTN (hypertension), malignant     7. Chronic systolic heart failure (HCC)     8. Dyspnea on exertion     9. Excessive daytime sleepiness  REFERRAL TO SLEEP STUDIES       Medical Decision Making:  Today's Assessment / Status / Plan:   Today, based on physical examination findings, patient is euvolemic. No JVD, lungs are clear to auscultation, no pitting edema in bilateral lower extremities, no ascites.    Dry weight is 265 lbs.    Cont current medications at current dose as above.    PMD manages hypertriglyceridemia.    I will see patient back in clinic with lab tests and studies results in 6 months.    I thank you for referring patient to our Cardiology Clinic today.          Ravi Mares M.D.  [address] [place] [address]  [place] [id number]  VIA Facsimile: [phone]

## 2019-04-15 NOTE — PROGRESS NOTES
Chief Complaint   Patient presents with   • CHF (Systolic)     HF Est. lack of energy       Subjective:   Richard Franklin is a 49 y.o. male who presents today For cardiac care and evaluation and management of congestive heart failure. Patient does have a history of cardiomyopathy for many years. It is thought to be related to untreated hypertension. Recently in November 2017, patient was in the hospital because of ventricular tachycardia. Patient had an invasive coronary angiogram at that time which showed nonobstructive CAD with 60% mid LAD lesion without significant hemodynamic evaluated by FFR. Patient underwent biventricular ICD placement for secondary prevention. Currently, he is only paced 80% of the time due to ectopies.     Patient is feeling very well. No dyspnea. He is very active. Able to walk up 2 flights of stairs without problems.    However, fatigued and not sleeping well.     LVEF is still 35%. 04/2018 2018 Patient was able to complete 408 m during his 6 minute walk test. his O2 saturation at baseline was 98% and at the end of the test, the O2 saturation was 97%. he reported 0.5 level of dyspnea on Francesco scale.    04/2019 Patient was able to complete 415 m during his 6 minute walk test. his O2 saturation at baseline was 97% and at the end of the test, the O2 saturation was 96%. he reported 0 level of dyspnea on Francesco scale.     Blood pressure is controlled 128/70.    Past Medical History:   Diagnosis Date   • Chronic combined systolic and diastolic heart failure (HCC)    • Dilated cardiomyopathy (HCC)     rEFof 30-35%   • DM (diabetes mellitus) (HCC)    • History of obesity    • Hyperlipoproteinemia    • Hypertension    • Hypertriglyceridemia    • Mixed hyperlipidemia    • Palpitations    • PVC (premature ventricular contraction)    • Sleep apnea    • Ventricular tachycardia (HCC)      Past Surgical History:   Procedure Laterality Date   • AICD IMPLANT     • OTHER      THROAT SURGERY:  Uvulopalatopharyngoplasty.   • ZZZ CARDIAC CATH       Family History   Problem Relation Age of Onset   • Hypertension Mother      Social History     Social History   • Marital status:      Spouse name: N/A   • Number of children: N/A   • Years of education: N/A     Occupational History   • Not on file.     Social History Main Topics   • Smoking status: Never Smoker   • Smokeless tobacco: Former User   • Alcohol use Yes      Comment: Occasionally   • Drug use: No   • Sexual activity: Not on file     Other Topics Concern   • Not on file     Social History Narrative   • No narrative on file     No Known Allergies  Outpatient Encounter Prescriptions as of 4/15/2019   Medication Sig Dispense Refill   • carvedilol (COREG) 25 MG Tab Take 1 Tab by mouth 2 Times a Day. 90 Tab 3   • lisinopril (PRINIVIL, ZESTRIL) 40 MG tablet Take 1 Tab by mouth every day. 90 Tab 3   • spironolactone (ALDACTONE) 25 MG Tab Take 1 Tab by mouth every day. 90 Tab 3   • SitaGLIPtin-MetFORMIN HCl (JANUMET XR)  MG TABLET SR 24 HR Take 2 Tabs by mouth every day.     • Icosapent Ethyl (VASCEPA) 1 g Cap Take 2 Caps by mouth 2 Times a Day.     • insulin aspart (NOVOLOG) 100 UNIT/ML Solution Inject 30-50 Units as instructed 3 times a day before meals. 30 AM  50 AFTERNOON  30 PM     • [DISCONTINUED] lisinopril (PRINIVIL, ZESTRIL) 40 MG tablet Take 1 Tab by mouth every day. 90 Tab 3   • Insulin Degludec (TRESIBA FLEXTOUCH) 100 UNIT/ML Solution Pen-injector Inject  as instructed.     • sildenafil citrate (VIAGRA) 100 MG tablet Take 1 Tab by mouth as needed for Erectile Dysfunction. 3 Tab 3   • [DISCONTINUED] carvedilol (COREG) 25 MG Tab Take 1 Tab by mouth 2 Times a Day. 60 Tab 11   • [DISCONTINUED] spironolactone (ALDACTONE) 25 MG Tab Take 1 Tab by mouth every day. 30 Tab 11     No facility-administered encounter medications on file as of 4/15/2019.      Review of Systems   Constitutional: Positive for malaise/fatigue. Negative for chills,  "fever and weight loss.   HENT: Negative for ear discharge, ear pain, hearing loss and nosebleeds.    Eyes: Negative for blurred vision, double vision, pain and discharge.   Respiratory: Negative for cough and shortness of breath.    Cardiovascular: Negative for chest pain, palpitations, orthopnea, claudication, leg swelling and PND.   Gastrointestinal: Negative for abdominal pain, blood in stool, melena, nausea and vomiting.   Genitourinary: Negative for dysuria and hematuria.   Musculoskeletal: Negative for falls, joint pain and myalgias.   Skin: Negative for itching and rash.   Neurological: Negative for dizziness, sensory change, speech change, loss of consciousness and headaches.   Endo/Heme/Allergies: Negative for environmental allergies. Does not bruise/bleed easily.   Psychiatric/Behavioral: Negative for depression, hallucinations and suicidal ideas.        Objective:   BP (!) 0/0 (BP Location: Left arm, Patient Position: Sitting, BP Cuff Size: Adult)   Ht 1.93 m (6' 4\")   BMI 33.81 kg/m²     Physical Exam   Constitutional: He is oriented to person, place, and time. No distress.   HENT:   Head: Normocephalic and atraumatic.   Right Ear: External ear normal.   Left Ear: External ear normal.   Eyes: Right eye exhibits no discharge. Left eye exhibits no discharge.   Neck: No JVD present. No thyromegaly present.   Cardiovascular: Normal rate, regular rhythm, normal heart sounds and intact distal pulses.  Exam reveals no gallop and no friction rub.    No murmur heard.  Pulmonary/Chest: Breath sounds normal. No respiratory distress.   Abdominal: Bowel sounds are normal. He exhibits no distension. There is no tenderness.   Musculoskeletal: He exhibits no edema or tenderness.   Neurological: He is alert and oriented to person, place, and time. No cranial nerve deficit.   Skin: Skin is warm and dry. He is not diaphoretic.   Psychiatric: He has a normal mood and affect. His behavior is normal.   Nursing note and " vitals reviewed.      Assessment:     1. ACC/AHA stage C systolic heart failure (HCC)  REFERRAL TO SLEEP STUDIES    carvedilol (COREG) 25 MG Tab    lisinopril (PRINIVIL, ZESTRIL) 40 MG tablet    spironolactone (ALDACTONE) 25 MG Tab   2. Left ventricular systolic dysfunction, NYHA class 1  REFERRAL TO SLEEP STUDIES    carvedilol (COREG) 25 MG Tab    lisinopril (PRINIVIL, ZESTRIL) 40 MG tablet    spironolactone (ALDACTONE) 25 MG Tab   3. Non-ischemic cardiomyopathy (HCC)     4. VT (ventricular tachycardia) (HCC)     5. ICD (implantable cardioverter-defibrillator) in place     6. HTN (hypertension), malignant     7. Chronic systolic heart failure (HCC)     8. Dyspnea on exertion     9. Excessive daytime sleepiness  REFERRAL TO SLEEP STUDIES       Medical Decision Making:  Today's Assessment / Status / Plan:   Today, based on physical examination findings, patient is euvolemic. No JVD, lungs are clear to auscultation, no pitting edema in bilateral lower extremities, no ascites.    Dry weight is 265 lbs.    Cont current medications at current dose as above.    PMD manages hypertriglyceridemia.    I will see patient back in clinic with lab tests and studies results in 6 months.    I thank you for referring patient to our Cardiology Clinic today.

## 2019-10-18 ENCOUNTER — OFFICE VISIT (OUTPATIENT)
Dept: CARDIOLOGY | Facility: MEDICAL CENTER | Age: 49
End: 2019-10-18
Payer: COMMERCIAL

## 2019-10-18 ENCOUNTER — NON-PROVIDER VISIT (OUTPATIENT)
Dept: CARDIOLOGY | Facility: MEDICAL CENTER | Age: 49
End: 2019-10-18
Payer: COMMERCIAL

## 2019-10-18 VITALS
DIASTOLIC BLOOD PRESSURE: 70 MMHG | OXYGEN SATURATION: 98 % | SYSTOLIC BLOOD PRESSURE: 100 MMHG | WEIGHT: 266.2 LBS | HEIGHT: 76 IN | HEART RATE: 82 BPM | BODY MASS INDEX: 32.42 KG/M2

## 2019-10-18 DIAGNOSIS — Z95.810 ICD (IMPLANTABLE CARDIOVERTER-DEFIBRILLATOR) IN PLACE: ICD-10-CM

## 2019-10-18 DIAGNOSIS — I47.20 VT (VENTRICULAR TACHYCARDIA) (HCC): ICD-10-CM

## 2019-10-18 DIAGNOSIS — Z95.810 BIVENTRICULAR IMPLANTABLE CARDIOVERTER-DEFIBRILLATOR IN SITU: ICD-10-CM

## 2019-10-18 DIAGNOSIS — I10 HTN (HYPERTENSION), MALIGNANT: ICD-10-CM

## 2019-10-18 DIAGNOSIS — I51.89 LEFT VENTRICULAR SYSTOLIC DYSFUNCTION, NYHA CLASS 1: ICD-10-CM

## 2019-10-18 DIAGNOSIS — Z79.899 HIGH RISK MEDICATION USE: ICD-10-CM

## 2019-10-18 DIAGNOSIS — I42.8 NON-ISCHEMIC CARDIOMYOPATHY (HCC): ICD-10-CM

## 2019-10-18 DIAGNOSIS — I50.20 ACC/AHA STAGE C SYSTOLIC HEART FAILURE (HCC): ICD-10-CM

## 2019-10-18 PROCEDURE — 99214 OFFICE O/P EST MOD 30 MIN: CPT | Performed by: INTERNAL MEDICINE

## 2019-10-18 PROCEDURE — 93284 PRGRMG EVAL IMPLANTABLE DFB: CPT | Performed by: INTERNAL MEDICINE

## 2019-10-18 RX ORDER — ATORVASTATIN CALCIUM 20 MG/1
20 TABLET, FILM COATED ORAL DAILY
Qty: 90 TAB | Refills: 3 | Status: SHIPPED | OUTPATIENT
Start: 2019-10-18 | End: 2020-11-12

## 2019-10-18 RX ORDER — ATORVASTATIN CALCIUM 20 MG/1
20 TABLET, FILM COATED ORAL
Refills: 0 | COMMUNITY
Start: 2019-09-27 | End: 2019-10-18 | Stop reason: SDUPTHER

## 2019-10-18 RX ORDER — DAPAGLIFLOZIN 10 MG/1
1 TABLET, FILM COATED ORAL
Refills: 0 | COMMUNITY
Start: 2019-09-27

## 2019-10-18 RX ORDER — CARVEDILOL 25 MG/1
25 TABLET ORAL 2 TIMES DAILY
Qty: 90 TAB | Refills: 3 | Status: SHIPPED | OUTPATIENT
Start: 2019-10-18 | End: 2020-07-06

## 2019-10-18 RX ORDER — LISINOPRIL 40 MG/1
40 TABLET ORAL DAILY
Qty: 90 TAB | Refills: 3 | Status: SHIPPED | OUTPATIENT
Start: 2019-10-18 | End: 2020-10-21

## 2019-10-18 RX ORDER — SPIRONOLACTONE 25 MG/1
25 TABLET ORAL DAILY
Qty: 90 TAB | Refills: 3 | Status: SHIPPED | OUTPATIENT
Start: 2019-10-18 | End: 2020-12-30

## 2019-10-18 RX ORDER — INSULIN GLARGINE 100 [IU]/ML
INJECTION, SOLUTION SUBCUTANEOUS
Refills: 2 | COMMUNITY
Start: 2019-09-27

## 2019-10-18 ASSESSMENT — ENCOUNTER SYMPTOMS
PALPITATIONS: 0
EYE PAIN: 0
COUGH: 0
DOUBLE VISION: 0
DEPRESSION: 0
CHILLS: 0
CLAUDICATION: 0
ABDOMINAL PAIN: 0
BRUISES/BLEEDS EASILY: 0
LOSS OF CONSCIOUSNESS: 0
FEVER: 0
BLOOD IN STOOL: 0
VOMITING: 0
HEADACHES: 0
NAUSEA: 0
SHORTNESS OF BREATH: 0
HALLUCINATIONS: 0
FALLS: 0
MYALGIAS: 0
BLURRED VISION: 0
WEIGHT LOSS: 0
SENSORY CHANGE: 0
ORTHOPNEA: 0
EYE DISCHARGE: 0
PND: 0
DIZZINESS: 0
SPEECH CHANGE: 0

## 2019-10-18 NOTE — PROGRESS NOTES
Chief Complaint   Patient presents with   • Congestive Heart Failure       Subjective:   Richard Franklin is a 49 y.o. male who presents today for cardiac care and evaluation and management of congestive heart failure. Patient does have a history of cardiomyopathy for many years. It is thought to be related to untreated hypertension. In November 2017, patient was in the hospital because of ventricular tachycardia. Patient had an invasive coronary angiogram at that time which showed nonobstructive CAD with 60% mid LAD lesion without significant hemodynamic evaluated by FFR. Patient underwent biventricular ICD placement for secondary prevention. Currently, he is only paced 80% of the time due to ectopies.     Patient is feeling very well. No dyspnea. He is very active. Able to walk up 2 flights of stairs without problems.     However, fatigued and not sleeping well.     LVEF is still 35%. 04/2018 2018 Patient was able to complete 408 m during his 6 minute walk test. his O2 saturation at baseline was 98% and at the end of the test, the O2 saturation was 97%. he reported 0.5 level of dyspnea on Francesco scale.     04/2019 Patient was able to complete 415 m during his 6 minute walk test. his O2 saturation at baseline was 97% and at the end of the test, the O2 saturation was 96%. he reported 0 level of dyspnea on Francesco scale.     Blood pressure is controlled 100/70.    Past Medical History:   Diagnosis Date   • Chronic combined systolic and diastolic heart failure (HCC)    • Dilated cardiomyopathy (HCC)     rEFof 30-35%   • DM (diabetes mellitus) (HCC)    • History of obesity    • Hyperlipoproteinemia    • Hypertension    • Hypertriglyceridemia    • Mixed hyperlipidemia    • Palpitations    • PVC (premature ventricular contraction)    • Sleep apnea    • Ventricular tachycardia (HCC)      Past Surgical History:   Procedure Laterality Date   • AICD IMPLANT     • OTHER      THROAT SURGERY: Uvulopalatopharyngoplasty.   • ZZZ  CARDIAC CATH       Family History   Problem Relation Age of Onset   • Hypertension Mother      Social History     Socioeconomic History   • Marital status:      Spouse name: Not on file   • Number of children: Not on file   • Years of education: Not on file   • Highest education level: Not on file   Occupational History   • Not on file   Social Needs   • Financial resource strain: Not on file   • Food insecurity:     Worry: Not on file     Inability: Not on file   • Transportation needs:     Medical: Not on file     Non-medical: Not on file   Tobacco Use   • Smoking status: Never Smoker   • Smokeless tobacco: Former User   Substance and Sexual Activity   • Alcohol use: Yes     Comment: Occasionally   • Drug use: No   • Sexual activity: Not on file   Lifestyle   • Physical activity:     Days per week: Not on file     Minutes per session: Not on file   • Stress: Not on file   Relationships   • Social connections:     Talks on phone: Not on file     Gets together: Not on file     Attends Zoroastrianism service: Not on file     Active member of club or organization: Not on file     Attends meetings of clubs or organizations: Not on file     Relationship status: Not on file   • Intimate partner violence:     Fear of current or ex partner: Not on file     Emotionally abused: Not on file     Physically abused: Not on file     Forced sexual activity: Not on file   Other Topics Concern   • Not on file   Social History Narrative   • Not on file     No Known Allergies  Outpatient Encounter Medications as of 10/18/2019   Medication Sig Dispense Refill   • insulin glargine (BASAGLAR KWIKPEN) 100 UNIT/ML injection PEN INJECT 70 UNITS SUBCUTANEOUSLY EVERY NIGHT AT BEDTIME. INCREASE BY 1 UNIT EVERY NIGHT UNTIL GLUCOSE IS LESS THAN 180  2   • FARXIGA 10 MG Tab Take 1 tablet by mouth.  0   • spironolactone (ALDACTONE) 25 MG Tab Take 1 Tab by mouth every day. 90 Tab 3   • carvedilol (COREG) 25 MG Tab Take 1 Tab by mouth 2 Times a  Day. 90 Tab 3   • atorvastatin (LIPITOR) 20 MG Tab Take 1 Tab by mouth every day. 90 Tab 3   • lisinopril (PRINIVIL, ZESTRIL) 40 MG tablet Take 1 Tab by mouth every day. 90 Tab 3   • sildenafil citrate (VIAGRA) 100 MG tablet Take 1 Tab by mouth as needed for Erectile Dysfunction. 3 Tab 3   • SitaGLIPtin-MetFORMIN HCl (JANUMET XR)  MG TABLET SR 24 HR Take 2 Tabs by mouth every day.     • Icosapent Ethyl (VASCEPA) 1 g Cap Take 2 Caps by mouth 2 Times a Day.     • insulin aspart (NOVOLOG) 100 UNIT/ML Solution Inject 30-50 Units as instructed 3 times a day before meals. 30 AM  50 AFTERNOON  30 PM     • [DISCONTINUED] atorvastatin (LIPITOR) 20 MG Tab Take 20 mg by mouth.  0   • [DISCONTINUED] carvedilol (COREG) 25 MG Tab Take 1 Tab by mouth 2 Times a Day. 90 Tab 3   • [DISCONTINUED] lisinopril (PRINIVIL, ZESTRIL) 40 MG tablet Take 1 Tab by mouth every day. 90 Tab 3   • [DISCONTINUED] spironolactone (ALDACTONE) 25 MG Tab Take 1 Tab by mouth every day. 90 Tab 3   • Insulin Degludec (TRESIBA FLEXTOUCH) 100 UNIT/ML Solution Pen-injector Inject  as instructed.       No facility-administered encounter medications on file as of 10/18/2019.      Review of Systems   Constitutional: Negative for chills, fever, malaise/fatigue and weight loss.   HENT: Negative for ear discharge, ear pain, hearing loss and nosebleeds.    Eyes: Negative for blurred vision, double vision, pain and discharge.   Respiratory: Negative for cough and shortness of breath.    Cardiovascular: Negative for chest pain, palpitations, orthopnea, claudication, leg swelling and PND.   Gastrointestinal: Negative for abdominal pain, blood in stool, melena, nausea and vomiting.   Genitourinary: Negative for dysuria and hematuria.   Musculoskeletal: Negative for falls, joint pain and myalgias.   Skin: Negative for itching and rash.   Neurological: Negative for dizziness, sensory change, speech change, loss of consciousness and headaches.   Endo/Heme/Allergies:  "Negative for environmental allergies. Does not bruise/bleed easily.   Psychiatric/Behavioral: Negative for depression, hallucinations and suicidal ideas.        Objective:   /70 (BP Location: Right arm, Patient Position: Sitting, BP Cuff Size: Adult)   Pulse 82   Ht 1.93 m (6' 4\")   Wt 120.7 kg (266 lb 3.2 oz)   SpO2 98%   BMI 32.40 kg/m²     Physical Exam   Constitutional: He is oriented to person, place, and time. No distress.   HENT:   Head: Normocephalic and atraumatic.   Right Ear: External ear normal.   Left Ear: External ear normal.   Eyes: Right eye exhibits no discharge. Left eye exhibits no discharge.   Neck: No JVD present. No thyromegaly present.   Cardiovascular: Normal rate, regular rhythm, normal heart sounds and intact distal pulses. Exam reveals no gallop and no friction rub.   No murmur heard.  Pulmonary/Chest: Breath sounds normal. No respiratory distress.   Abdominal: Bowel sounds are normal. He exhibits no distension. There is no tenderness.   Musculoskeletal: He exhibits no edema or tenderness.   Neurological: He is alert and oriented to person, place, and time. No cranial nerve deficit.   Skin: Skin is warm and dry. He is not diaphoretic.   Psychiatric: He has a normal mood and affect. His behavior is normal.   Nursing note and vitals reviewed.      Assessment:     1. ACC/AHA stage C systolic heart failure (HCC)  spironolactone (ALDACTONE) 25 MG Tab    carvedilol (COREG) 25 MG Tab    lisinopril (PRINIVIL, ZESTRIL) 40 MG tablet   2. Left ventricular systolic dysfunction, NYHA class 1  spironolactone (ALDACTONE) 25 MG Tab    carvedilol (COREG) 25 MG Tab    lisinopril (PRINIVIL, ZESTRIL) 40 MG tablet   3. Non-ischemic cardiomyopathy (HCC)     4. VT (ventricular tachycardia) (HCC)     5. ICD (implantable cardioverter-defibrillator) in place     6. HTN (hypertension), malignant     7. High risk medication use         Medical Decision Making:  Today's Assessment / Status / Plan:   Today, " based on physical examination findings, patient is euvolemic. No JVD, lungs are clear to auscultation, no pitting edema in bilateral lower extremities, no ascites.     Dry weight is 265 lbs.    Continue current medications at current dose as above. Refills were prescribed today and patient was instructed with plan of care.     PMD manages hypertriglyceridemia.     I will see patient back in clinic with lab tests and studies results in 6 months.     I thank you for referring patient to our Cardiology Clinic today.

## 2020-01-09 ENCOUNTER — HOSPITAL ENCOUNTER (OUTPATIENT)
Dept: LAB | Facility: MEDICAL CENTER | Age: 50
End: 2020-01-09
Attending: PHYSICIAN ASSISTANT
Payer: COMMERCIAL

## 2020-01-09 LAB
25(OH)D3 SERPL-MCNC: 16 NG/ML (ref 30–100)
ALBUMIN SERPL BCP-MCNC: 4.2 G/DL (ref 3.2–4.9)
ALBUMIN/GLOB SERPL: 1.6 G/DL
ALP SERPL-CCNC: 56 U/L (ref 30–99)
ALT SERPL-CCNC: 20 U/L (ref 2–50)
ANION GAP SERPL CALC-SCNC: 10 MMOL/L (ref 0–11.9)
AST SERPL-CCNC: 14 U/L (ref 12–45)
BASOPHILS # BLD AUTO: 0.5 % (ref 0–1.8)
BASOPHILS # BLD: 0.07 K/UL (ref 0–0.12)
BILIRUB SERPL-MCNC: 0.6 MG/DL (ref 0.1–1.5)
BUN SERPL-MCNC: 47 MG/DL (ref 8–22)
CALCIUM SERPL-MCNC: 9.1 MG/DL (ref 8.5–10.5)
CHLORIDE SERPL-SCNC: 104 MMOL/L (ref 96–112)
CHOLEST SERPL-MCNC: 163 MG/DL (ref 100–199)
CO2 SERPL-SCNC: 23 MMOL/L (ref 20–33)
CREAT SERPL-MCNC: 1.44 MG/DL (ref 0.5–1.4)
EOSINOPHIL # BLD AUTO: 0.25 K/UL (ref 0–0.51)
EOSINOPHIL NFR BLD: 1.9 % (ref 0–6.9)
ERYTHROCYTE [DISTWIDTH] IN BLOOD BY AUTOMATED COUNT: 44.9 FL (ref 35.9–50)
FASTING STATUS PATIENT QL REPORTED: NORMAL
GLOBULIN SER CALC-MCNC: 2.6 G/DL (ref 1.9–3.5)
GLUCOSE SERPL-MCNC: 290 MG/DL (ref 65–99)
HCT VFR BLD AUTO: 46.4 % (ref 42–52)
HDLC SERPL-MCNC: 28 MG/DL
HGB BLD-MCNC: 15 G/DL (ref 14–18)
IMM GRANULOCYTES # BLD AUTO: 0.09 K/UL (ref 0–0.11)
IMM GRANULOCYTES NFR BLD AUTO: 0.7 % (ref 0–0.9)
LDLC SERPL CALC-MCNC: ABNORMAL MG/DL
LYMPHOCYTES # BLD AUTO: 1.38 K/UL (ref 1–4.8)
LYMPHOCYTES NFR BLD: 10.7 % (ref 22–41)
MCH RBC QN AUTO: 29.8 PG (ref 27–33)
MCHC RBC AUTO-ENTMCNC: 32.3 G/DL (ref 33.7–35.3)
MCV RBC AUTO: 92.2 FL (ref 81.4–97.8)
MONOCYTES # BLD AUTO: 1.05 K/UL (ref 0–0.85)
MONOCYTES NFR BLD AUTO: 8.1 % (ref 0–13.4)
NEUTROPHILS # BLD AUTO: 10.11 K/UL (ref 1.82–7.42)
NEUTROPHILS NFR BLD: 78.1 % (ref 44–72)
NRBC # BLD AUTO: 0 K/UL
NRBC BLD-RTO: 0 /100 WBC
PLATELET # BLD AUTO: 173 K/UL (ref 164–446)
PMV BLD AUTO: 11.6 FL (ref 9–12.9)
POTASSIUM SERPL-SCNC: 4.2 MMOL/L (ref 3.6–5.5)
PROT SERPL-MCNC: 6.8 G/DL (ref 6–8.2)
RBC # BLD AUTO: 5.03 M/UL (ref 4.7–6.1)
SODIUM SERPL-SCNC: 137 MMOL/L (ref 135–145)
TRIGL SERPL-MCNC: 520 MG/DL (ref 0–149)
WBC # BLD AUTO: 13 K/UL (ref 4.8–10.8)

## 2020-01-09 PROCEDURE — 83036 HEMOGLOBIN GLYCOSYLATED A1C: CPT

## 2020-01-09 PROCEDURE — 84439 ASSAY OF FREE THYROXINE: CPT

## 2020-01-09 PROCEDURE — 80053 COMPREHEN METABOLIC PANEL: CPT

## 2020-01-09 PROCEDURE — 85025 COMPLETE CBC W/AUTO DIFF WBC: CPT

## 2020-01-09 PROCEDURE — 82306 VITAMIN D 25 HYDROXY: CPT

## 2020-01-09 PROCEDURE — 36415 COLL VENOUS BLD VENIPUNCTURE: CPT

## 2020-01-09 PROCEDURE — 80061 LIPID PANEL: CPT

## 2020-01-09 PROCEDURE — 84443 ASSAY THYROID STIM HORMONE: CPT

## 2020-01-10 ENCOUNTER — HOSPITAL ENCOUNTER (OUTPATIENT)
Dept: RADIOLOGY | Facility: MEDICAL CENTER | Age: 50
End: 2020-01-10
Attending: PHYSICIAN ASSISTANT
Payer: COMMERCIAL

## 2020-01-10 ENCOUNTER — HOSPITAL ENCOUNTER (OUTPATIENT)
Dept: LAB | Facility: MEDICAL CENTER | Age: 50
End: 2020-01-10
Attending: PHYSICIAN ASSISTANT
Payer: COMMERCIAL

## 2020-01-10 DIAGNOSIS — L03.031 CELLULITIS OF GREAT TOE, RIGHT: ICD-10-CM

## 2020-01-10 DIAGNOSIS — E11.649 UNCONTROLLED TYPE 2 DIABETES MELLITUS WITH HYPOGLYCEMIA, UNSPECIFIED HYPOGLYCEMIA COMA STATUS (HCC): ICD-10-CM

## 2020-01-10 LAB
ALBUMIN SERPL BCP-MCNC: 4.4 G/DL (ref 3.2–4.9)
ALBUMIN/GLOB SERPL: 1.5 G/DL
ALP SERPL-CCNC: 57 U/L (ref 30–99)
ALT SERPL-CCNC: 21 U/L (ref 2–50)
ANION GAP SERPL CALC-SCNC: 11 MMOL/L (ref 0–11.9)
AST SERPL-CCNC: 15 U/L (ref 12–45)
BASOPHILS # BLD AUTO: 0.5 % (ref 0–1.8)
BASOPHILS # BLD: 0.06 K/UL (ref 0–0.12)
BILIRUB SERPL-MCNC: 0.9 MG/DL (ref 0.1–1.5)
BUN SERPL-MCNC: 36 MG/DL (ref 8–22)
CALCIUM SERPL-MCNC: 9.5 MG/DL (ref 8.5–10.5)
CHLORIDE SERPL-SCNC: 103 MMOL/L (ref 96–112)
CO2 SERPL-SCNC: 22 MMOL/L (ref 20–33)
CREAT SERPL-MCNC: 1.31 MG/DL (ref 0.5–1.4)
CRP SERPL HS-MCNC: 3.93 MG/DL (ref 0–0.75)
EOSINOPHIL # BLD AUTO: 0.2 K/UL (ref 0–0.51)
EOSINOPHIL NFR BLD: 1.5 % (ref 0–6.9)
ERYTHROCYTE [DISTWIDTH] IN BLOOD BY AUTOMATED COUNT: 44.5 FL (ref 35.9–50)
ERYTHROCYTE [SEDIMENTATION RATE] IN BLOOD BY WESTERGREN METHOD: 13 MM/HOUR (ref 0–15)
EST. AVERAGE GLUCOSE BLD GHB EST-MCNC: 286 MG/DL
GLOBULIN SER CALC-MCNC: 2.9 G/DL (ref 1.9–3.5)
GLUCOSE SERPL-MCNC: 381 MG/DL (ref 65–99)
HBA1C MFR BLD: 11.6 % (ref 0–5.6)
HCT VFR BLD AUTO: 44 % (ref 42–52)
HGB BLD-MCNC: 14.5 G/DL (ref 14–18)
IMM GRANULOCYTES # BLD AUTO: 0.07 K/UL (ref 0–0.11)
IMM GRANULOCYTES NFR BLD AUTO: 0.5 % (ref 0–0.9)
LYMPHOCYTES # BLD AUTO: 1.22 K/UL (ref 1–4.8)
LYMPHOCYTES NFR BLD: 9.3 % (ref 22–41)
MCH RBC QN AUTO: 30 PG (ref 27–33)
MCHC RBC AUTO-ENTMCNC: 33 G/DL (ref 33.7–35.3)
MCV RBC AUTO: 91.1 FL (ref 81.4–97.8)
MONOCYTES # BLD AUTO: 1.15 K/UL (ref 0–0.85)
MONOCYTES NFR BLD AUTO: 8.8 % (ref 0–13.4)
NEUTROPHILS # BLD AUTO: 10.39 K/UL (ref 1.82–7.42)
NEUTROPHILS NFR BLD: 79.4 % (ref 44–72)
NRBC # BLD AUTO: 0 K/UL
NRBC BLD-RTO: 0 /100 WBC
PLATELET # BLD AUTO: 169 K/UL (ref 164–446)
PMV BLD AUTO: 10.8 FL (ref 9–12.9)
POTASSIUM SERPL-SCNC: 5.6 MMOL/L (ref 3.6–5.5)
PROT SERPL-MCNC: 7.3 G/DL (ref 6–8.2)
RBC # BLD AUTO: 4.83 M/UL (ref 4.7–6.1)
SODIUM SERPL-SCNC: 136 MMOL/L (ref 135–145)
T4 FREE SERPL-MCNC: 1.01 NG/DL (ref 0.53–1.43)
TSH SERPL DL<=0.005 MIU/L-ACNC: 0.98 UIU/ML (ref 0.38–5.33)
WBC # BLD AUTO: 13.1 K/UL (ref 4.8–10.8)

## 2020-01-10 PROCEDURE — 80053 COMPREHEN METABOLIC PANEL: CPT

## 2020-01-10 PROCEDURE — 86140 C-REACTIVE PROTEIN: CPT

## 2020-01-10 PROCEDURE — 85025 COMPLETE CBC W/AUTO DIFF WBC: CPT

## 2020-01-10 PROCEDURE — 85652 RBC SED RATE AUTOMATED: CPT

## 2020-01-10 PROCEDURE — 73630 X-RAY EXAM OF FOOT: CPT | Mod: RT

## 2020-01-10 PROCEDURE — 36415 COLL VENOUS BLD VENIPUNCTURE: CPT

## 2020-01-27 ENCOUNTER — NON-PROVIDER VISIT (OUTPATIENT)
Dept: WOUND CARE | Facility: MEDICAL CENTER | Age: 50
End: 2020-01-27
Attending: PHYSICIAN ASSISTANT
Payer: COMMERCIAL

## 2020-01-27 PROCEDURE — 99211 OFF/OP EST MAY X REQ PHY/QHP: CPT

## 2020-01-27 PROCEDURE — 97597 DBRDMT OPN WND 1ST 20 CM/<: CPT

## 2020-01-27 RX ORDER — INSULIN ASPART 100 [IU]/ML
INJECTION, SOLUTION INTRAVENOUS; SUBCUTANEOUS
COMMUNITY
Start: 2020-01-03

## 2020-01-27 RX ORDER — CEPHALEXIN 500 MG/1
CAPSULE ORAL
COMMUNITY
Start: 2020-01-21 | End: 2020-02-06

## 2020-01-27 RX ORDER — CLINDAMYCIN HYDROCHLORIDE 150 MG/1
CAPSULE ORAL
COMMUNITY
Start: 2020-01-10 | End: 2020-02-06

## 2020-01-27 NOTE — CERTIFICATION
Non Provider Encounter- Diabetic Foot Ulcer      HISTORY OF PRESENT ILLNESS  Wound History:    START OF CARE IN CLINIC: 1/27/20    REFERRING PROVIDER: HESHAM Mcneil   WOUND- Diabetic foot ulcer   LOCATION: Right foot 1st MTH medial aspect   HISTORY: Diabetic patient with CHF and blood sugars that run 180-300s is evaluated for this right foot 1st MTH medial aspect wounds. He states they first appeared as a blister in early January 2020 from wearing new shoes. He stopped wearing them and has been wearing tennis shoes with an open hole area at the site to attempt to alleviate pressure. He works in a [employer] and is on his feet most the day; he is unable to wear open toed offloading shoe.     Pertinent Medical History:   Past Medical History:   Diagnosis Date   • Chronic combined systolic and diastolic heart failure (HCC)    • Dilated cardiomyopathy (HCC)     rEFof 30-35%   • DM (diabetes mellitus) (HCC)    • History of obesity    • Hyperlipoproteinemia    • Hypertension    • Hypertriglyceridemia    • Mixed hyperlipidemia    • Palpitations    • PVC (premature ventricular contraction)    • Sleep apnea    • Ventricular tachycardia (HCC)        DIABETES HX: Diagnosed with type 2 diabetes, and is currently managing with insulin.  Checks blood sugars daily and reports that these typically run around over 180-300s.  Has not had previous diabetes education.  Does have numbness in feet with completely insensate bilaterally to a 5.07 monofilament test.  Usually wears tennis shoes and has never had custom footwear or seen an orthotist. Does not check feet routinely and only has become aware of problems when he has seen blood on the floor at home.  Has had previous foot ulcers with blistering but no foot surgery.       TOBACCO USE: non-smoker, denies recreational drug use, rarely drinks alcohol.  with 3 kids.     OFFLOADING: none    Pertinent Labs and Diagnostics:    Labs:     A1c:   Lab Results   Component Value  "Date/Time    HBA1C 11.6 (H) 2020 06:37 AM          IMAGIN/10/20 x-ray: \"No x-ray evidence of osteomyelitis. Arthritic change affecting the mid foot suggesting early Charcot joint\"    VASCULAR STUDIES: none- 2+ strong dorsalis pedis & posterior tibial pulse to right foot    LAST  WOUND CULTURE:  DATE : none on file           FALL RISK ASSESSMENT-    65 years or older     Fall within the last 2 years   Uses ambulatory devices  xLoss of protective sensation in feet   Use of prostethic/orthotic    Presence of lower extremity/foot/toe amputation   xTaking medication that increases risk (per facility policy)    Interventions Recommended (if any of the above are selected):   Use of Assistive Device:   Supervision with ambulation: Caregiver   Assistance with ambulation: Caregiver   Home safety education: Educational material provided    PAST MEDICAL HISTORY:   Past Medical History:   Diagnosis Date   • Chronic combined systolic and diastolic heart failure (HCC)    • Dilated cardiomyopathy (HCC)     rEFof 30-35%   • DM (diabetes mellitus) (HCC)    • History of obesity    • Hyperlipoproteinemia    • Hypertension    • Hypertriglyceridemia    • Mixed hyperlipidemia    • Palpitations    • PVC (premature ventricular contraction)    • Sleep apnea    • Ventricular tachycardia (HCC)        PAST SURGICAL HISTORY:   Past Surgical History:   Procedure Laterality Date   • AICD IMPLANT     • OTHER      THROAT SURGERY: Uvulopalatopharyngoplasty.   • ZZZ CARDIAC CATH          MEDICATIONS:   Current Outpatient Medications   Medication   • NOVOLOG FLEXPEN 100 UNIT/ML solution for injection   • cephALEXin (KEFLEX) 500 MG Cap   • FARXIGA 10 MG Tab   • spironolactone (ALDACTONE) 25 MG Tab   • carvedilol (COREG) 25 MG Tab   • atorvastatin (LIPITOR) 20 MG Tab   • lisinopril (PRINIVIL, ZESTRIL) 40 MG tablet   • SitaGLIPtin-MetFORMIN HCl (JANUMET XR)  MG TABLET SR 24 HR   • Icosapent Ethyl (VASCEPA) 1 g Cap   • insulin aspart " (NOVOLOG) 100 UNIT/ML Solution   • clindamycin (CLEOCIN) 150 MG Cap   • insulin glargine (BASAGLAR KWIKPEN) 100 UNIT/ML injection PEN   • Insulin Degludec (TRESIBA FLEXTOUCH) 100 UNIT/ML Solution Pen-injector   • sildenafil citrate (VIAGRA) 100 MG tablet     No current facility-administered medications for this visit.        ALLERGIES:  No Known Allergies      SOCIAL HISTORY:   Social History     Socioeconomic History   • Marital status:      Spouse name: Not on file   • Number of children: Not on file   • Years of education: Not on file   • Highest education level: Not on file   Occupational History   • Not on file   Social Needs   • Financial resource strain: Not on file   • Food insecurity:     Worry: Not on file     Inability: Not on file   • Transportation needs:     Medical: Not on file     Non-medical: Not on file   Tobacco Use   • Smoking status: Never Smoker   • Smokeless tobacco: Former User   Substance and Sexual Activity   • Alcohol use: Yes     Comment: Occasionally   • Drug use: No   • Sexual activity: Not on file   Lifestyle   • Physical activity:     Days per week: Not on file     Minutes per session: Not on file   • Stress: Not on file   Relationships   • Social connections:     Talks on phone: Not on file     Gets together: Not on file     Attends Yarsani service: Not on file     Active member of club or organization: Not on file     Attends meetings of clubs or organizations: Not on file     Relationship status: Not on file   • Intimate partner violence:     Fear of current or ex partner: Not on file     Emotionally abused: Not on file     Physically abused: Not on file     Forced sexual activity: Not on file   Other Topics Concern   • Not on file   Social History Narrative   • Not on file       FAMILY HISTORY:   Family History   Problem Relation Age of Onset   • Hypertension Mother        WOUND ASSESSMENT-   Wound 01/27/20 Diabetic Ulcer Foot Right Medial 1st MTH foot proximal (Active)      Site Assessment Yellow 1/27/2020  9:00 AM   Leela-wound Assessment Callused 1/27/2020  9:00 AM   Margins Attached edges 1/27/2020  9:00 AM   Post Wound Length (cm) 0.6 cm 1/27/2020  9:00 AM    Post Wound Width (cm) 0.1 cm 1/27/2020  9:00 AM   Post Wound Depth (cm) 0.1 cm 1/27/2020  9:00 AM   Post Wound Surface Area (cm^2) 0.06 cm^2 1/27/2020  9:00 AM   Tunneling 0 cm 1/27/2020  9:00 AM   Undermining 0 cm 1/27/2020  9:00 AM   Closure Open to air 1/27/2020  9:00 AM   Drainage Amount None 1/27/2020  9:00 AM   Non-staged Wound Description Full thickness 1/27/2020  9:00 AM   Treatments Cleansed 1/27/2020  9:00 AM   Cleansing Approved Wound Cleanser 1/27/2020  9:00 AM   Periwound Protectant Skin Protectant wipes to Periwound 1/27/2020  9:00 AM   Dressing Options Hypafix Tape;Hydrofera Blue Ready;Collagen Dressing 1/27/2020  9:00 AM   Dressing Cleansing/Solutions Other (Comments) 1/27/2020  9:00 AM   Dressing Changed Changed 1/27/2020  9:00 AM   Dressing Status Clean;Dry;Intact 1/27/2020  9:00 AM   Dressing Change Frequency Every 72 hrs 1/27/2020  9:00 AM   WOUND NURSE ONLY - Odor None 1/27/2020  9:00 AM   WOUND NURSE ONLY - Pulses 2+;Right;DP;PT 1/27/2020  9:00 AM   WOUND NURSE ONLY - Exposed Structures None 1/27/2020  9:00 AM   WOUND NURSE ONLY - Tissue Type and Percentage 100% yellow pre debridement 1/27/2020  9:00 AM       Wound 01/27/20 Foot Right medial 1st MTH foot distal (Active)   Wound Image    1/27/2020  9:00 AM   Site Assessment Yellow;Red;Pink 1/27/2020  9:00 AM   Leela-wound Assessment Callused;Maceration 1/27/2020  9:00 AM   Margins Attached edges;Defined edges 1/27/2020  9:00 AM   Post Wound Length (cm) 3.3 cm 1/27/2020  9:00 AM    Post Wound Width (cm) 1 cm 1/27/2020  9:00 AM   Post Wound Depth (cm) 0.1 cm 1/27/2020  9:00 AM   Post Wound Surface Area (cm^2) 3.3 cm^2 1/27/2020  9:00 AM   Tunneling 0 cm 1/27/2020  9:00 AM   Undermining 0 cm 1/27/2020  9:00 AM   Closure Open to air 1/27/2020  9:00 AM    Drainage Amount Small 1/27/2020  9:00 AM   Drainage Description Serosanguineous 1/27/2020  9:00 AM   Non-staged Wound Description Full thickness 1/27/2020  9:00 AM   Treatments Cleansed 1/27/2020  9:00 AM   Cleansing Approved Wound Cleanser 1/27/2020  9:00 AM   Periwound Protectant Skin Protectant wipes to Periwound 1/27/2020  9:00 AM   Dressing Options Collagen Dressing;Hydrofera Blue Ready;Hypafix Tape 1/27/2020  9:00 AM   Dressing Cleansing/Solutions Other (Comments) 1/27/2020  9:00 AM   Dressing Changed Changed 1/27/2020  9:00 AM   Dressing Status Clean;Dry;Intact 1/27/2020  9:00 AM   Dressing Change Frequency Every 72 hrs 1/27/2020  9:00 AM   WOUND NURSE ONLY - Odor None 1/27/2020  9:00 AM   WOUND NURSE ONLY - Pulses Right;2+;DP;PT 1/27/2020  9:00 AM   WOUND NURSE ONLY - Exposed Structures None 1/27/2020  9:00 AM   WOUND NURSE ONLY - Tissue Type and Percentage 80% yellow, 20% red & pink at edges pre debridement 1/27/2020  9:00 AM      PATIENT EDUCATION  - Importance of tight glucose control for wound healing   - Implications of loss of protective sensation (LOPS) discussed with patient- including increased risk for amputation.  - Advised to check feet at least daily, moisturize feet, and to always wear protective foot wear.   -  Importance of offloading foot to assist with wound healing  - Advised pt not to trim nails or calluses, seek foot/nail care from podiatrist or certified foot/nail nurse  - Importance of adequate nutrition for wound healing    Patient is open to the idea of seeing LPS and was placed for follow-up appointment with DORIS Chandra; case discussed with Venkata. Consider referral to endocrinologist; patient's PCP currently manages his blood sugars, however patient has had difficulty financially affording medications.

## 2020-01-27 NOTE — PATIENT INSTRUCTIONS
Should you experience any significant changes in your wound(s) such as infection (redness, swelling, localized heat, increased pain, fever >101 F, chills) or have any questions regarding your home care instructions, please contact the wound center [phone]. If after hours, contact your primary care physician or go the hospital emergency room.  Keep dressing clean and dry and cover while bathing. Only change dressing if over saturated, soiled or its falling off or every 3 days; you can shower prior to changing your dressing and just wash out the wound area as the last area on your body.     1) Rinse wound with water & pat dry with clean gauze.  2) Apply skin prep to periwound around wound on intact skin, let dry a minute.  3) Apply moistened Epiona collagen dressing with Purayn gel & place inside wound bed.  4) Apply strip of Hydrofera blue over wound bed with writing/shiny side away from body.  5) Secure with tape.    Wash hands before & after procedure. Clean scissors with boiling water or alcohol.   Keep close management of your blood sugars for wound healing; consider an endocrinologist referral from your PCP.  Shake out shoes before donning; to wear protective footwear at all times, including when at home; to examine feet daily for any new redness/wounds and report to PCP; have a [facility] or podiatrist trim your toenails & care for your calloses after wound healing.

## 2020-01-27 NOTE — PROCEDURES
CSWD with scissors & forceps to remove callous around periwound and over proximal wound edges, as well as scalpel to distal wound to remove non-viable slough layer of ~ 6cm2 total area.

## 2020-02-05 ENCOUNTER — TELEPHONE (OUTPATIENT)
Dept: CARDIOLOGY | Facility: MEDICAL CENTER | Age: 50
End: 2020-02-05

## 2020-02-05 NOTE — TELEPHONE ENCOUNTER
D/w TT, form filled out with his recommendations and signed. Faxed to [phone]. Receipt confirmed. Form to scanning.

## 2020-02-05 NOTE — TELEPHONE ENCOUNTER
Received urgent clearance request from [facility] for single tooth extraction and possible full mouth teeth extraction with local anesthesia containing epinephrine. Procedure currently scheduled for 2/6/20. Form should be faxed to [phone] when complete.     To ANGELA

## 2020-02-06 ENCOUNTER — OFFICE VISIT (OUTPATIENT)
Dept: WOUND CARE | Facility: MEDICAL CENTER | Age: 50
End: 2020-02-06
Attending: PHYSICIAN ASSISTANT
Payer: COMMERCIAL

## 2020-02-06 VITALS
SYSTOLIC BLOOD PRESSURE: 113 MMHG | OXYGEN SATURATION: 95 % | HEART RATE: 77 BPM | RESPIRATION RATE: 18 BRPM | TEMPERATURE: 97.1 F | DIASTOLIC BLOOD PRESSURE: 65 MMHG

## 2020-02-06 DIAGNOSIS — E11.65 UNCONTROLLED TYPE 2 DIABETES MELLITUS WITH HYPERGLYCEMIA (HCC): ICD-10-CM

## 2020-02-06 DIAGNOSIS — L97.412 DIABETIC ULCER OF RIGHT MIDFOOT ASSOCIATED WITH DIABETES MELLITUS DUE TO UNDERLYING CONDITION, WITH FAT LAYER EXPOSED (HCC): ICD-10-CM

## 2020-02-06 DIAGNOSIS — E11.610 CHARCOT FOOT DUE TO DIABETES MELLITUS (HCC): ICD-10-CM

## 2020-02-06 DIAGNOSIS — E11.42 DIABETIC POLYNEUROPATHY ASSOCIATED WITH TYPE 2 DIABETES MELLITUS (HCC): ICD-10-CM

## 2020-02-06 DIAGNOSIS — E08.621 DIABETIC ULCER OF RIGHT MIDFOOT ASSOCIATED WITH DIABETES MELLITUS DUE TO UNDERLYING CONDITION, WITH FAT LAYER EXPOSED (HCC): ICD-10-CM

## 2020-02-06 PROCEDURE — 99213 OFFICE O/P EST LOW 20 MIN: CPT | Mod: 25 | Performed by: NURSE PRACTITIONER

## 2020-02-06 PROCEDURE — 11042 DBRDMT SUBQ TIS 1ST 20SQCM/<: CPT | Performed by: NURSE PRACTITIONER

## 2020-02-06 PROCEDURE — 11042 DBRDMT SUBQ TIS 1ST 20SQCM/<: CPT

## 2020-02-06 ASSESSMENT — PAIN SCALES - GENERAL: PAINLEVEL: NO PAIN

## 2020-02-06 ASSESSMENT — ENCOUNTER SYMPTOMS
NERVOUS/ANXIOUS: 0
CHILLS: 0
CLAUDICATION: 0
NAUSEA: 0
DIARRHEA: 0
SHORTNESS OF BREATH: 0
BACK PAIN: 1
VOMITING: 0
FEVER: 0
CONSTIPATION: 0
DEPRESSION: 0
COUGH: 0

## 2020-02-06 NOTE — PATIENT INSTRUCTIONS
Should you experience any significant changes in your wound(s) such as infection (redness, swelling, localized heat, increased pain, fever >101 F, chills) or have any questions regarding your home care instructions, please contact the wound center [phone]. If after hours, contact your primary care physician or go the hospital emergency room.  Keep dressing clean and dry and cover while bathing. Only change dressing if over saturated, soiled or its falling off.       Go to [employer] ASAP for offloading boot. Wear it at all times when weight bearing on that foot.

## 2020-02-06 NOTE — PROGRESS NOTES
Provider Encounter- Diabetic Foot Ulcer      HISTORY OF PRESENT ILLNESS  Wound History:          START OF CARE IN CLINIC: 1/27/20          REFERRING PROVIDER: HESHAM Mcneil         WOUND- Diabetic foot ulcer         LOCATION: Medial right first MTH           HISTORY: Patient referred to the wound clinic for evaluation and treatment of a right medial first MTH ulcer.  This wound started as a blister in early January 2020 after he wore a pair of ill fitting shoes.  The blister eventually popped, leaving him with an open wound.  He has since stopped wearing the shoes that cause the wound, and has been wearing tennis shoes that he has modified by cutting a hole into the shoe over the site of the wound.  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  He is not allowed to wear any type of open toed shoe      Pertinent Medical History:  Uncontrolled diabetes mellitus type 2, CHF, diabetic neuropathy        DIABETES HX: Diagnosed with type 2 diabetes in 2005, and is currently managing with insulin.  Checks blood sugars 3 times per day and reports that these typically run around 180-300s.  Has not had previous diabetes education, nor does he have an endocrinologist.  Does have numbness in both feet. Usually wears tennis shoes and has never had custom footwear or seen an orthotist. Does not check feet routinely.  Has had previous foot ulcers with blistering but no foot surgery.        TOBACCO USE: non-smoker, denies recreational drug use, rarely drinks alcohol.  with 3 kids.            OFFLOADING: none      Patient's problem list, allergies, and current medications reviewed and updated in Epic    Interval History:  2/6/2020: Initial provider visit with DORIS Vail.  Patient states he is feeling well, denies fevers, chills, nausea, vomiting.  His blood sugar today was around 160.  He has had to change the dressing to his foot twice since he was last seen in the clinic.  He states that he  can wear an offloading boot to his job, as long as his toes are covered.      REVIEW OF SYSTEMS:   Review of Systems   Constitutional: Negative for chills and fever.   Respiratory: Negative for cough and shortness of breath.    Cardiovascular: Positive for leg swelling. Negative for chest pain and claudication.        Legs swell a little bit sometimes   Gastrointestinal: Negative for constipation, diarrhea, nausea and vomiting.   Musculoskeletal: Positive for back pain.        Chronic back pain, has had surgery in the past   Neurological:        Numbness in feet and hands   Psychiatric/Behavioral: Negative for depression. The patient is not nervous/anxious.        PHYSICAL EXAMINATION:   /65   Pulse 77   Temp 36.2 °C (97.1 °F)   Resp 18   SpO2 95%     Physical Exam   Constitutional: He is oriented to person, place, and time and well-developed, well-nourished, and in no distress.   HENT:   Head: Normocephalic.   Eyes: Pupils are equal, round, and reactive to light.   Cardiovascular: Intact distal pulses.   Pulses triphasic by Doppler   Pulmonary/Chest: Effort normal.   Musculoskeletal: Normal range of motion.         General: Edema present.      Comments: Localized edema around foot wound  Bilateral hallux valgus  Right forefoot Charcot   Neurological: He is alert and oriented to person, place, and time.   2/6/2020: Monofilament testing in clinic  Patient sensed 0/10 bilaterally   Skin: Skin is warm.   Diabetic ulcer to right medial first MTH, Wegener's grade 1  Refer to wound flowsheet and photos   Psychiatric: Mood, memory, affect and judgment normal.       WOUND ASSESSMENT   Wound 01/27/20 Diabetic Ulcer Foot Right Medial 1st MTH foot proximal (Active)   Wound Image   2/6/2020  8:00 AM   Site Assessment Dry 2/6/2020  8:00 AM   Leela-wound Assessment Callused 2/6/2020  8:00 AM   Post Wound Length (cm) 0.6 cm 1/27/2020  9:00 AM    Post Wound Width (cm) 0.1 cm 1/27/2020  9:00 AM   Post Wound Depth (cm) 0.1  cm 1/27/2020  9:00 AM   Post Wound Surface Area (cm^2) 0.06 cm^2 1/27/2020  9:00 AM   Tunneling 0 cm 1/27/2020  9:00 AM   Undermining 0 cm 1/27/2020  9:00 AM   Closure Open to air 2/6/2020  8:00 AM   Drainage Amount None 2/6/2020  8:00 AM   Treatments Cleansed 1/27/2020  9:00 AM   Dressing Changed Changed 1/27/2020  9:00 AM   WOUND NURSE ONLY - Odor None 1/27/2020  9:00 AM   WOUND NURSE ONLY - Pulses 2+;Right;DP;PT 1/27/2020  9:00 AM   WOUND NURSE ONLY - Exposed Structures None 1/27/2020  9:00 AM   WOUND NURSE ONLY - Tissue Type and Percentage 100% yellow pre debridement 1/27/2020  9:00 AM       Wound 01/27/20 Foot Right medial 1st MTH foot distal (Active)   Wound Image    2/6/2020  8:00 AM   Site Assessment Yellow;Red;Pink 2/6/2020  8:00 AM   Leela-wound Assessment Callused;Dry 2/6/2020  8:00 AM   Margins Attached edges;Defined edges 2/6/2020  8:00 AM   Wound Length (cm) 2.9 cm 2/6/2020  8:00 AM   Wound Width (cm) 0.6 cm 2/6/2020  8:00 AM   Wound Depth (cm) 0.1 cm 2/6/2020  8:00 AM   Wound Surface Area (cm^2) 1.74 cm^2 2/6/2020  8:00 AM   Post Wound Length (cm) 3 cm 2/6/2020  8:00 AM    Post Wound Width (cm) 0.6 cm 2/6/2020  8:00 AM   Post Wound Depth (cm) 0.2 cm 2/6/2020  8:00 AM   Post Wound Surface Area (cm^2) 1.8 cm^2 2/6/2020  8:00 AM   Tunneling 0 cm 2/6/2020  8:00 AM   Undermining 0 cm 2/6/2020  8:00 AM   Closure Open to air 2/6/2020  8:00 AM   Drainage Amount Small 2/6/2020  8:00 AM   Drainage Description Serosanguineous 2/6/2020  8:00 AM   Non-staged Wound Description Full thickness 2/6/2020  8:00 AM   Treatments Cleansed 2/6/2020  8:00 AM   Cleansing Approved Wound Cleanser 2/6/2020  8:00 AM   Periwound Protectant Skin Protectant wipes to Periwound 2/6/2020  8:00 AM   Dressing Options Collagen Dressing;Hydrofera Blue Ready;Hypafix Tape 2/6/2020  8:00 AM   Dressing Cleansing/Solutions Normal Saline 2/6/2020  8:00 AM   Dressing Changed Changed 2/6/2020  8:00 AM   Dressing Status Clean;Dry;Intact 2/6/2020   8:00 AM   Dressing Change Frequency Every 72 hrs 2/6/2020  8:00 AM   WOUND NURSE ONLY - Odor None 2/6/2020  8:00 AM   WOUND NURSE ONLY - Pulses Right;2+;DP;PT 1/27/2020  9:00 AM   WOUND NURSE ONLY - Exposed Structures None 2/6/2020  8:00 AM   WOUND NURSE ONLY - Tissue Type and Percentage pre - 70% red viable, 30% yellow non viable; post debridement - 100% red viable tissue 2/6/2020  8:00 AM        PROCEDURE: Excisional debridement of right medial first MTH ulcer  -2% viscous lidocaine applied topically to wound bed for approximately 5 minutes prior to debridement  -Curette used to debride wound bed and periwound callus.  Excisional debridement was performed to remove devitalized tissue until healthy, bleeding tissue was visualized.   Entire surface of wound, 1.8 cm2 debrided.  Tissue debrided into the subcutaneous layer.  Periwound callus, ~1.0 cm2, debrided to skin level, excising hyperkeratinized tissue.   -Bleeding controlled with manual pressure.    -Wound care completed by wound RN, refer to flowsheet  -Patient tolerated the procedure well, without c/o pain or discomfort.       Pertinent Labs and Diagnostics:    Labs:     A1c:   Lab Results   Component Value Date/Time    HBA1C 11.6 (H) 01/09/2020 06:37 AM          IMAGING:                             Results for orders placed during the hospital encounter of 01/10/20   DX-FOOT-COMPLETE 3+ RIGHT    Impression No x-ray evidence of osteomyelitis.    Arthritic change affecting the mid foot suggesting early Charcot joint.    Old healed fractures of the fifth metatarsal base.                                                                     VASCULAR STUDIES: None found in epic    LAST  WOUND CULTURE:  None found in epic             ASSESSMENT AND PLAN:   1. Diabetic ulcer of right midfoot associated with diabetes mellitus due to underlying condition, with fat layer exposed (HCC)  Comments: Ulcers right medial first MTH, started as a blister from ill fitting shoes  in January 2020.  X-ray done on 1/10/2020- for OM    2/6/2020: Initial provider visit.  Wound area has not decreased significantly since last clinic visit.  -Excisional debridement of wound in clinic today, medically necessary to promote wound healing.  -Patient to return to clinic weekly for assessment and debridement  -Patient to change dressing 1-2 times per week in between clinic visits  -Rx for offloading boot provided    2. Diabetic polyneuropathy associated with type 2 diabetes mellitus (HCC)  Comments: Monofilament testing in clinic on 2/6/2020 indicate significant loss of protective sensation    2/6/2020:   Implications of loss of protective sensation (LOPS) discussed with patient- including increased risk for amputation.  Advised to check feet at least daily, moisturize feet, and to always wear protective foot wear.     3. Uncontrolled type 2 diabetes mellitus with hyperglycemia ([facility])  Comments: A1c from 1/9/2020 11.6.    2/6/2020: Patient states his blood sugar today was around 160.  He has not had diabetes education, nor has he been referred to endocrinology previously.  -Referral to diabetes education  -Referral to endocrinology    4. Charcot foot due to diabetes mellitus (HCC)  Comments: Early Charcot changes to right forefoot    2/6/2020:   -Implications of Charcot changes, and combination with uncontrolled diabetes and neuropathy discussed with patient  -Advised that he will need diabetic shoes and inserts once his wound is resolved      PATIENT EDUCATION  - Importance of tight glucose control for wound healing   - Implications of loss of protective sensation (LOPS) discussed with patient- including increased risk for amputation.  - Advised to check feet at least daily, moisturize feet, and to always wear protective foot wear.   -  Importance of offloading foot to assist with wound healing  - Advised pt not to trim nails or calluses, seek foot/nail care from podiatrist or certified foot/nail nurse  -  Importance of adequate nutrition for wound healing    15 min spent face to face with patient, >50% of time spent counseling, coordinating care, reviewing records, discussing POC, educating patient regarding wound healing and progressions, diabetes education.  This time was spent in excess to procedure time.       Please note that this note may have been created using voice recognition software. I have worked with technical experts from [facility] to optimize the interface.  I have made every reasonable attempt to correct obvious errors, but there may be errors of grammar and possibly content that I did not discover before finalizing the note.    N

## 2020-02-13 ENCOUNTER — OFFICE VISIT (OUTPATIENT)
Dept: WOUND CARE | Facility: MEDICAL CENTER | Age: 50
End: 2020-02-13
Attending: PHYSICIAN ASSISTANT
Payer: COMMERCIAL

## 2020-02-13 VITALS
DIASTOLIC BLOOD PRESSURE: 92 MMHG | SYSTOLIC BLOOD PRESSURE: 148 MMHG | HEART RATE: 88 BPM | RESPIRATION RATE: 18 BRPM | OXYGEN SATURATION: 96 % | TEMPERATURE: 97.3 F

## 2020-02-13 DIAGNOSIS — E11.65 UNCONTROLLED TYPE 2 DIABETES MELLITUS WITH HYPERGLYCEMIA (HCC): ICD-10-CM

## 2020-02-13 DIAGNOSIS — E11.42 DIABETIC POLYNEUROPATHY ASSOCIATED WITH TYPE 2 DIABETES MELLITUS (HCC): ICD-10-CM

## 2020-02-13 DIAGNOSIS — E11.610 CHARCOT FOOT DUE TO DIABETES MELLITUS (HCC): ICD-10-CM

## 2020-02-13 DIAGNOSIS — E08.621 DIABETIC ULCER OF RIGHT MIDFOOT ASSOCIATED WITH DIABETES MELLITUS DUE TO UNDERLYING CONDITION, WITH FAT LAYER EXPOSED (HCC): ICD-10-CM

## 2020-02-13 DIAGNOSIS — L97.412 DIABETIC ULCER OF RIGHT MIDFOOT ASSOCIATED WITH DIABETES MELLITUS DUE TO UNDERLYING CONDITION, WITH FAT LAYER EXPOSED (HCC): ICD-10-CM

## 2020-02-13 PROCEDURE — 11042 DBRDMT SUBQ TIS 1ST 20SQCM/<: CPT

## 2020-02-13 PROCEDURE — 11042 DBRDMT SUBQ TIS 1ST 20SQCM/<: CPT | Performed by: NURSE PRACTITIONER

## 2020-02-13 RX ORDER — HYDROCODONE BITARTRATE AND ACETAMINOPHEN 5; 325 MG/1; MG/1
TABLET ORAL
Status: ON HOLD | COMMUNITY
Start: 2020-02-04 | End: 2020-05-12

## 2020-02-13 ASSESSMENT — ENCOUNTER SYMPTOMS
FEVER: 0
DIARRHEA: 0
VOMITING: 0
NAUSEA: 0
CHILLS: 0
CLAUDICATION: 0
DEPRESSION: 0
COUGH: 0
BACK PAIN: 1
CONSTIPATION: 0
SHORTNESS OF BREATH: 0
NERVOUS/ANXIOUS: 0

## 2020-02-13 ASSESSMENT — PAIN SCALES - GENERAL: PAINLEVEL: NO PAIN

## 2020-02-13 NOTE — PROGRESS NOTES
Provider Encounter- Diabetic Foot Ulcer      HISTORY OF PRESENT ILLNESS  Wound History:          START OF CARE IN CLINIC: 1/27/20          REFERRING PROVIDER: HESHAM Mcneil         WOUND- Diabetic foot ulcer         LOCATION: Medial right first MTH           HISTORY: Patient referred to the wound clinic for evaluation and treatment of a right medial first MTH ulcer.  This wound started as a blister in early January 2020 after he wore a pair of ill fitting shoes.  The blister eventually popped, leaving him with an open wound.  He has since stopped wearing the shoes that cause the wound, and has been wearing tennis shoes that he has modified by cutting a hole into the shoe over the site of the wound.  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  He is not allowed to wear any type of open toed shoe      Pertinent Medical History:  Uncontrolled diabetes mellitus type 2, CHF, diabetic neuropathy        DIABETES HX: Diagnosed with type 2 diabetes in 2005, and is currently managing with insulin.  Checks blood sugars 3 times per day and reports that these typically run around 180-300s.  Has not had previous diabetes education, nor does he have an endocrinologist.  Does have numbness in both feet. Usually wears tennis shoes and has never had custom footwear or seen an orthotist. Does not check feet routinely.  Has had previous foot ulcers with blistering but no foot surgery.        TOBACCO USE:  Never smoked.  Former smokeless tobacco user, for approximately 13 years, quit in 2010            OFFLOADING: none      Patient's problem list, allergies, and current medications reviewed and updated in Epic    Interval History:  2/6/2020: Initial provider visit with DORIS Vail.  Patient states he is feeling well, denies fevers, chills, nausea, vomiting.  His blood sugar today was around 160.  He has had to change the dressing to his foot twice since he was last seen in the clinic.  He states that he  can wear an offloading boot to his job, as long as his toes are covered.    2/13/2020 : Clinic visit with DORIS Vail.  Richard is feeling well today, denies fevers, chills, nausea, vomiting.  Reports that his blood sugar yesterday was 150.  He did obtain an offloading boot, however was unable to afford the even up for the other side, and therefore has not been wearing it due to back issues.  Continues to wear his regular shoe.      REVIEW OF SYSTEMS:   Review of Systems   Constitutional: Negative for chills and fever.   Respiratory: Negative for cough and shortness of breath.    Cardiovascular: Positive for leg swelling. Negative for chest pain and claudication.        Legs swell a little bit sometimes   Gastrointestinal: Negative for constipation, diarrhea, nausea and vomiting.   Musculoskeletal: Positive for back pain.        Chronic back pain, has had surgery in the past   Neurological:        Numbness in feet and hands   Psychiatric/Behavioral: Negative for depression. The patient is not nervous/anxious.        PHYSICAL EXAMINATION:   /92   Pulse 88   Temp 36.3 °C (97.3 °F)   Resp 18   SpO2 96%     Physical Exam   Constitutional: He is oriented to person, place, and time and well-developed, well-nourished, and in no distress.   HENT:   Head: Normocephalic.   Eyes: Pupils are equal, round, and reactive to light.   Cardiovascular: Intact distal pulses.   Pulses triphasic by Doppler   Pulmonary/Chest: Effort normal.   Musculoskeletal: Normal range of motion.         General: Edema present.      Comments: Localized edema around foot wound  Bilateral hallux valgus  Right forefoot Charcot   Neurological: He is alert and oriented to person, place, and time.   2/6/2020: Monofilament testing in clinic  Patient sensed 0/10 bilaterally   Skin: Skin is warm.   Diabetic ulcer to right medial first MTH, Wegener's grade 1  Refer to wound flowsheet and photos   Psychiatric: Mood, memory, affect and judgment  normal.       WOUND ASSESSMENT    Wound 01/27/20 Foot Right medial 1st MTH foot distal (Active)   Wound Image    2/13/2020  8:00 AM   Site Assessment Pink;Red 2/13/2020  8:00 AM   Periwound Assessment Callused;Dry 2/13/2020  8:00 AM   Margins Attached edges;Defined edges 2/13/2020  8:00 AM   Wound Length (cm) 2.1 cm 2/13/2020  8:00 AM   Wound Width (cm) 0.4 cm 2/13/2020  8:00 AM   Wound Depth (cm) 0.1 cm 2/13/2020  8:00 AM   Wound Surface Area (cm^2) 0.84 cm^2 2/13/2020  8:00 AM   Wound Volume (cm^3) 0.08 cm^3 2/13/2020  8:00 AM   Post-Procedure Length (cm) 2.1 cm 2/13/2020  8:00 AM   Post-Procedure Width (cm) 0.5 cm 2/13/2020  8:00 AM   Post-Procedure Depth (cm) 0.1 cm 2/13/2020  8:00 AM   Post-Procedure Surface Area (cm^2) 1.05 cm^2 2/13/2020  8:00 AM   Post-Procedure Volume (cm^3) 0.1 cm^3 2/13/2020  8:00 AM   Post Wound Length (cm) 3 cm 2/6/2020  8:00 AM    Post Wound Width (cm) 0.6 cm 2/6/2020  8:00 AM   Post Wound Depth (cm) 0.2 cm 2/6/2020  8:00 AM   Post Wound Surface Area (cm^2) 1.8 cm^2 2/6/2020  8:00 AM   Tunneling (cm) 0 cm 2/6/2020  8:00 AM   Undermining (cm) 0 cm 2/6/2020  8:00 AM   Closure Open to air 2/6/2020  8:00 AM   Drainage Amount Small 2/13/2020  8:00 AM   Drainage Description Serosanguineous 2/13/2020  8:00 AM   Non-staged Wound Description Full thickness 2/13/2020  8:00 AM   Treatments Cleansed;Other (Comment) 2/13/2020  8:00 AM   Wound Cleansing Normal Saline Irrigation 2/13/2020  8:00 AM   Periwound Protectant Skin Protectant Wipes to Periwound 2/13/2020  8:00 AM   Dressing Options Collagen Dressing;Hydrofera Blue Ready;Hypafix Tape 2/13/2020  8:00 AM   Dressing Cleansing/Solutions Normal Saline 2/6/2020  8:00 AM   Dressing Changed Changed 2/6/2020  8:00 AM   Dressing Status Clean;Dry;Intact 2/6/2020  8:00 AM   Dressing Change/Treatment Frequency Every 72 hrs 2/6/2020  8:00 AM   Wound Odor None 2/13/2020  8:00 AM   Pulses Right;2+;DP;PT 1/27/2020  9:00 AM   Exposed Structures None 2/13/2020   8:00 AM   WOUND NURSE ONLY - Tissue Type and Percentage Pre: 100% pink/red viable 2/13/2020  8:00 AM         Debridement   Wound 01/27/20 Foot Right medial 1st MTH foot distal     Consent obtained? verbal  Consent given by: patient  Risks discussed? procedural risks not discussed  Performed by: NP  Pain control: lidocaine 2%  Post-debridement measurements  Length (cm): 2.1  Width (cm): 0.5  Depth (cm): 0.1  Percent debrided: 100%  Surface Area (cm^2): 1.05  Area debrided (cm^2): 1.05  Volume (cm^3): 0.11  Tissue and other material debrided: subcutaneous tissue  Devitalized tissue debrided: callus  Instrument(s) utilized: scalpel  Hemostasis obtained with: pressure  Response to treatment: procedure was tolerated well           Pertinent Labs and Diagnostics:    Labs:     A1c:   Lab Results   Component Value Date/Time    HBA1C 11.6 (H) 01/09/2020 06:37 AM          IMAGING:                             Results for orders placed during the hospital encounter of 01/10/20   DX-FOOT-COMPLETE 3+ RIGHT    Impression No x-ray evidence of osteomyelitis.    Arthritic change affecting the mid foot suggesting early Charcot joint.    Old healed fractures of the fifth metatarsal base.                                                                     VASCULAR STUDIES: None found in epic    LAST  WOUND CULTURE:  None found in epic             ASSESSMENT AND PLAN:   1. Diabetic ulcer of right midfoot associated with diabetes mellitus due to underlying condition, with fat layer exposed (HCC)  Comments: Ulcers right medial first MTH, started as a blister from ill fitting shoes in January 2020.  X-ray done on 1/10/2020- for OM    2/13/2020: Wound area has decreased significantly.  He has not been able to wear his offloading boot, as he is not able to afford the even up for the other side.  -Excisional debridement of wound in clinic today, medically necessary to promote wound healing.  -Patient to return to clinic weekly for assessment  and debridement  -Patient to change dressing 1-2 times per week in between clinic visits  -Advised patient to hold off on purchasing even up, as his wound is healing and he may not need it.  -We will need Rx for orthotic shoes and inserts once wound is healed.    Wound care: Collagen to accelerate granulation, Hydrofera Blue to manage exudate and bioburden, foam cover dressing, Hypafix tape    2. Diabetic polyneuropathy associated with type 2 diabetes mellitus ([facility])  Comments: Monofilament testing in clinic on 2/6/2020 indicate significant loss of protective sensation    2/13/2020:  -Previously discussed implications of loss of protective sensation (LOPS) discussed with patient- including increased risk for amputation.  Advised to check feet at least daily, moisturize feet, and to always wear protective foot wear.  -We will require diabetic shoes and inserts once his wound is healed    3. Uncontrolled type 2 diabetes mellitus with hyperglycemia ([facility])  Comments: A1c from 1/9/2020 11.6.    2/13/2020: Patient states his blood sugar today was around 160.  He has not had diabetes education, nor has he been referred to endocrinology previously.  -Referral to diabetes education  -Referral to endocrinology    4. Charcot foot due to diabetes mellitus ([facility])  Comments: Early Charcot changes to right forefoot    2/13/2020:   -Implications of Charcot changes, and combination with uncontrolled diabetes and neuropathy discussed with patient  -Advised that he will need diabetic shoes and inserts once his wound is resolved      PATIENT EDUCATION  - Importance of tight glucose control for wound healing   - Implications of loss of protective sensation (LOPS) discussed with patient- including increased risk for amputation.  - Advised to check feet at least daily, moisturize feet, and to always wear protective foot wear.   -  Importance of offloading foot to assist with wound healing  - Advised pt not to trim nails or calluses, seek  foot/nail care from podiatrist or certified foot/nail nurse  - Importance of adequate nutrition for wound healing          Please note that this note may have been created using voice recognition software. I have worked with technical experts from [facility] to optimize the interface.  I have made every reasonable attempt to correct obvious errors, but there may be errors of grammar and possibly content that I did not discover before finalizing the note.    N

## 2020-02-13 NOTE — PATIENT INSTRUCTIONS
Keep dressing clean and dry and cover while bathing. Only change dressing if over saturated, soiled or its falling off.     Should you experience any significant changes in your wound(s) such as infection (redness, swelling, localized heat, increased pain, fever >101 F, chills) or have any questions regarding your home care instructions, please contact the wound center [phone]. If after hours, contact your primary care physician or go the hospital emergency room.

## 2020-02-20 ENCOUNTER — OFFICE VISIT (OUTPATIENT)
Dept: WOUND CARE | Facility: MEDICAL CENTER | Age: 50
End: 2020-02-20
Attending: PHYSICIAN ASSISTANT
Payer: COMMERCIAL

## 2020-02-20 VITALS
DIASTOLIC BLOOD PRESSURE: 76 MMHG | HEART RATE: 85 BPM | TEMPERATURE: 97.1 F | OXYGEN SATURATION: 94 % | RESPIRATION RATE: 20 BRPM | SYSTOLIC BLOOD PRESSURE: 130 MMHG

## 2020-02-20 DIAGNOSIS — E08.621 DIABETIC ULCER OF RIGHT MIDFOOT ASSOCIATED WITH DIABETES MELLITUS DUE TO UNDERLYING CONDITION, WITH FAT LAYER EXPOSED (HCC): ICD-10-CM

## 2020-02-20 DIAGNOSIS — E11.42 DIABETIC POLYNEUROPATHY ASSOCIATED WITH TYPE 2 DIABETES MELLITUS (HCC): ICD-10-CM

## 2020-02-20 DIAGNOSIS — E11.65 UNCONTROLLED TYPE 2 DIABETES MELLITUS WITH HYPERGLYCEMIA (HCC): ICD-10-CM

## 2020-02-20 DIAGNOSIS — E11.610 CHARCOT FOOT DUE TO DIABETES MELLITUS (HCC): ICD-10-CM

## 2020-02-20 DIAGNOSIS — L97.412 DIABETIC ULCER OF RIGHT MIDFOOT ASSOCIATED WITH DIABETES MELLITUS DUE TO UNDERLYING CONDITION, WITH FAT LAYER EXPOSED (HCC): ICD-10-CM

## 2020-02-20 PROCEDURE — 11055 PARING/CUTG B9 HYPRKER LES 1: CPT

## 2020-02-20 PROCEDURE — 11055 PARING/CUTG B9 HYPRKER LES 1: CPT | Performed by: NURSE PRACTITIONER

## 2020-02-20 ASSESSMENT — ENCOUNTER SYMPTOMS
FEVER: 0
NAUSEA: 0
SHORTNESS OF BREATH: 0
CHILLS: 0
NERVOUS/ANXIOUS: 0
DEPRESSION: 0
HEADACHES: 0
DIZZINESS: 0
BACK PAIN: 1
COUGH: 0
VOMITING: 0

## 2020-02-20 ASSESSMENT — PAIN SCALES - GENERAL: PAINLEVEL: NO PAIN

## 2020-02-20 NOTE — PATIENT INSTRUCTIONS
Should you experience any significant changes in your wound(s) such as infection (redness, swelling, localized heat, increased pain, fever >101 F, chills) or have any questions regarding your home care instructions, please contact the wound center [phone]. If after hours, contact your primary care physician or go the hospital emergency room.  Keep dressing clean and dry and cover while bathing. Only change dressing if over saturated, soiled or its falling off.

## 2020-02-20 NOTE — PROGRESS NOTES
Provider Encounter- Diabetic Foot Ulcer      HISTORY OF PRESENT ILLNESS  Wound History:          START OF CARE IN CLINIC: 1/27/20          REFERRING PROVIDER: HESHAM Mcneil         WOUND- Diabetic foot ulcer         LOCATION: Medial right first MTH           HISTORY: Patient referred to the wound clinic for evaluation and treatment of a right medial first MTH ulcer.  This wound started as a blister in early January 2020 after he wore a pair of ill fitting shoes.  The blister eventually popped, leaving him with an open wound.  He has since stopped wearing the shoes that cause the wound, and has been wearing tennis shoes that he has modified by cutting a hole into the shoe over the site of the wound.  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  He is not allowed to wear any type of open toed shoe      Pertinent Medical History:  Uncontrolled diabetes mellitus type 2, CHF, diabetic neuropathy        DIABETES HX: Diagnosed with type 2 diabetes in 2005, and is currently managing with insulin.  Checks blood sugars 3 times per day and reports that these typically run around 180-300s.  Has not had previous diabetes education, nor does he have an endocrinologist.  Does have numbness in both feet. Usually wears tennis shoes and has never had custom footwear or seen an orthotist. Does not check feet routinely.  Has had previous foot ulcers with blistering but no foot surgery.        TOBACCO USE:  Never smoked.  Former smokeless tobacco user, for approximately 13 years, quit in 2010            OFFLOADING: none      Patient's problem list, allergies, and current medications reviewed and updated in Epic    Interval History:  2/6/2020: Initial provider visit with DORIS Vail.  Patient states he is feeling well, denies fevers, chills, nausea, vomiting.  His blood sugar today was around 160.  He has had to change the dressing to his foot twice since he was last seen in the clinic.  He states that he  "can wear an offloading boot to his job, as long as his toes are covered.    2/13/2020 : Clinic visit with DORIS Vail.  Richard is feeling well today, denies fevers, chills, nausea, vomiting.  Reports that his blood sugar yesterday was 150.  He did obtain an offloading boot, however was unable to afford the even up for the other side, and therefore has not been wearing it due to back issues.  Continues to wear his regular shoe.    2/20/2020: Clinic visit with DORIS Garcia. Richard reports feeling well today. Blood sugar 150 today. Reports last week he had a crash in his sugars and ate girl  cookies to get his sugar back up. He received referrals for endocrinology and diabetic education last week but states he had not received a call from either service. He continues to use his regular shoes since he cannot afford even up to wear an orthopedic boot. He states that he has Charcot deformities to bilateral feel, has felt and heard his feet \"pop\", and has been having bilateral foot pain. Denies fever, chills, n/v/d.      REVIEW OF SYSTEMS:   Review of Systems   Constitutional: Negative for chills and fever.   Respiratory: Negative for cough and shortness of breath.    Cardiovascular: Negative for chest pain.        Legs swell a little bit sometimes   Gastrointestinal: Negative for nausea and vomiting.   Musculoskeletal: Positive for back pain.        Chronic back pain, has had surgery in the past  Bilateral Charcot deformity and foot pain  Right hip pain.      Neurological: Negative for dizziness and headaches.        Numbness in bilateral feet   Psychiatric/Behavioral: Negative for depression. The patient is not nervous/anxious.        PHYSICAL EXAMINATION:   /76   Pulse 85   Temp 36.2 °C (97.1 °F)   Resp 20   SpO2 94%     Physical Exam   Constitutional: He is oriented to person, place, and time and well-developed, well-nourished, and in no distress.   HENT:   Head: Normocephalic.   Cardiovascular: " Intact distal pulses.   Pulmonary/Chest: Effort normal. No respiratory distress.   Musculoskeletal: Normal range of motion.         General: Deformity present.      Comments: Bilateral charcot foot deformities  Left ankle inversion   Neurological: He is alert and oriented to person, place, and time.   Insensate to bilateral feet   Skin: Skin is warm and dry. No rash noted. He is not diaphoretic. No erythema.   Callus and healed wound to right medial MTH  See wound flowsheet for further assessment.    Psychiatric: Mood, memory, affect and judgment normal.       WOUND ASSESSMENT         Wound 01/27/20 Foot Right medial 1st MTH foot distal (Active)   Wound Image    2/20/2020  8:00 AM   Site Assessment Pink 2/20/2020  8:00 AM   Periwound Assessment Callused;Dry 2/20/2020  8:00 AM   Margins Attached edges;Defined edges 2/20/2020  8:00 AM   Wound Length (cm) 0.7 cm 2/20/2020  8:00 AM   Wound Width (cm) 0.2 cm 2/20/2020  8:00 AM   Wound Depth (cm) 0 cm 2/20/2020  8:00 AM   Wound Surface Area (cm^2) 0.14 cm^2 2/20/2020  8:00 AM   Wound Volume (cm^3) 0 cm^3 2/20/2020  8:00 AM   Post-Procedure Length (cm) 0 cm 2/20/2020  8:00 AM   Post-Procedure Width (cm) 0 cm 2/20/2020  8:00 AM   Post-Procedure Depth (cm) 0 cm 2/20/2020  8:00 AM   Post-Procedure Surface Area (cm^2) 0 cm^2 2/20/2020  8:00 AM   Post-Procedure Volume (cm^3) 0 cm^3 2/20/2020  8:00 AM   Post Wound Length (cm) 3 cm 2/6/2020  8:00 AM    Post Wound Width (cm) 0.6 cm 2/6/2020  8:00 AM   Post Wound Depth (cm) 0.2 cm 2/6/2020  8:00 AM   Post Wound Surface Area (cm^2) 1.8 cm^2 2/6/2020  8:00 AM   Tunneling (cm) 0 cm 2/20/2020  8:00 AM   Undermining (cm) 0 cm 2/20/2020  8:00 AM   Closure Open to air 2/6/2020  8:00 AM   Drainage Amount Small 2/20/2020  8:00 AM   Drainage Description Serosanguineous 2/20/2020  8:00 AM   Non-staged Wound Description Partial thickness 2/20/2020  8:00 AM   Treatments Cleansed 2/20/2020  8:00 AM   Wound Cleansing Normal Saline Irrigation  2/20/2020  8:00 AM   Periwound Protectant Skin Protectant Wipes to Periwound;Barrier Paste 2/20/2020  8:00 AM   Dressing Options Hypafix Tape;Nonadhesive Foam 2/20/2020  8:00 AM   Dressing Cleansing/Solutions Not Applicable 2/20/2020  8:00 AM   Dressing Changed Changed 2/20/2020  8:00 AM   Dressing Status Clean;Dry;Intact 2/20/2020  8:00 AM   Dressing Change/Treatment Frequency As Needed 2/20/2020  8:00 AM   Wound Odor None 2/20/2020  8:00 AM   Pulses 2+ 2/20/2020  8:00 AM   Exposed Structures None 2/20/2020  8:00 AM   WOUND NURSE ONLY - Tissue Type and Percentage 100% pink pre debridement; resolved post debridement 2/20/2020  8:00 AM       PROCEDURE    Right medial first MTH Pre-debridement photo        -Curette, Scalpel, and forceps used to debride periwound callus.  Excisional debridement was performed to remove devitalized tissue until healthy tissue was visualized.   Entire surface of wound debrided.  Tissue debrided to the epidermal layer.  Periwound callus,14cm2 pre debridement, 0cm2 post debridment, debrided to skin level, excising hyperkeratinized tissue.  -No bleeding with procedure  -Wound care completed per orders, by wound care RN.     Right medial first MTH Post debridement photo          Pertinent Labs and Diagnostics:    Labs:     A1c:   Lab Results   Component Value Date/Time    HBA1C 11.6 (H) 01/09/2020 06:37 AM          IMAGING:                             Results for orders placed during the hospital encounter of 01/10/20   DX-FOOT-COMPLETE 3+ RIGHT    Impression No x-ray evidence of osteomyelitis.    Arthritic change affecting the mid foot suggesting early Charcot joint.    Old healed fractures of the fifth metatarsal base.                                                                     VASCULAR STUDIES: None found in epic    LAST  WOUND CULTURE:  None found in epic             ASSESSMENT AND PLAN:   1. Diabetic ulcer of right midfoot associated with diabetes mellitus due to underlying  "condition, with fat layer exposed ([facility])  Comments: Ulcers right medial first MTH, started as a blister from ill fitting shoes in January 2020.  X-ray done on 1/10/2020- for OM    2/20/2020: Wound resolved. Callus to right medial 1st MTH debrided. Patient continues to wear non-prescriptive shoes.     -Referral to foot nurse provided to ongoing callus care.   -Rx for diabetic shoes and inserts through  provided.   -Discharge from [facility]  -Patient to return to clinic ASAP if wound recurs, or if he develops new wounds.      2. Diabetic polyneuropathy associated with type 2 diabetes mellitus ([facility])  Comments: Monofilament testing in clinic on 2/6/2020 indicate significant loss of protective sensation      2/20/2020: Discussed implications of loss of protective sensation (LOPS) discussed with patient- including increased risk for amputation.  Advised to check feet at least daily, moisturize feet, and to always wear protective foot wear.    -Rx for diabetic shoes and inserts through  provided.     3. Uncontrolled type 2 diabetes mellitus with hyperglycemia ([facility])  Comments: A1c from 1/9/2020 11.6.    2/20/2020: Patient reports his blood sugar 150 today. He states that last week his blodo sugar crashed and he had to eat girl  cookies to get it back up. He was previously referred to endocrinology and diabetic education. Per epic, he has been authorized.    -Contact information for endocrinology and diabetic education provided to patient. He is encouraged to call and schedule appointments.     4. Charcot foot due to diabetes mellitus ([facility])  Comments: Early Charcot changes to right forefoot    2/20/2020: Patient reports has felt and heard his feet \"pop\" and has been having bilateral foot pain. Left ankle inversion noted. No redness or swelling noted.     -Referral to orthopedics placed.   -Rx for diabetic shoes and inserts through  provided.   -Discussed signs and symptoms of acute charcot with patient and to " seek emergent care should s/s arise.       PATIENT EDUCATION  - Importance of tight glucose control for wound healing   - Implications of loss of protective sensation (LOPS) discussed with patient- including increased risk for amputation.  - Advised to check feet at least daily, moisturize feet, and to always wear protective foot wear.   -  Importance of offloading foot to assist with wound healing  - Advised pt not to trim nails or calluses, seek foot/nail care from podiatrist or certified foot/nail nurse  - Importance of adequate nutrition for wound healing          Please note that this note may have been created using voice recognition software. I have worked with technical experts from [employer] to optimize the interface.  I have made every reasonable attempt to correct obvious errors, but there may be errors of grammar and possibly content that I did not discover before finalizing the note.    N

## 2020-02-27 ENCOUNTER — APPOINTMENT (OUTPATIENT)
Dept: WOUND CARE | Facility: MEDICAL CENTER | Age: 50
End: 2020-02-27
Attending: PHYSICIAN ASSISTANT
Payer: COMMERCIAL

## 2020-02-27 DIAGNOSIS — E08.621 DIABETIC ULCER OF RIGHT MIDFOOT ASSOCIATED WITH DIABETES MELLITUS DUE TO UNDERLYING CONDITION, WITH FAT LAYER EXPOSED (HCC): Primary | ICD-10-CM

## 2020-02-27 DIAGNOSIS — L97.412 DIABETIC ULCER OF RIGHT MIDFOOT ASSOCIATED WITH DIABETES MELLITUS DUE TO UNDERLYING CONDITION, WITH FAT LAYER EXPOSED (HCC): Primary | ICD-10-CM

## 2020-05-11 ENCOUNTER — HOSPITAL ENCOUNTER (EMERGENCY)
Facility: MEDICAL CENTER | Age: 50
End: 2020-05-11
Attending: EMERGENCY MEDICINE
Payer: COMMERCIAL

## 2020-05-11 VITALS
DIASTOLIC BLOOD PRESSURE: 63 MMHG | TEMPERATURE: 97.7 F | BODY MASS INDEX: 32.32 KG/M2 | OXYGEN SATURATION: 95 % | HEART RATE: 76 BPM | SYSTOLIC BLOOD PRESSURE: 122 MMHG | HEIGHT: 76 IN | WEIGHT: 265.43 LBS | RESPIRATION RATE: 16 BRPM

## 2020-05-11 DIAGNOSIS — L03.119 CELLULITIS OF HAND: ICD-10-CM

## 2020-05-11 LAB
ALBUMIN SERPL BCP-MCNC: 4.1 G/DL (ref 3.2–4.9)
ALBUMIN/GLOB SERPL: 1.3 G/DL
ALP SERPL-CCNC: 51 U/L (ref 30–99)
ALT SERPL-CCNC: 16 U/L (ref 2–50)
ANION GAP SERPL CALC-SCNC: 14 MMOL/L (ref 7–16)
AST SERPL-CCNC: 13 U/L (ref 12–45)
BASOPHILS # BLD AUTO: 0.4 % (ref 0–1.8)
BASOPHILS # BLD: 0.04 K/UL (ref 0–0.12)
BILIRUB SERPL-MCNC: 0.9 MG/DL (ref 0.1–1.5)
BUN SERPL-MCNC: 29 MG/DL (ref 8–22)
CALCIUM SERPL-MCNC: 8.8 MG/DL (ref 8.4–10.2)
CHLORIDE SERPL-SCNC: 104 MMOL/L (ref 96–112)
CO2 SERPL-SCNC: 22 MMOL/L (ref 20–33)
CREAT SERPL-MCNC: 1.27 MG/DL (ref 0.5–1.4)
CRP SERPL HS-MCNC: 4.49 MG/DL (ref 0–0.75)
EOSINOPHIL # BLD AUTO: 0.17 K/UL (ref 0–0.51)
EOSINOPHIL NFR BLD: 1.5 % (ref 0–6.9)
ERYTHROCYTE [DISTWIDTH] IN BLOOD BY AUTOMATED COUNT: 44.2 FL (ref 35.9–50)
ERYTHROCYTE [SEDIMENTATION RATE] IN BLOOD BY WESTERGREN METHOD: 13 MM/HOUR (ref 0–15)
GLOBULIN SER CALC-MCNC: 3.1 G/DL (ref 1.9–3.5)
GLUCOSE SERPL-MCNC: 329 MG/DL (ref 65–99)
HCT VFR BLD AUTO: 43.3 % (ref 42–52)
HGB BLD-MCNC: 14.4 G/DL (ref 14–18)
IMM GRANULOCYTES # BLD AUTO: 0.06 K/UL (ref 0–0.11)
IMM GRANULOCYTES NFR BLD AUTO: 0.5 % (ref 0–0.9)
LYMPHOCYTES # BLD AUTO: 0.9 K/UL (ref 1–4.8)
LYMPHOCYTES NFR BLD: 8.1 % (ref 22–41)
MCH RBC QN AUTO: 29.6 PG (ref 27–33)
MCHC RBC AUTO-ENTMCNC: 33.3 G/DL (ref 33.7–35.3)
MCV RBC AUTO: 89.1 FL (ref 81.4–97.8)
MONOCYTES # BLD AUTO: 1.08 K/UL (ref 0–0.85)
MONOCYTES NFR BLD AUTO: 9.8 % (ref 0–13.4)
NEUTROPHILS # BLD AUTO: 8.8 K/UL (ref 1.82–7.42)
NEUTROPHILS NFR BLD: 79.7 % (ref 44–72)
NRBC # BLD AUTO: 0 K/UL
NRBC BLD-RTO: 0 /100 WBC
PLATELET # BLD AUTO: 149 K/UL (ref 164–446)
PMV BLD AUTO: 10 FL (ref 9–12.9)
POTASSIUM SERPL-SCNC: 4.6 MMOL/L (ref 3.6–5.5)
PROT SERPL-MCNC: 7.2 G/DL (ref 6–8.2)
RBC # BLD AUTO: 4.86 M/UL (ref 4.7–6.1)
SODIUM SERPL-SCNC: 140 MMOL/L (ref 135–145)
WBC # BLD AUTO: 11.1 K/UL (ref 4.8–10.8)

## 2020-05-11 PROCEDURE — 86140 C-REACTIVE PROTEIN: CPT

## 2020-05-11 PROCEDURE — 700111 HCHG RX REV CODE 636 W/ 250 OVERRIDE (IP): Performed by: EMERGENCY MEDICINE

## 2020-05-11 PROCEDURE — 36415 COLL VENOUS BLD VENIPUNCTURE: CPT

## 2020-05-11 PROCEDURE — 700105 HCHG RX REV CODE 258: Performed by: EMERGENCY MEDICINE

## 2020-05-11 PROCEDURE — 96365 THER/PROPH/DIAG IV INF INIT: CPT

## 2020-05-11 PROCEDURE — 87040 BLOOD CULTURE FOR BACTERIA: CPT

## 2020-05-11 PROCEDURE — 99284 EMERGENCY DEPT VISIT MOD MDM: CPT

## 2020-05-11 PROCEDURE — 85025 COMPLETE CBC W/AUTO DIFF WBC: CPT

## 2020-05-11 PROCEDURE — 80053 COMPREHEN METABOLIC PANEL: CPT

## 2020-05-11 PROCEDURE — 85652 RBC SED RATE AUTOMATED: CPT

## 2020-05-11 RX ADMIN — AMPICILLIN SODIUM AND SULBACTAM SODIUM 3 G: 2; 1 INJECTION, POWDER, FOR SOLUTION INTRAMUSCULAR; INTRAVENOUS at 08:46

## 2020-05-11 ASSESSMENT — FIBROSIS 4 INDEX: FIB4 SCORE: 0.95

## 2020-05-11 NOTE — ED TRIAGE NOTES
"Chief Complaint   Patient presents with   • Hand Swelling     Pt reports left hand redness and swelling that started Saturday; went to UC yesterday and given abx and has been taking as prescribed; redness worse today   • Wound Check     /74   Pulse 91   Temp 35.8 °C (96.5 °F) (Temporal)   Resp 16   Ht 1.93 m (6' 4\")   Wt 120.4 kg (265 lb 6.9 oz)   SpO2 96%   BMI 32.31 kg/m²     Negative COVID screen. Pt reports no injury occurred prior to noticing pain and swelling.   "

## 2020-05-11 NOTE — ED PROVIDER NOTES
ED Provider Note    ER PROVIDER NOTE      CHIEF COMPLAINT  Chief Complaint   Patient presents with   • Hand Swelling     Pt reports left hand redness and swelling that started Saturday; went to UC yesterday and given abx and has been taking as prescribed; redness worse today   • Wound Check       HPI  Richard Franklin is a 50 y.o. male who presents to the emergency department complaining of redness and swelling to his left hand.  Patient reports his symptoms began on Saturday when he noticed some swelling on the top of his hand, he went to UC  yesterday started on Keflex, he states he woke this morning the redness was worse although has improved at this point.  Denies any injury to the area, denies any open wound or lesion.  No pain or redness or swelling on the palm of his hand.  He denies any fevers or chills.  No nausea abdominal pain.    No cough or congestion or recent illness, exposures or travel    REVIEW OF SYSTEMS  Pertinent positives include hand redness and swelling. Pertinent negatives include no fever. See HPI for details. All other systems reviewed and are negative.    PAST MEDICAL HISTORY   has a past medical history of Chronic combined systolic and diastolic heart failure (HCC), Dilated cardiomyopathy (HCC), DM (diabetes mellitus) (HCC), History of obesity, Hyperlipoproteinemia, Hypertension, Hypertriglyceridemia, Mixed hyperlipidemia, Palpitations, PVC (premature ventricular contraction), Sleep apnea, and Ventricular tachycardia (HCC).    SURGICAL HISTORY   has a past surgical history that includes other; zzz cardiac cath; and aicd implant.    FAMILY HISTORY  Family History   Problem Relation Age of Onset   • Hypertension Mother        SOCIAL HISTORY  Social History     Socioeconomic History   • Marital status:      Spouse name: Not on file   • Number of children: Not on file   • Years of education: Not on file   • Highest education level: Not on file   Occupational History   • Not on file  "  Social Needs   • Financial resource strain: Not on file   • Food insecurity     Worry: Not on file     Inability: Not on file   • Transportation needs     Medical: Not on file     Non-medical: Not on file   Tobacco Use   • Smoking status: Never Smoker   • Smokeless tobacco: Former User   Substance and Sexual Activity   • Alcohol use: Yes     Comment: Occasionally   • Drug use: No   • Sexual activity: Not on file   Lifestyle   • Physical activity     Days per week: Not on file     Minutes per session: Not on file   • Stress: Not on file   Relationships   • Social connections     Talks on phone: Not on file     Gets together: Not on file     Attends Druze service: Not on file     Active member of club or organization: Not on file     Attends meetings of clubs or organizations: Not on file     Relationship status: Not on file   • Intimate partner violence     Fear of current or ex partner: Not on file     Emotionally abused: Not on file     Physically abused: Not on file     Forced sexual activity: Not on file   Other Topics Concern   • Not on file   Social History Narrative   • Not on file      Social History     Substance and Sexual Activity   Drug Use No       CURRENT MEDICATIONS  Home Medications    **Home medications have not yet been reviewed for this encounter**         ALLERGIES  No Known Allergies    PHYSICAL EXAM  VITAL SIGNS: /65   Pulse 81   Temp 35.8 °C (96.5 °F) (Temporal)   Resp 16   Ht 1.93 m (6' 4\")   Wt 120.4 kg (265 lb 6.9 oz)   SpO2 95%   BMI 32.31 kg/m²   Pulse ox interpretation: I interpret this pulse ox as normal.    Constitutional: Alert in no apparent distress.  HENT: No signs of trauma, Bilateral external ears normal, Nose normal.   Eyes: Pupils are equal and reactive, Conjunctiva normal, Non-icteric.   Neck: Normal range of motion, No tenderness, Supple, No stridor.   Lymphatic: No lymphadenopathy noted.   Cardiovascular: Regular rate and rhythm, no murmurs.   Thorax & " "Lungs: Normal breath sounds, No respiratory distress, No wheezing, No chest tenderness.   Abdomen: Bowel sounds normal, Soft, No tenderness, No masses, No pulsatile masses. No peritoneal signs.  Skin: Warm, Dry, No erythema, No rash.   Back: No bony tenderness, No CVA tenderness.   Extremities: There is some slight edema to the dorsum of the left hand and redness, approximately 3 cm near the distal portion of the third metacarpal, minimal tenderness, no fluctuance or induration, no proximal streaking, distal capillary refills less than 2 seconds.  Patient is able to close his hand with , has no tenderness on the palmar aspect of his hand, hand is held in normal relaxed, not flexed position, intact distal pulses, No edema, No tenderness, No cyanosis,   Musculoskeletal: Good range of motion in all major joints. No tenderness to palpation or major deformities noted.   Neurologic: Alert , Normal motor function, Normal sensory function, No focal deficits noted.   Psychiatric: Affect normal, Judgment normal, Mood normal.     DIAGNOSTIC STUDIES / PROCEDURES      LABS  Labs Reviewed   CBC WITH DIFFERENTIAL - Abnormal; Notable for the following components:       Result Value    WBC 11.1 (*)     MCHC 33.3 (*)     Platelet Count 149 (*)     Neutrophils-Polys 79.70 (*)     Lymphocytes 8.10 (*)     Neutrophils (Absolute) 8.80 (*)     Lymphs (Absolute) 0.90 (*)     Monos (Absolute) 1.08 (*)     All other components within normal limits   COMP METABOLIC PANEL - Abnormal; Notable for the following components:    Glucose 329 (*)     Bun 29 (*)     All other components within normal limits   CRP QUANTITIVE (NON-CARDIAC) - Abnormal; Notable for the following components:    Stat C-Reactive Protein 4.49 (*)     All other components within normal limits   BLOOD CULTURE    Narrative:     Per Hospital Policy: Only change Specimen Src: to \"Line\" if  specified by physician order.   BLOOD CULTURE    Narrative:     Per Hospital Policy: " "Only change Specimen Src: to \"Line\" if  specified by physician order.   SED RATE   ESTIMATED GFR       All labs reviewed by me.    RADIOLOGY  No orders to display     The radiologist's interpretation of all radiological studies have been reviewed by me.    COURSE & MEDICAL DECISION MAKING  Nursing notes, VS, PMSFHx reviewed in chart.    8:18 AM Patient seen and examined at bedside. Patient will be treated with unasyn IV. Ordered for CBC, CMP, sed rate, C RP, blood cultures to evaluate his symptoms.     10:01 AM  Patient reevaluated, updated on results, slight improvement in redness, will plan for discharge    This patient was cared for during the COVID-19 pandemic.  History and physical exam may be limited/truncated by the inherent challenges of PPE and the need to decrease staff exposure to novel coronavirus.  Some aspects of disease management may be different to protect staff and help slow the spread of disease. I verified that, if possible, the patient was wearing a mask and I was wearing appropriate PPE every time I encountered the patient.     Current [facility] COVID19 protocol followed          Decision Making:  This is a 50 y.o. male presenting with hand redness and swelling.  I would not expect much improvement after less than 24 hours on oral antibiotic, and his symptoms are consistent with cellulitis, and seems to be contained on the dorsal aspect.  He has negative Knavel sign, I do not suspect flexor tenosynovitis at this time.  Patient is overall well-appearing, afebrile without findings to suggest sepsis or more systemic involvement.  Appearance does not suggest necrotizing fasciitis.  Patient was given Unasyn here, keflex at home.  Patient is slightly hyperglycemic without evidence of Acidosis significant dehydration.  Patient will follow-up with his primary care for recheck of the area, he is unable to see his primary care doctor he will return here for recheck     The patient will return for new or " worsening symptoms and is stable at the time of discharge.    The patient is referred to a primary physician for blood pressure management, diabetic screening, and for all other preventative health concerns.        DISPOSITION:  Patient will be discharged home in stable condition.    FOLLOW UP:  Ravi Mares M.D.  [address]  [place] [address]  [phone]    In 3 days      [facility], Emergency Dept  [address]  [place] [address]  [phone]    If symptoms worsen or if you are unable to follow-up with your primary care in 3 days for wound recheck      OUTPATIENT MEDICATIONS:  New Prescriptions    No medications on file         FINAL IMPRESSION  1. Cellulitis of hand         The note accurately reflects work and decisions made by me.  Fazal Culp M.D.  5/11/2020  10:03 AM

## 2020-05-11 NOTE — ED NOTES
Patient c/o left hand swelling since Saturday. Denies any trauma to area. Patient went to [facility] yesterday and was given antibiotics. Patient advised that they have not worked yet and that the swelling is getting worse. Patients hand is warm to the touch and red at this time.

## 2020-05-12 ENCOUNTER — HOSPITAL ENCOUNTER (INPATIENT)
Facility: MEDICAL CENTER | Age: 50
LOS: 4 days | DRG: 982 | End: 2020-05-16
Attending: EMERGENCY MEDICINE | Admitting: INTERNAL MEDICINE
Payer: COMMERCIAL

## 2020-05-12 DIAGNOSIS — L03.114 CELLULITIS OF LEFT UPPER EXTREMITY: ICD-10-CM

## 2020-05-12 DIAGNOSIS — L03.119 CELLULITIS OF HAND: ICD-10-CM

## 2020-05-12 DIAGNOSIS — Z98.890 STATUS POST INCISION AND DRAINAGE: ICD-10-CM

## 2020-05-12 DIAGNOSIS — R73.9 HYPERGLYCEMIA: ICD-10-CM

## 2020-05-12 LAB
ALBUMIN SERPL BCP-MCNC: 4.1 G/DL (ref 3.2–4.9)
ALBUMIN/GLOB SERPL: 1.2 G/DL
ALP SERPL-CCNC: 50 U/L (ref 30–99)
ALT SERPL-CCNC: 20 U/L (ref 2–50)
ANION GAP SERPL CALC-SCNC: 15 MMOL/L (ref 7–16)
AST SERPL-CCNC: 15 U/L (ref 12–45)
BASOPHILS # BLD AUTO: 0.5 % (ref 0–1.8)
BASOPHILS # BLD: 0.06 K/UL (ref 0–0.12)
BILIRUB SERPL-MCNC: 0.5 MG/DL (ref 0.1–1.5)
BUN SERPL-MCNC: 30 MG/DL (ref 8–22)
CALCIUM SERPL-MCNC: 9.1 MG/DL (ref 8.5–10.5)
CHLORIDE SERPL-SCNC: 105 MMOL/L (ref 96–112)
CO2 SERPL-SCNC: 21 MMOL/L (ref 20–33)
CREAT SERPL-MCNC: 1.37 MG/DL (ref 0.5–1.4)
EOSINOPHIL # BLD AUTO: 0.28 K/UL (ref 0–0.51)
EOSINOPHIL NFR BLD: 2.5 % (ref 0–6.9)
ERYTHROCYTE [DISTWIDTH] IN BLOOD BY AUTOMATED COUNT: 46.5 FL (ref 35.9–50)
GLOBULIN SER CALC-MCNC: 3.4 G/DL (ref 1.9–3.5)
GLUCOSE BLD-MCNC: 141 MG/DL (ref 65–99)
GLUCOSE SERPL-MCNC: 180 MG/DL (ref 65–99)
HCT VFR BLD AUTO: 42.6 % (ref 42–52)
HGB BLD-MCNC: 14.2 G/DL (ref 14–18)
IMM GRANULOCYTES # BLD AUTO: 0.05 K/UL (ref 0–0.11)
IMM GRANULOCYTES NFR BLD AUTO: 0.4 % (ref 0–0.9)
LACTATE BLD-SCNC: 1.8 MMOL/L (ref 0.5–2)
LYMPHOCYTES # BLD AUTO: 1.76 K/UL (ref 1–4.8)
LYMPHOCYTES NFR BLD: 15.6 % (ref 22–41)
MCH RBC QN AUTO: 29.9 PG (ref 27–33)
MCHC RBC AUTO-ENTMCNC: 33.3 G/DL (ref 33.7–35.3)
MCV RBC AUTO: 89.7 FL (ref 81.4–97.8)
MONOCYTES # BLD AUTO: 1.44 K/UL (ref 0–0.85)
MONOCYTES NFR BLD AUTO: 12.8 % (ref 0–13.4)
NEUTROPHILS # BLD AUTO: 7.7 K/UL (ref 1.82–7.42)
NEUTROPHILS NFR BLD: 68.2 % (ref 44–72)
NRBC # BLD AUTO: 0 K/UL
NRBC BLD-RTO: 0 /100 WBC
PLATELET # BLD AUTO: 156 K/UL (ref 164–446)
PMV BLD AUTO: 10.2 FL (ref 9–12.9)
POTASSIUM SERPL-SCNC: 4.4 MMOL/L (ref 3.6–5.5)
PROT SERPL-MCNC: 7.5 G/DL (ref 6–8.2)
RBC # BLD AUTO: 4.75 M/UL (ref 4.7–6.1)
SODIUM SERPL-SCNC: 141 MMOL/L (ref 135–145)
WBC # BLD AUTO: 11.3 K/UL (ref 4.8–10.8)

## 2020-05-12 PROCEDURE — 99285 EMERGENCY DEPT VISIT HI MDM: CPT

## 2020-05-12 PROCEDURE — 99223 1ST HOSP IP/OBS HIGH 75: CPT | Performed by: INTERNAL MEDICINE

## 2020-05-12 PROCEDURE — 82962 GLUCOSE BLOOD TEST: CPT

## 2020-05-12 PROCEDURE — 80053 COMPREHEN METABOLIC PANEL: CPT

## 2020-05-12 PROCEDURE — 700111 HCHG RX REV CODE 636 W/ 250 OVERRIDE (IP): Performed by: EMERGENCY MEDICINE

## 2020-05-12 PROCEDURE — 700105 HCHG RX REV CODE 258: Performed by: INTERNAL MEDICINE

## 2020-05-12 PROCEDURE — 96365 THER/PROPH/DIAG IV INF INIT: CPT

## 2020-05-12 PROCEDURE — A9270 NON-COVERED ITEM OR SERVICE: HCPCS | Performed by: INTERNAL MEDICINE

## 2020-05-12 PROCEDURE — 85025 COMPLETE CBC W/AUTO DIFF WBC: CPT

## 2020-05-12 PROCEDURE — 700102 HCHG RX REV CODE 250 W/ 637 OVERRIDE(OP): Performed by: INTERNAL MEDICINE

## 2020-05-12 PROCEDURE — 770006 HCHG ROOM/CARE - MED/SURG/GYN SEMI*

## 2020-05-12 PROCEDURE — 83605 ASSAY OF LACTIC ACID: CPT

## 2020-05-12 PROCEDURE — 87040 BLOOD CULTURE FOR BACTERIA: CPT | Mod: 91

## 2020-05-12 PROCEDURE — 700105 HCHG RX REV CODE 258: Performed by: EMERGENCY MEDICINE

## 2020-05-12 PROCEDURE — 700111 HCHG RX REV CODE 636 W/ 250 OVERRIDE (IP): Performed by: INTERNAL MEDICINE

## 2020-05-12 PROCEDURE — 36415 COLL VENOUS BLD VENIPUNCTURE: CPT

## 2020-05-12 PROCEDURE — 96375 TX/PRO/DX INJ NEW DRUG ADDON: CPT

## 2020-05-12 RX ORDER — POLYETHYLENE GLYCOL 3350 17 G/17G
1 POWDER, FOR SOLUTION ORAL
Status: DISCONTINUED | OUTPATIENT
Start: 2020-05-12 | End: 2020-05-16 | Stop reason: HOSPADM

## 2020-05-12 RX ORDER — INSULIN GLARGINE 100 [IU]/ML
70 INJECTION, SOLUTION SUBCUTANEOUS EVERY EVENING
Status: DISCONTINUED | OUTPATIENT
Start: 2020-05-12 | End: 2020-05-16 | Stop reason: HOSPADM

## 2020-05-12 RX ORDER — BISACODYL 10 MG
10 SUPPOSITORY, RECTAL RECTAL
Status: DISCONTINUED | OUTPATIENT
Start: 2020-05-12 | End: 2020-05-16 | Stop reason: HOSPADM

## 2020-05-12 RX ORDER — HYDROCODONE BITARTRATE AND ACETAMINOPHEN 5; 325 MG/1; MG/1
1 TABLET ORAL EVERY 4 HOURS PRN
Status: DISCONTINUED | OUTPATIENT
Start: 2020-05-12 | End: 2020-05-15

## 2020-05-12 RX ORDER — ATORVASTATIN CALCIUM 20 MG/1
20 TABLET, FILM COATED ORAL DAILY
Status: DISCONTINUED | OUTPATIENT
Start: 2020-05-12 | End: 2020-05-16 | Stop reason: HOSPADM

## 2020-05-12 RX ORDER — PROCHLORPERAZINE EDISYLATE 5 MG/ML
5-10 INJECTION INTRAMUSCULAR; INTRAVENOUS EVERY 4 HOURS PRN
Status: DISCONTINUED | OUTPATIENT
Start: 2020-05-12 | End: 2020-05-16 | Stop reason: HOSPADM

## 2020-05-12 RX ORDER — ENALAPRILAT 1.25 MG/ML
1.25 INJECTION INTRAVENOUS EVERY 6 HOURS PRN
Status: DISCONTINUED | OUTPATIENT
Start: 2020-05-12 | End: 2020-05-16 | Stop reason: HOSPADM

## 2020-05-12 RX ORDER — ACETAMINOPHEN 325 MG/1
650 TABLET ORAL EVERY 6 HOURS PRN
Status: DISCONTINUED | OUTPATIENT
Start: 2020-05-12 | End: 2020-05-16 | Stop reason: HOSPADM

## 2020-05-12 RX ORDER — ONDANSETRON 2 MG/ML
4 INJECTION INTRAMUSCULAR; INTRAVENOUS EVERY 4 HOURS PRN
Status: DISCONTINUED | OUTPATIENT
Start: 2020-05-12 | End: 2020-05-16 | Stop reason: HOSPADM

## 2020-05-12 RX ORDER — DEXTROSE MONOHYDRATE 25 G/50ML
50 INJECTION, SOLUTION INTRAVENOUS
Status: DISCONTINUED | OUTPATIENT
Start: 2020-05-12 | End: 2020-05-16 | Stop reason: HOSPADM

## 2020-05-12 RX ORDER — PROMETHAZINE HYDROCHLORIDE 25 MG/1
12.5-25 TABLET ORAL EVERY 4 HOURS PRN
Status: DISCONTINUED | OUTPATIENT
Start: 2020-05-12 | End: 2020-05-16 | Stop reason: HOSPADM

## 2020-05-12 RX ORDER — LISINOPRIL 20 MG/1
40 TABLET ORAL DAILY
Status: DISCONTINUED | OUTPATIENT
Start: 2020-05-12 | End: 2020-05-16 | Stop reason: HOSPADM

## 2020-05-12 RX ORDER — ONDANSETRON 4 MG/1
4 TABLET, ORALLY DISINTEGRATING ORAL EVERY 4 HOURS PRN
Status: DISCONTINUED | OUTPATIENT
Start: 2020-05-12 | End: 2020-05-16 | Stop reason: HOSPADM

## 2020-05-12 RX ORDER — CARVEDILOL 25 MG/1
25 TABLET ORAL 2 TIMES DAILY WITH MEALS
Status: DISCONTINUED | OUTPATIENT
Start: 2020-05-12 | End: 2020-05-16 | Stop reason: HOSPADM

## 2020-05-12 RX ORDER — AMOXICILLIN 250 MG
2 CAPSULE ORAL 2 TIMES DAILY
Status: DISCONTINUED | OUTPATIENT
Start: 2020-05-12 | End: 2020-05-16 | Stop reason: HOSPADM

## 2020-05-12 RX ORDER — PROMETHAZINE HYDROCHLORIDE 25 MG/1
12.5-25 SUPPOSITORY RECTAL EVERY 4 HOURS PRN
Status: DISCONTINUED | OUTPATIENT
Start: 2020-05-12 | End: 2020-05-16 | Stop reason: HOSPADM

## 2020-05-12 RX ADMIN — VANCOMYCIN HYDROCHLORIDE 3 G: 500 INJECTION, POWDER, LYOPHILIZED, FOR SOLUTION INTRAVENOUS at 19:57

## 2020-05-12 RX ADMIN — LISINOPRIL 40 MG: 20 TABLET ORAL at 23:59

## 2020-05-12 RX ADMIN — ATORVASTATIN CALCIUM 20 MG: 20 TABLET, FILM COATED ORAL at 23:59

## 2020-05-12 RX ADMIN — CARVEDILOL 25 MG: 25 TABLET, FILM COATED ORAL at 23:59

## 2020-05-12 RX ADMIN — AMPICILLIN SODIUM AND SULBACTAM SODIUM 3 G: 2; 1 INJECTION, POWDER, FOR SOLUTION INTRAMUSCULAR; INTRAVENOUS at 19:10

## 2020-05-12 RX ADMIN — AMPICILLIN SODIUM AND SULBACTAM SODIUM 3 G: 2; 1 INJECTION, POWDER, FOR SOLUTION INTRAMUSCULAR; INTRAVENOUS at 23:59

## 2020-05-12 SDOH — HEALTH STABILITY: MENTAL HEALTH: HOW OFTEN DO YOU HAVE A DRINK CONTAINING ALCOHOL?: 2-4 TIMES A MONTH

## 2020-05-12 SDOH — HEALTH STABILITY: MENTAL HEALTH: HOW OFTEN DO YOU HAVE 6 OR MORE DRINKS ON ONE OCCASION?: NEVER

## 2020-05-12 SDOH — HEALTH STABILITY: MENTAL HEALTH: HOW MANY STANDARD DRINKS CONTAINING ALCOHOL DO YOU HAVE ON A TYPICAL DAY?: 1 OR 2

## 2020-05-12 ASSESSMENT — FIBROSIS 4 INDEX
FIB4 SCORE: 1.05
FIB4 SCORE: 1.07
FIB4 SCORE: 1.05

## 2020-05-12 ASSESSMENT — ENCOUNTER SYMPTOMS
CHILLS: 0
COUGH: 0
MYALGIAS: 0
FEVER: 0
NAUSEA: 0
SHORTNESS OF BREATH: 0
STRIDOR: 0
DEPRESSION: 0
DIARRHEA: 0
CONSTIPATION: 0
TINGLING: 0
PALPITATIONS: 0
SPUTUM PRODUCTION: 0
WEAKNESS: 0
FALLS: 0
DIZZINESS: 0
HEADACHES: 0
LOSS OF CONSCIOUSNESS: 0
VOMITING: 0
ABDOMINAL PAIN: 0

## 2020-05-12 ASSESSMENT — COGNITIVE AND FUNCTIONAL STATUS - GENERAL
DAILY ACTIVITIY SCORE: 24
SUGGESTED CMS G CODE MODIFIER DAILY ACTIVITY: CH
SUGGESTED CMS G CODE MODIFIER MOBILITY: CH
MOBILITY SCORE: 24

## 2020-05-12 ASSESSMENT — LIFESTYLE VARIABLES
DOES PATIENT WANT TO STOP DRINKING: NO
EVER FELT BAD OR GUILTY ABOUT YOUR DRINKING: NO
HAVE YOU EVER FELT YOU SHOULD CUT DOWN ON YOUR DRINKING: NO
TOTAL SCORE: 0
ON A TYPICAL DAY WHEN YOU DRINK ALCOHOL HOW MANY DRINKS DO YOU HAVE: 1
AVERAGE NUMBER OF DAYS PER WEEK YOU HAVE A DRINK CONTAINING ALCOHOL: 1
EVER HAD A DRINK FIRST THING IN THE MORNING TO STEADY YOUR NERVES TO GET RID OF A HANGOVER: NO
TOTAL SCORE: 0
HOW MANY TIMES IN THE PAST YEAR HAVE YOU HAD 5 OR MORE DRINKS IN A DAY: 0
CONSUMPTION TOTAL: NEGATIVE
EVER_SMOKED: NEVER
HAVE PEOPLE ANNOYED YOU BY CRITICIZING YOUR DRINKING: NO
DO YOU DRINK ALCOHOL: YES
TOTAL SCORE: 0

## 2020-05-12 ASSESSMENT — PAIN DESCRIPTION - DESCRIPTORS: DESCRIPTORS: ACHING

## 2020-05-13 ENCOUNTER — APPOINTMENT (OUTPATIENT)
Dept: RADIOLOGY | Facility: MEDICAL CENTER | Age: 50
DRG: 982 | End: 2020-05-13
Attending: NURSE PRACTITIONER
Payer: COMMERCIAL

## 2020-05-13 PROBLEM — D72.829 LEUKOCYTOSIS: Status: ACTIVE | Noted: 2020-05-13

## 2020-05-13 PROBLEM — L03.90 CELLULITIS: Status: ACTIVE | Noted: 2020-05-13

## 2020-05-13 LAB
ANION GAP SERPL CALC-SCNC: 12 MMOL/L (ref 7–16)
BUN SERPL-MCNC: 26 MG/DL (ref 8–22)
CALCIUM SERPL-MCNC: 8.9 MG/DL (ref 8.5–10.5)
CHLORIDE SERPL-SCNC: 102 MMOL/L (ref 96–112)
CO2 SERPL-SCNC: 18 MMOL/L (ref 20–33)
CREAT SERPL-MCNC: 1.07 MG/DL (ref 0.5–1.4)
ERYTHROCYTE [DISTWIDTH] IN BLOOD BY AUTOMATED COUNT: 45.3 FL (ref 35.9–50)
GLUCOSE BLD-MCNC: 210 MG/DL (ref 65–99)
GLUCOSE BLD-MCNC: 247 MG/DL (ref 65–99)
GLUCOSE BLD-MCNC: 253 MG/DL (ref 65–99)
GLUCOSE BLD-MCNC: 298 MG/DL (ref 65–99)
GLUCOSE SERPL-MCNC: 260 MG/DL (ref 65–99)
HCT VFR BLD AUTO: 40.4 % (ref 42–52)
HGB BLD-MCNC: 13.3 G/DL (ref 14–18)
MCH RBC QN AUTO: 29.6 PG (ref 27–33)
MCHC RBC AUTO-ENTMCNC: 32.9 G/DL (ref 33.7–35.3)
MCV RBC AUTO: 90 FL (ref 81.4–97.8)
PLATELET # BLD AUTO: 145 K/UL (ref 164–446)
PMV BLD AUTO: 10 FL (ref 9–12.9)
POTASSIUM SERPL-SCNC: 4.2 MMOL/L (ref 3.6–5.5)
RBC # BLD AUTO: 4.49 M/UL (ref 4.7–6.1)
SODIUM SERPL-SCNC: 132 MMOL/L (ref 135–145)
WBC # BLD AUTO: 10 K/UL (ref 4.8–10.8)

## 2020-05-13 PROCEDURE — 85027 COMPLETE CBC AUTOMATED: CPT

## 2020-05-13 PROCEDURE — A9270 NON-COVERED ITEM OR SERVICE: HCPCS | Performed by: INTERNAL MEDICINE

## 2020-05-13 PROCEDURE — 700102 HCHG RX REV CODE 250 W/ 637 OVERRIDE(OP): Performed by: INTERNAL MEDICINE

## 2020-05-13 PROCEDURE — 82962 GLUCOSE BLOOD TEST: CPT | Mod: 91

## 2020-05-13 PROCEDURE — 700111 HCHG RX REV CODE 636 W/ 250 OVERRIDE (IP): Performed by: INTERNAL MEDICINE

## 2020-05-13 PROCEDURE — 99233 SBSQ HOSP IP/OBS HIGH 50: CPT | Performed by: HOSPITALIST

## 2020-05-13 PROCEDURE — 700105 HCHG RX REV CODE 258: Performed by: INTERNAL MEDICINE

## 2020-05-13 PROCEDURE — 80048 BASIC METABOLIC PNL TOTAL CA: CPT

## 2020-05-13 PROCEDURE — 73120 X-RAY EXAM OF HAND: CPT | Mod: LT

## 2020-05-13 PROCEDURE — 36415 COLL VENOUS BLD VENIPUNCTURE: CPT

## 2020-05-13 PROCEDURE — 770006 HCHG ROOM/CARE - MED/SURG/GYN SEMI*

## 2020-05-13 RX ADMIN — INSULIN HUMAN 4 UNITS: 100 INJECTION, SOLUTION PARENTERAL at 17:20

## 2020-05-13 RX ADMIN — LISINOPRIL 40 MG: 20 TABLET ORAL at 20:20

## 2020-05-13 RX ADMIN — INSULIN GLARGINE 70 UNITS: 100 INJECTION, SOLUTION SUBCUTANEOUS at 17:14

## 2020-05-13 RX ADMIN — HYDROCODONE BITARTRATE AND ACETAMINOPHEN 1 TABLET: 5; 325 TABLET ORAL at 05:19

## 2020-05-13 RX ADMIN — AMPICILLIN SODIUM AND SULBACTAM SODIUM 3 G: 2; 1 INJECTION, POWDER, FOR SOLUTION INTRAMUSCULAR; INTRAVENOUS at 12:06

## 2020-05-13 RX ADMIN — HYDROCODONE BITARTRATE AND ACETAMINOPHEN 1 TABLET: 5; 325 TABLET ORAL at 09:25

## 2020-05-13 RX ADMIN — INSULIN HUMAN 4 UNITS: 100 INJECTION, SOLUTION PARENTERAL at 08:14

## 2020-05-13 RX ADMIN — ENOXAPARIN SODIUM 40 MG: 100 INJECTION SUBCUTANEOUS at 05:13

## 2020-05-13 RX ADMIN — CARVEDILOL 25 MG: 25 TABLET, FILM COATED ORAL at 17:13

## 2020-05-13 RX ADMIN — INSULIN HUMAN 7 UNITS: 100 INJECTION, SOLUTION PARENTERAL at 12:05

## 2020-05-13 RX ADMIN — INSULIN HUMAN 7 UNITS: 100 INJECTION, SOLUTION PARENTERAL at 20:25

## 2020-05-13 RX ADMIN — AMPICILLIN SODIUM AND SULBACTAM SODIUM 3 G: 2; 1 INJECTION, POWDER, FOR SOLUTION INTRAMUSCULAR; INTRAVENOUS at 05:12

## 2020-05-13 RX ADMIN — AMPICILLIN SODIUM AND SULBACTAM SODIUM 3 G: 2; 1 INJECTION, POWDER, FOR SOLUTION INTRAMUSCULAR; INTRAVENOUS at 17:09

## 2020-05-13 RX ADMIN — VANCOMYCIN HYDROCHLORIDE 2000 MG: 500 INJECTION, POWDER, LYOPHILIZED, FOR SOLUTION INTRAVENOUS at 09:25

## 2020-05-13 RX ADMIN — CARVEDILOL 25 MG: 25 TABLET, FILM COATED ORAL at 08:14

## 2020-05-13 RX ADMIN — SENNOSIDES-DOCUSATE SODIUM TAB 8.6-50 MG 2 TABLET: 8.6-5 TAB at 17:13

## 2020-05-13 RX ADMIN — ATORVASTATIN CALCIUM 20 MG: 20 TABLET, FILM COATED ORAL at 20:20

## 2020-05-13 RX ADMIN — VANCOMYCIN HYDROCHLORIDE 2000 MG: 500 INJECTION, POWDER, LYOPHILIZED, FOR SOLUTION INTRAVENOUS at 20:19

## 2020-05-13 ASSESSMENT — ENCOUNTER SYMPTOMS
TINGLING: 0
DOUBLE VISION: 0
WEAKNESS: 0
TREMORS: 0
VOMITING: 0
FEVER: 0
ABDOMINAL PAIN: 0
DIZZINESS: 0
FOCAL WEAKNESS: 0
DIARRHEA: 0
NAUSEA: 0
CONSTIPATION: 0
BLURRED VISION: 0
PALPITATIONS: 0
SORE THROAT: 0
CHILLS: 0
HEADACHES: 0
MYALGIAS: 1
SHORTNESS OF BREATH: 0
COUGH: 0

## 2020-05-13 NOTE — ED NOTES
Patient arrives with c/o left hand pain, redness, edema. Patient states began Saturday afternoon, worsening over past 3 days. Pt evaluated at urgent care, prescribed oral atb. Evaluated yesterday at [facility], given dose of IV ATB. Patient states today redness and edema significantly worsening. Patient with noted redness, edema, painful to touch to left hand. Patient pulses strong, palpable BUE. Patient denies additional complaints. ERP at bedside, patient updated on POC. Denies needs or questions, call light within reach, will continue to monitor.

## 2020-05-13 NOTE — CARE PLAN
Problem: Safety  Goal: Will remain free from falls  Note: Safety precautions in place. Bed in low position, treaded socks on, personal possessions in reach, call light in reach.

## 2020-05-13 NOTE — ED TRIAGE NOTES
Left hand redness, swelling and pain.  Seen twice and given antibiotics, continues to get worse and is now spreading up his arm.  Patient is a diabetic.  Hand is red and swollen.

## 2020-05-13 NOTE — ED PROVIDER NOTES
ED Provider Note    CHIEF COMPLAINT  Chief Complaint   Patient presents with   • Hand Pain       HPI  Richard Franklin is a 50 y.o. male who presents to the emergency department complaining of left hand pain.  The patient states he started having left hand pain over the weekend.  It started over his third knuckle the MCP joint.  [place] like he might of hit it.  X-ray was, red and swollen.  To urgent care was put on Keflex and was given some sounds like IM Rocephin.  This became more painful red and swollen.  He then went to [facility] where he was given some IV Unasyn.  He has been taking medications and despite that his hand continues to become more painful red and swollen.  Physically he has been swelling of the arm.  Denies any fevers or chills.  Denies any chest pain, shortness of breath or other complaints.  Nothing makes it better or worse and has no other associated complaints.    REVIEW OF SYSTEMS  See HPI for further details. All other systems are negative.    PAST MEDICAL HISTORY  Past Medical History:   Diagnosis Date   • Chronic combined systolic and diastolic heart failure (HCC)    • Dilated cardiomyopathy (HCC)     rEFof 30-35%   • DM (diabetes mellitus) (HCC)    • History of obesity    • Hyperlipoproteinemia    • Hypertension    • Hypertriglyceridemia    • Mixed hyperlipidemia    • Palpitations    • PVC (premature ventricular contraction)    • Sleep apnea    • Ventricular tachycardia (HCC)        FAMILY HISTORY  Family History   Problem Relation Age of Onset   • Hypertension Mother        SOCIAL HISTORY  Social History     Socioeconomic History   • Marital status:      Spouse name: Not on file   • Number of children: Not on file   • Years of education: Not on file   • Highest education level: Not on file   Occupational History   • Not on file   Social Needs   • Financial resource strain: Not on file   • Food insecurity     Worry: Not on file     Inability: Not on file   • Transportation needs      Medical: Not on file     Non-medical: Not on file   Tobacco Use   • Smoking status: Never Smoker   • Smokeless tobacco: Former User   Substance and Sexual Activity   • Alcohol use: Yes     Comment: Occasionally   • Drug use: No   • Sexual activity: Not on file   Lifestyle   • Physical activity     Days per week: Not on file     Minutes per session: Not on file   • Stress: Not on file   Relationships   • Social connections     Talks on phone: Not on file     Gets together: Not on file     Attends Yazdanism service: Not on file     Active member of club or organization: Not on file     Attends meetings of clubs or organizations: Not on file     Relationship status: Not on file   • Intimate partner violence     Fear of current or ex partner: Not on file     Emotionally abused: Not on file     Physically abused: Not on file     Forced sexual activity: Not on file   Other Topics Concern   • Not on file   Social History Narrative   • Not on file       SURGICAL HISTORY  Past Surgical History:   Procedure Laterality Date   • AICD IMPLANT     • OTHER      THROAT SURGERY: Uvulopalatopharyngoplasty.   • ZZZ CARDIAC CATH         CURRENT MEDICATIONS  Home Medications     Reviewed by Precious Liang R.N. (Registered Nurse) on 05/12/20 at 1743  Med List Status: Not Addressed   Medication Last Dose Status   atorvastatin (LIPITOR) 20 MG Tab  Active   carvedilol (COREG) 25 MG Tab  Active   FARXIGA 10 MG Tab  Active   HYDROcodone-acetaminophen (NORCO) 5-325 MG Tab per tablet  Active   Icosapent Ethyl (VASCEPA) 1 g Cap  Active   insulin aspart (NOVOLOG) 100 UNIT/ML Solution  Active   Insulin Degludec (TRESIBA FLEXTOUCH) 100 UNIT/ML Solution Pen-injector  Active   insulin glargine (BASAGLAR KWIKPEN) 100 UNIT/ML injection PEN  Active   lisinopril (PRINIVIL, ZESTRIL) 40 MG tablet  Active   NOVOLOG FLEXPEN 100 UNIT/ML solution for injection  Active   SitaGLIPtin-MetFORMIN HCl (JANUMET XR)  MG TABLET SR 24 HR  Active  "  spironolactone (ALDACTONE) 25 MG Tab  Active                ALLERGIES  No Known Allergies    PHYSICAL EXAM  VITAL SIGNS: /77   Pulse 80   Temp 36.8 °C (98.2 °F) (Temporal)   Resp 18   Ht 1.93 m (6' 4\")   Wt 122 kg (268 lb 15.4 oz)   SpO2 98%   BMI 32.74 kg/m²    Constitutional: Well developed, Well nourished, No acute distress, Non-toxic appearance.   HENT: Normocephalic, Atraumatic, Bilateral external ears normal, Oropharynx moist, No oral exudates, Nose normal.   Eyes: PERRL, EOMI, Conjunctiva normal, No discharge.   Neck: Normal range of motion, No tenderness, Supple, No stridor.   Lymphatic: No lymphadenopathy noted.   Cardiovascular: Normal heart rate, Normal rhythm, No murmurs, No rubs, No gallops.   Thorax & Lungs: Normal breath sounds, No respiratory distress, No wheezing  Abdomen: Bowel sounds normal, Soft, No tenderness   Skin: Warm, Dry, No erythema, No rash.    Musculoskeletal: Good range of motion in all major joints left hand is swollen over the dorsum.  Redness localized to the dorsum of the hand.  No lymphangitis.  Neurologic: Alert,  No focal deficits noted.   Psychiatric: Affect normal    Results for orders placed or performed during the hospital encounter of 05/12/20   CBC WITH DIFFERENTIAL   Result Value Ref Range    WBC 11.3 (H) 4.8 - 10.8 K/uL    RBC 4.75 4.70 - 6.10 M/uL    Hemoglobin 14.2 14.0 - 18.0 g/dL    Hematocrit 42.6 42.0 - 52.0 %    MCV 89.7 81.4 - 97.8 fL    MCH 29.9 27.0 - 33.0 pg    MCHC 33.3 (L) 33.7 - 35.3 g/dL    RDW 46.5 35.9 - 50.0 fL    Platelet Count 156 (L) 164 - 446 K/uL    MPV 10.2 9.0 - 12.9 fL    Neutrophils-Polys 68.20 44.00 - 72.00 %    Lymphocytes 15.60 (L) 22.00 - 41.00 %    Monocytes 12.80 0.00 - 13.40 %    Eosinophils 2.50 0.00 - 6.90 %    Basophils 0.50 0.00 - 1.80 %    Immature Granulocytes 0.40 0.00 - 0.90 %    Nucleated RBC 0.00 /100 WBC    Neutrophils (Absolute) 7.70 (H) 1.82 - 7.42 K/uL    Lymphs (Absolute) 1.76 1.00 - 4.80 K/uL    Monos " (Absolute) 1.44 (H) 0.00 - 0.85 K/uL    Eos (Absolute) 0.28 0.00 - 0.51 K/uL    Baso (Absolute) 0.06 0.00 - 0.12 K/uL    Immature Granulocytes (abs) 0.05 0.00 - 0.11 K/uL    NRBC (Absolute) 0.00 K/uL   COMP METABOLIC PANEL   Result Value Ref Range    Sodium 141 135 - 145 mmol/L    Potassium 4.4 3.6 - 5.5 mmol/L    Chloride 105 96 - 112 mmol/L    Co2 21 20 - 33 mmol/L    Anion Gap 15.0 7.0 - 16.0    Glucose 180 (H) 65 - 99 mg/dL    Bun 30 (H) 8 - 22 mg/dL    Creatinine 1.37 0.50 - 1.40 mg/dL    Calcium 9.1 8.5 - 10.5 mg/dL    AST(SGOT) 15 12 - 45 U/L    ALT(SGPT) 20 2 - 50 U/L    Alkaline Phosphatase 50 30 - 99 U/L    Total Bilirubin 0.5 0.1 - 1.5 mg/dL    Albumin 4.1 3.2 - 4.9 g/dL    Total Protein 7.5 6.0 - 8.2 g/dL    Globulin 3.4 1.9 - 3.5 g/dL    A-G Ratio 1.2 g/dL   LACTIC ACID   Result Value Ref Range    Lactic Acid 1.8 0.5 - 2.0 mmol/L   ESTIMATED GFR   Result Value Ref Range    GFR If African American >60 >60 mL/min/1.73 m 2    GFR If Non African American 55 (A) >60 mL/min/1.73 m 2          COURSE & MEDICAL DECISION MAKING  Pertinent Labs & Imaging studies reviewed. (See chart for details)    Patient presents to the emergency room today for his third medical encounter for right hand cellulitis.  He has been on antibiotics and reports compliance.  Patient was seen at [facility] and additional IV antibiotics given but despite that he continues to have increasing pain, swelling, and redness.    Patient is a diabetic with cardiomyopathy as well.    Patient is progressing on antibiotics and therefore is failed outpatient antibiotics.  The patient is worked up with basic lab work and cultures.  Started on IV antibiotics for cellulitis.  Do not fusion abscess at this time I do not see a need for surgical intervention.  He has no signs of flexor tenosynovitis.  The patient will be hospitalized for further work-up and treatment.  Discussed case the hospitalist and care transferred at that time        FINAL  IMPRESSION  1. Cellulitis of hand     2. Hyperglycemia     3, diabetes  4 failed outpatient therapy  2.    3.         Electronically signed by: Hussein Bonilla M.D., 5/12/2020 6:40 PM

## 2020-05-13 NOTE — PROGRESS NOTES
2 RN skin check completed with DARIUSZ Marcos.   Devices in place: PIV.  Skin assessed under devices: yes.  Confirmed pressure ulcers found: none.  New potential pressure ulcers noted: none. Wound consult placed:  n/a.    Skin assessment: heels pink/blanching , elbows pink/blanching. Left hand swollen/red. Rest of skin intact, no open areas noted.    The following interventions in place: pillows for positioning/support. Encouraged to reposition self frequently.

## 2020-05-13 NOTE — H&P
Hospital Medicine History & Physical Note    Date of Service  5/12/2020    Primary Care Physician  Ravi Marse M.D.    Code Status  Full Code    Chief Complaint  Chief Complaint   Patient presents with   • Hand Pain       History of Presenting Illness  50 y.o. male who presented 5/12/2020 with left hand cellulitis.  Patient states Saturday night he noted some left knuckle pain.  Then he began noticing swelling.  He described the pain as an ache, sharp with movement, 4/10 at its worse.  He states the swelling has caused decreased range of motion.  On Sunday morning he noticed that it was worse so he went to urgent care, was given IV antibiotic as well as a prescription for Keflex which he started taking.  On Monday he states it was worse again so he went to [facility] ER was given another IV antibiotic and told to continue on the Keflex.  Today it was still worse so he presented to the emergency department.  I did discuss the case including labs and imaging with the ER physician.    Review of Systems  Review of Systems   Constitutional: Negative for chills, fever and malaise/fatigue.   HENT: Negative for congestion.    Respiratory: Negative for cough, sputum production, shortness of breath and stridor.    Cardiovascular: Negative for chest pain, palpitations and leg swelling.   Gastrointestinal: Negative for abdominal pain, constipation, diarrhea, nausea and vomiting.   Genitourinary: Negative for dysuria and urgency.   Musculoskeletal: Negative for falls and myalgias.        Left hand/arm pain and swelling    Skin: Positive for rash.   Neurological: Negative for dizziness, tingling, loss of consciousness, weakness and headaches.   Psychiatric/Behavioral: Negative for depression and suicidal ideas.   All other systems reviewed and are negative.      Past Medical History   has a past medical history of Chronic combined systolic and diastolic heart failure (HCC), Dilated cardiomyopathy (HCC), DM (diabetes  mellitus) (HCC), History of obesity, Hyperlipoproteinemia, Hypertension, Hypertriglyceridemia, Mixed hyperlipidemia, Palpitations, PVC (premature ventricular contraction), Sleep apnea, and Ventricular tachycardia (HCC).    Surgical History   has a past surgical history that includes other; zzz cardiac cath; and aicd implant.     Family History  family history includes Hypertension in his mother.     Social History   reports that he has never smoked. He has quit using smokeless tobacco. He reports current alcohol use. He reports that he does not use drugs.    Allergies  No Known Allergies    Medications  Prior to Admission Medications   Prescriptions Last Dose Informant Patient Reported? Taking?   FARXIGA 10 MG Tab   Yes No   Sig: Take 1 tablet by mouth.   HYDROcodone-acetaminophen (NORCO) 5-325 MG Tab per tablet   Yes No   Icosapent Ethyl (VASCEPA) 1 g Cap  Patient's Home Pharmacy Yes No   Sig: Take 2 Caps by mouth 2 Times a Day.   Insulin Degludec (TRESIBA FLEXTOUCH) 100 UNIT/ML Solution Pen-injector   Yes No   Sig: Inject  as instructed.   NOVOLOG FLEXPEN 100 UNIT/ML solution for injection   Yes No   Sig: INJECT 30 UNITS SUBCUTANEOUSLY DAILY IN THE MORNING THEN INJECT 50 UNITS DAILY AT LUNCH AND INJECT 30 UNITS DAILY AT DINNER.   SitaGLIPtin-MetFORMIN HCl (JANUMET XR)  MG TABLET SR 24 HR  Patient's Home Pharmacy Yes No   Sig: Take 2 Tabs by mouth every day.   atorvastatin (LIPITOR) 20 MG Tab   No No   Sig: Take 1 Tab by mouth every day.   carvedilol (COREG) 25 MG Tab   No No   Sig: Take 1 Tab by mouth 2 Times a Day.   insulin aspart (NOVOLOG) 100 UNIT/ML Solution  Patient Yes No   Sig: Inject 30-50 Units as instructed 3 times a day before meals. 30 AM  50 AFTERNOON  30 PM   insulin glargine (BASAGLAR KWIKPEN) 100 UNIT/ML injection PEN   Yes No   Sig: INJECT 70 UNITS SUBCUTANEOUSLY EVERY NIGHT AT BEDTIME. INCREASE BY 1 UNIT EVERY NIGHT UNTIL GLUCOSE IS LESS THAN 180   lisinopril (PRINIVIL, ZESTRIL) 40 MG  tablet   No No   Sig: Take 1 Tab by mouth every day.   spironolactone (ALDACTONE) 25 MG Tab   No No   Sig: Take 1 Tab by mouth every day.      Facility-Administered Medications: None       Physical Exam  Temp:  [36.8 °C (98.2 °F)] 36.8 °C (98.2 °F)  Pulse:  [80] 80  Resp:  [18] 18  BP: (125-128)/(72-77) 128/72  SpO2:  [96 %-98 %] 96 %    Physical Exam  Vitals signs and nursing note reviewed.   Constitutional:       General: He is not in acute distress.     Appearance: He is well-developed. He is not toxic-appearing or diaphoretic.   HENT:      Head: Normocephalic and atraumatic.      Right Ear: External ear normal.      Left Ear: External ear normal.      Nose: Nose normal. No congestion or rhinorrhea.      Mouth/Throat:      Mouth: Mucous membranes are moist.      Pharynx: No oropharyngeal exudate.   Eyes:      General: No scleral icterus.        Right eye: No discharge.         Left eye: No discharge.      Extraocular Movements: Extraocular movements intact.   Neck:      Musculoskeletal: Normal range of motion and neck supple. No edema or erythema.      Trachea: No tracheal deviation.   Cardiovascular:      Rate and Rhythm: Normal rate and regular rhythm.      Heart sounds: No murmur. No friction rub. No gallop.    Pulmonary:      Effort: Pulmonary effort is normal. No respiratory distress.      Breath sounds: Normal breath sounds. No stridor. No wheezing or rales.   Chest:      Chest wall: No tenderness.   Abdominal:      General: Bowel sounds are normal. There is no distension.      Palpations: Abdomen is soft.      Tenderness: There is no abdominal tenderness.   Musculoskeletal:         General: Tenderness (left hand with decreased ROM) present.      Right lower leg: No edema.      Left lower leg: No edema.   Lymphadenopathy:      Cervical: No cervical adenopathy.   Skin:     General: Skin is warm and dry.      Coloration: Skin is not jaundiced.      Findings: No erythema or rash.   Neurological:      General:  No focal deficit present.      Mental Status: He is alert and oriented to person, place, and time.      Cranial Nerves: No cranial nerve deficit.   Psychiatric:         Mood and Affect: Mood normal.         Behavior: Behavior normal.         Thought Content: Thought content normal.         Judgment: Judgment normal.         Laboratory:  Recent Labs     05/11/20  0837 05/12/20  1840   WBC 11.1* 11.3*   RBC 4.86 4.75   HEMOGLOBIN 14.4 14.2   HEMATOCRIT 43.3 42.6   MCV 89.1 89.7   MCH 29.6 29.9   MCHC 33.3* 33.3*   RDW 44.2 46.5   PLATELETCT 149* 156*   MPV 10.0 10.2     Recent Labs     05/11/20  0837 05/12/20  1840   SODIUM 140 141   POTASSIUM 4.6 4.4   CHLORIDE 104 105   CO2 22 21   GLUCOSE 329* 180*   BUN 29* 30*   CREATININE 1.27 1.37   CALCIUM 8.8 9.1     Recent Labs     05/11/20  0837 05/12/20  1840   ALTSGPT 16 20   ASTSGOT 13 15   ALKPHOSPHAT 51 50   TBILIRUBIN 0.9 0.5   GLUCOSE 329* 180*         No results for input(s): NTPROBNP in the last 72 hours.      No results for input(s): TROPONINT in the last 72 hours.    Imaging:  No orders to display         Assessment/Plan:    Cellulitis- (present on admission)  Assessment & Plan  -Failed outpatient antibiotics  -Located on his left hand  -Significantly worse from when it started  -Start IV vancomycin and Unasyn  -Await culture results  -No open wound    HTN (hypertension)- (present on admission)  Assessment & Plan  -Continue home lisinopril and Coreg  -Start PRN enalapril  -Adjust as needed    Diabetes mellitus with hyperglycemia (HCC)- (present on admission)  Assessment & Plan  -Continue home Lantus at 70 units per evening  -Start insulin sliding scale  -Adjust as needed    Chronic combined systolic and diastolic heart failure (HCC)- (present on admission)  Assessment & Plan  -No recent echocardiogram but he does have a history of an ejection fraction of 35%  -Avoid IV fluids  -No sign of significant overload at this time, no reason to repeat  echocardiogram    Leukocytosis- (present on admission)  Assessment & Plan  -Due to cellulitis  -Repeat CBC in the morning    Mixed hyperlipidemia- (present on admission)  Assessment & Plan  -Continue home statin

## 2020-05-13 NOTE — PROGRESS NOTES
"Pharmacy Kinetics 50 y.o. male on vancomycin day # 1 2020    Received Vancomycin 3,000 mg iv loading dose at 19:57PM  Provider specified end date: TBD    Indication for Treatment: L hand cellulitis     Pertinent history per medical record: Admitted on 2020 for SSTI.    The patient is a 51 y/o diabetic male who presented to the ER for evaluation of left hand swelling, pain, and erythema over his third knuckle. His symptoms began ~3 days ago and he was seen at urgent care and was reportedly prescribed Keflex. He reports worsening of sxs despite oral abx. He is afebrile.     Other antibiotics: ampicillin-sulbactam 3g IV Q6h     Allergies: Patient has no known allergies.     List concerns for renal function: BMI 32.79 kg/m^2, BUN/SCr ratio > 20:1    Pertinent cultures to date:   : Blood culture - in process   : Blood culture - in process     MRSA nares swab if pneumonia is a concern (ordered/positive/negative/n-a): n-a    Recent Labs     20  0837 20  1840   WBC 11.1* 11.3*   NEUTSPOLYS 79.70* 68.20     Recent Labs     20  0837 20  1840   BUN 29* 30*   CREATININE 1.27 1.37   ALBUMIN 4.1 4.1     Intake/Output Summary (Last 24 hours) at 2020  Last data filed at 2020  Gross per 24 hour   Intake 100 ml   Output --   Net 100 ml      /72   Pulse 80   Temp 36.8 °C (98.2 °F) (Temporal)   Resp 18   Ht 1.93 m (6' 4\")   Wt 122 kg (268 lb 15.4 oz)   SpO2 96%  Temp (24hrs), Av.8 °C (98.2 °F), Min:36.8 °C (98.2 °F), Max:36.8 °C (98.2 °F)    A/P   1. Vancomycin dose change: 2,000 mg IV Q12h (08:00, 20:00)  2. Next vancomycin level: Trough prior to the 3rd or 4th total dose (not yet ordered)   3. Goal trough: 12-16 mcg/mL   4. Comments: Vancomycin ~16 mg/kg IV Q12h ordered. Concerns for renal accumulation of vancomycin listed above. A trough will be obtained at steady state. Pharmacy will continue to follow and recommend de-escalation of antibiotics as " appropriate.     Umu Morgan, PharmD, BCPS

## 2020-05-13 NOTE — CARE PLAN
Problem: Communication  Goal: The ability to communicate needs accurately and effectively will improve  Outcome: PROGRESSING AS EXPECTED  Note: Encouraged pt to voice feelings     Problem: Safety  Goal: Will remain free from injury  Outcome: PROGRESSING AS EXPECTED  Note: Bed locked and in lowest position. Call light and belongings within reach.

## 2020-05-13 NOTE — ASSESSMENT & PLAN NOTE
-Failed outpatient antibiotics  -Located on his left hand   -MRI shows abscess  -Pending surgical I&D today.   -Follow cx  -Continue vancomycin.   -Ortho following.

## 2020-05-13 NOTE — ED NOTES
Patient resting on cart, awake, alert, oriented x 4. Patient updated on POC, verbalizes understanding. Assessment unchanged. Denies needs or questions, call light within reach, will continue to monitor. Patient remains on monitoring. Report given to receiving RNAshley.

## 2020-05-13 NOTE — PROGRESS NOTES
"Pharmacy Kinetics 50 y.o. male on vancomycin day # 2  5/13/2020    Currently Dose: Vancomycin 2000 mg iv q12hr (~16 mg/kg/dose)  Received Load Dose: Yes    Indication for Treatment: SSTI  Provider Specified End Date: No  ID Service Following: No    Pertinent history per medical record: Admitted on 5/12/2020 for concerns of SSTI of LUE (hand). Concerns for erythema, edema, and pain at site. Antibiotics initiated.    Other antibiotics: ampicillin/sulbactam 3 gm iv q6h    Allergies: Patient has no known allergies.     List concerns for accumulation of vancomycin: BMI ~33, BUN:SCr > 20:1, electrolyte derangement    Pertinent cultures to date:   Results     Procedure Component Value Units Date/Time    BLOOD CULTURE [[id number]] Collected:  05/12/20 1840    Order Status:  Completed Specimen:  Blood from Peripheral Updated:  05/13/20 0906     Significant Indicator NEG     Source BLD     Site PERIPHERAL     Culture Result No Growth  Note: Blood cultures are incubated for 5 days and  are monitored continuously.Positive blood cultures  are called to the RN and reported as soon as  they are identified.      Narrative:       Per Hospital Policy: Only change Specimen Src: to \"Line\" if  specified by physician order.  Right AC    BLOOD CULTURE [[id number]] Collected:  05/12/20 1912    Order Status:  Completed Specimen:  Blood from Peripheral Updated:  05/13/20 0906     Significant Indicator NEG     Source BLD     Site PERIPHERAL     Culture Result No Growth  Note: Blood cultures are incubated for 5 days and  are monitored continuously.Positive blood cultures  are called to the RN and reported as soon as  they are identified.      Narrative:       Per Hospital Policy: Only change Specimen Src: to \"Line\" if  specified by physician order.  No site indicated        MRSA nares swab if pneumonia is a concern (ordered/positive/negative/n-a): n/a    Recent Labs     05/11/20  0837 05/12/20 1840 05/13/20  0358   WBC 11.1* 11.3* 10.0 " "  NEUTSPOLYS 79.70* 68.20  --      Recent Labs     20  0837 20  1840 20  0358   BUN 29* 30* 26*   CREATININE 1.27 1.37 1.07   ALBUMIN 4.1 4.1  --      Intake/Output Summary (Last 24 hours) at 2020 1331  Last data filed at 2020 195  Gross per 24 hour   Intake 100 ml   Output --   Net 100 ml      /93   Pulse 76   Temp 36.4 °C (97.6 °F) (Temporal)   Resp 16   Ht 1.93 m (6' 4\")   Wt 122.2 kg (269 lb 6.4 oz)   SpO2 93%  Temp (24hrs), Av.6 °C (97.9 °F), Min:36.4 °C (97.6 °F), Max:36.8 °C (98.2 °F)    Estimated Creatinine Clearance: 119.3 mL/min (by C-G formula based on SCr of 1.07 mg/dL).    A/P   1. Vancomycin dose change: not indicated   2. Next vancomycin level: 20 @0730 (ordered)  3. Goal trough: 12-16 mcg/mL  4. Comments: VS stable. Afebrile. WBC improved. CrCl ~119 mL/min (SCr stable). Microbiology pending. Concerns for accumulation of vancomycin noted. Vancomycin level in place prior to AM dose 20 to assess clearance. BMP with AM labs per provider. Pharmacy will continue to follow.    Augie Jessica, PharmD  "

## 2020-05-13 NOTE — PROGRESS NOTES
Pt admitted from ED Red 3. Report received from DARIUSZ Naik. Pt on unit via gurney, ambulated to bed with steady gait. Assessment completed. Pt A&OX4, reported slight pain/discomfort to left hand. No s/s of dyspnea or respiratory distress, tolerating RA. IV vanco infusing, no adverse rx noted at this time. Call light within reach, personal belongings available, bed in lowest position, treaded socks on, and hourly rounding in place.

## 2020-05-13 NOTE — ED NOTES
Received report on patient. Patient resting in bed. No needs at this time will continue to monitor.

## 2020-05-13 NOTE — ASSESSMENT & PLAN NOTE
-No recent echocardiogram but he does have a history of an ejection fraction of 35%  -Avoid IV fluids  -No sign of significant overload at this time, no reason to repeat echocardiogram

## 2020-05-13 NOTE — ASSESSMENT & PLAN NOTE
-Better controlled on current regimen.   -Continue home Lantus at 70 units per evening  -Preprandial insulin to 30 units before meals.  -Continue insulin sliding scale  -Adjust as needed

## 2020-05-13 NOTE — DISCHARGE PLANNING
Care Transition Team Assessment      RN RENITA spoke with pt via phone to obtain information for this assessment. Pt verified the accuracy of the Facesheet.    Pt lives with his spouse and children in a condo located on the first floor (no stairs), at [address], [place] [address]. Pt is established with Dr. Ravi Mares for PCP. Pt uses the [facility] pharmacy on [address] [place] [address]. Prior to this hospitalization the pt was completely independent with ADLS/IADLS. Pt has a strong support system. Pt denies h/o SA and MH.     Information Source  Orientation : Oriented x 4  Information Given By: Patient  Who is responsible for making decisions for patient? : Patient    Readmission Evaluation  Is this a readmission?: No    Elopement Risk  Legal Hold: No  Ambulatory or Self Mobile in Wheelchair: Yes  Disoriented: No  Psychiatric Symptoms: None  History of Wandering: No  Elopement this Admit: No  Vocalizing Wanting to Leave: No  Displays Behaviors, Body Language Wanting to Leave: No-Not at Risk for Elopement  Elopement Risk: Not at Risk for Elopement    Interdisciplinary Discharge Planning  Patient or legal guardian wants to designate a caregiver (see row info): No    Discharge Preparedness  What is your plan after discharge?: Uncertain - pending medical team collaboration  What are your discharge supports?: Child, Spouse  Prior Functional Level: Ambulatory, Drives Self, Independent with Activities of Daily Living, Independent with Medication Management  Difficulity with ADLs: None  Difficulity with IADLs: None    Functional Assesment  Prior Functional Level: Ambulatory, Drives Self, Independent with Activities of Daily Living, Independent with Medication Management    Finances  Financial Barriers to Discharge: No  Prescription Coverage: Yes    Vision / Hearing Impairment  Vision Impairment : Yes  Right Eye Vision: Wears Contacts  Left Eye Vision: Wears Contacts  Hearing Impairment : No         Advance Directive  Advance  Directive?: None  Advance Directive offered?: AD Booklet given    Domestic Abuse  Have you ever been the victim of abuse or violence?: No  Physical Abuse or Sexual Abuse: No  Verbal Abuse or Emotional Abuse: No  Possible Abuse Reported to:: Not Applicable    Psychological Assessment  History of Substance Abuse: None  History of Psychiatric Problems: No  Non-compliant with Treatment: Yes  Newly Diagnosed Illness: Yes    Discharge Risks or Barriers  Discharge risks or barriers?: No    Anticipated Discharge Information  Anticipated discharge disposition: Discharge needs currently unknown  Discharge Address: ([address], [place] [address]. )  Discharge Contact Phone Number: [phone]

## 2020-05-13 NOTE — CARE PLAN
Problem: Infection  Goal: Will remain free from infection  Note: WBC=11.3, afebrile. Receiving IV Vanco and Unasyn.

## 2020-05-13 NOTE — PROGRESS NOTES
Received report and assumed care of pt. Assessment complete on RA. Pt A&OX4. Pt reports 2/10 left hand pain, medication given per MAR. All pt needs met at this time. Safety precautions and hourly rounding in place.

## 2020-05-14 ENCOUNTER — APPOINTMENT (OUTPATIENT)
Dept: RADIOLOGY | Facility: MEDICAL CENTER | Age: 50
DRG: 982 | End: 2020-05-14
Attending: HOSPITALIST
Payer: COMMERCIAL

## 2020-05-14 ENCOUNTER — APPOINTMENT (OUTPATIENT)
Dept: RADIOLOGY | Facility: MEDICAL CENTER | Age: 50
DRG: 982 | End: 2020-05-14
Attending: NURSE PRACTITIONER
Payer: COMMERCIAL

## 2020-05-14 LAB
ANION GAP SERPL CALC-SCNC: 11 MMOL/L (ref 7–16)
BASOPHILS # BLD AUTO: 0.6 % (ref 0–1.8)
BASOPHILS # BLD: 0.05 K/UL (ref 0–0.12)
BUN SERPL-MCNC: 22 MG/DL (ref 8–22)
CALCIUM SERPL-MCNC: 8.7 MG/DL (ref 8.5–10.5)
CHLORIDE SERPL-SCNC: 104 MMOL/L (ref 96–112)
CO2 SERPL-SCNC: 22 MMOL/L (ref 20–33)
CREAT SERPL-MCNC: 0.95 MG/DL (ref 0.5–1.4)
EOSINOPHIL # BLD AUTO: 0.26 K/UL (ref 0–0.51)
EOSINOPHIL NFR BLD: 3.2 % (ref 0–6.9)
ERYTHROCYTE [DISTWIDTH] IN BLOOD BY AUTOMATED COUNT: 44.9 FL (ref 35.9–50)
GLUCOSE BLD-MCNC: 150 MG/DL (ref 65–99)
GLUCOSE BLD-MCNC: 188 MG/DL (ref 65–99)
GLUCOSE BLD-MCNC: 194 MG/DL (ref 65–99)
GLUCOSE BLD-MCNC: 217 MG/DL (ref 65–99)
GLUCOSE BLD-MCNC: 72 MG/DL (ref 65–99)
GLUCOSE SERPL-MCNC: 211 MG/DL (ref 65–99)
HCT VFR BLD AUTO: 39.2 % (ref 42–52)
HGB BLD-MCNC: 13.1 G/DL (ref 14–18)
IMM GRANULOCYTES # BLD AUTO: 0.03 K/UL (ref 0–0.11)
IMM GRANULOCYTES NFR BLD AUTO: 0.4 % (ref 0–0.9)
LYMPHOCYTES # BLD AUTO: 1.68 K/UL (ref 1–4.8)
LYMPHOCYTES NFR BLD: 20.7 % (ref 22–41)
MCH RBC QN AUTO: 29.8 PG (ref 27–33)
MCHC RBC AUTO-ENTMCNC: 33.4 G/DL (ref 33.7–35.3)
MCV RBC AUTO: 89.3 FL (ref 81.4–97.8)
MONOCYTES # BLD AUTO: 0.88 K/UL (ref 0–0.85)
MONOCYTES NFR BLD AUTO: 10.9 % (ref 0–13.4)
NEUTROPHILS # BLD AUTO: 5.21 K/UL (ref 1.82–7.42)
NEUTROPHILS NFR BLD: 64.2 % (ref 44–72)
NRBC # BLD AUTO: 0 K/UL
NRBC BLD-RTO: 0 /100 WBC
PLATELET # BLD AUTO: 172 K/UL (ref 164–446)
PMV BLD AUTO: 9.6 FL (ref 9–12.9)
POTASSIUM SERPL-SCNC: 4 MMOL/L (ref 3.6–5.5)
RBC # BLD AUTO: 4.39 M/UL (ref 4.7–6.1)
SODIUM SERPL-SCNC: 137 MMOL/L (ref 135–145)
VANCOMYCIN TROUGH SERPL-MCNC: 16.2 UG/ML (ref 10–20)
WBC # BLD AUTO: 8.1 K/UL (ref 4.8–10.8)

## 2020-05-14 PROCEDURE — 80202 ASSAY OF VANCOMYCIN: CPT

## 2020-05-14 PROCEDURE — 700117 HCHG RX CONTRAST REV CODE 255: Performed by: NURSE PRACTITIONER

## 2020-05-14 PROCEDURE — 700111 HCHG RX REV CODE 636 W/ 250 OVERRIDE (IP): Performed by: HOSPITALIST

## 2020-05-14 PROCEDURE — 82962 GLUCOSE BLOOD TEST: CPT | Mod: 91

## 2020-05-14 PROCEDURE — 85025 COMPLETE CBC W/AUTO DIFF WBC: CPT

## 2020-05-14 PROCEDURE — 73220 MRI UPPR EXTREMITY W/O&W/DYE: CPT | Mod: LT

## 2020-05-14 PROCEDURE — 36415 COLL VENOUS BLD VENIPUNCTURE: CPT

## 2020-05-14 PROCEDURE — 700105 HCHG RX REV CODE 258: Performed by: HOSPITALIST

## 2020-05-14 PROCEDURE — 700102 HCHG RX REV CODE 250 W/ 637 OVERRIDE(OP): Performed by: HOSPITALIST

## 2020-05-14 PROCEDURE — 700105 HCHG RX REV CODE 258: Performed by: INTERNAL MEDICINE

## 2020-05-14 PROCEDURE — A9270 NON-COVERED ITEM OR SERVICE: HCPCS | Performed by: INTERNAL MEDICINE

## 2020-05-14 PROCEDURE — 80048 BASIC METABOLIC PNL TOTAL CA: CPT

## 2020-05-14 PROCEDURE — 770006 HCHG ROOM/CARE - MED/SURG/GYN SEMI*

## 2020-05-14 PROCEDURE — 700111 HCHG RX REV CODE 636 W/ 250 OVERRIDE (IP): Performed by: INTERNAL MEDICINE

## 2020-05-14 PROCEDURE — 700102 HCHG RX REV CODE 250 W/ 637 OVERRIDE(OP): Performed by: INTERNAL MEDICINE

## 2020-05-14 PROCEDURE — A9576 INJ PROHANCE MULTIPACK: HCPCS | Performed by: NURSE PRACTITIONER

## 2020-05-14 PROCEDURE — 99232 SBSQ HOSP IP/OBS MODERATE 35: CPT | Performed by: HOSPITALIST

## 2020-05-14 RX ADMIN — AMPICILLIN SODIUM AND SULBACTAM SODIUM 3 G: 2; 1 INJECTION, POWDER, FOR SOLUTION INTRAMUSCULAR; INTRAVENOUS at 00:02

## 2020-05-14 RX ADMIN — CARVEDILOL 25 MG: 25 TABLET, FILM COATED ORAL at 08:22

## 2020-05-14 RX ADMIN — HYDROCODONE BITARTRATE AND ACETAMINOPHEN 1 TABLET: 5; 325 TABLET ORAL at 12:43

## 2020-05-14 RX ADMIN — VANCOMYCIN HYDROCHLORIDE 2000 MG: 500 INJECTION, POWDER, LYOPHILIZED, FOR SOLUTION INTRAVENOUS at 08:22

## 2020-05-14 RX ADMIN — INSULIN HUMAN 3 UNITS: 100 INJECTION, SOLUTION PARENTERAL at 08:19

## 2020-05-14 RX ADMIN — SENNOSIDES-DOCUSATE SODIUM TAB 8.6-50 MG 2 TABLET: 8.6-5 TAB at 17:23

## 2020-05-14 RX ADMIN — CARVEDILOL 25 MG: 25 TABLET, FILM COATED ORAL at 17:23

## 2020-05-14 RX ADMIN — AMPICILLIN SODIUM AND SULBACTAM SODIUM 3 G: 2; 1 INJECTION, POWDER, FOR SOLUTION INTRAMUSCULAR; INTRAVENOUS at 06:14

## 2020-05-14 RX ADMIN — HYDROCODONE BITARTRATE AND ACETAMINOPHEN 1 TABLET: 5; 325 TABLET ORAL at 21:49

## 2020-05-14 RX ADMIN — GADOTERIDOL 25 ML: 279.3 INJECTION, SOLUTION INTRAVENOUS at 11:28

## 2020-05-14 RX ADMIN — HYDROCODONE BITARTRATE AND ACETAMINOPHEN 1 TABLET: 5; 325 TABLET ORAL at 00:09

## 2020-05-14 RX ADMIN — VANCOMYCIN HYDROCHLORIDE 1700 MG: 500 INJECTION, POWDER, LYOPHILIZED, FOR SOLUTION INTRAVENOUS at 21:41

## 2020-05-14 RX ADMIN — ATORVASTATIN CALCIUM 20 MG: 20 TABLET, FILM COATED ORAL at 21:38

## 2020-05-14 RX ADMIN — INSULIN GLARGINE 70 UNITS: 100 INJECTION, SOLUTION SUBCUTANEOUS at 17:21

## 2020-05-14 RX ADMIN — INSULIN LISPRO 3 UNITS: 100 INJECTION, SOLUTION INTRAVENOUS; SUBCUTANEOUS at 14:42

## 2020-05-14 RX ADMIN — LISINOPRIL 40 MG: 20 TABLET ORAL at 21:38

## 2020-05-14 ASSESSMENT — ENCOUNTER SYMPTOMS
TINGLING: 0
DOUBLE VISION: 0
BLURRED VISION: 0
MYALGIAS: 1
WEAKNESS: 0
FEVER: 0
ABDOMINAL PAIN: 0
DIARRHEA: 0
DIZZINESS: 0
SHORTNESS OF BREATH: 0
TREMORS: 0
SORE THROAT: 0
HEADACHES: 0
NAUSEA: 0
VOMITING: 0
EYE DISCHARGE: 0

## 2020-05-14 NOTE — PROGRESS NOTES
"Pharmacy Kinetics 50 y.o. male on vancomycin day # 3  2020    Currently on Vancomycin 2000 mg iv q12hr (800, )  Provider specified end date: TBD    Indication for Treatment: SSTI - hand cellulitis    Pertinent history per medical record: Admitted on 2020 for concerns of SSTI of LUE (hand).  Concerns for erythema, edema, and pain at site.  Antibiotics initiated.    Other antibiotics: none    Allergies: Patient has no known allergies.     List concerns for renal function: CHF, DM, BMI 33, contrast     Pertinent cultures to date:   20: blood (peripheral) - NGTD  20: blood (peripherla) - NGTD    Recent Labs     200 20  0358 20  0237   WBC 11.3* 10.0 8.1   NEUTSPOLYS 68.20  --  64.20     Recent Labs     208 20  0237   BUN 30* 26* 22   CREATININE 1.37 1.07 0.95   ALBUMIN 4.1  --   --      Recent Labs     20  0742   VANCOTROUGH 16.2      /75   Pulse 80   Temp 36.6 °C (97.9 °F) (Temporal)   Resp 16   Ht 1.93 m (6' 4\")   Wt 122.2 kg (269 lb 6.4 oz)   SpO2 93%  Temp (24hrs), Av.7 °C (98.1 °F), Min:36.2 °C (97.1 °F), Max:37.1 °C (98.7 °F)      A/P   1. Vancomycin dose change: 1700 mg IV q12h (00, )  2. Next vancomycin level:  @ 0830 (not yet ordered)  3. Goal trough: 12-16 mcg/ml  4. Comments: moderate concern for accumulation.  Level at the top of goal range.  Will decrease dosing slightly to 1700 mg (14 mg/kg) q12h and plan to recheck a level prior to 4th administration of new dose.  Pharmacy will continue to monitor and adjust dosing if needed.      Sheron Barton, PharmD  "

## 2020-05-14 NOTE — PROGRESS NOTES
..Pt tolerated MRI left hand scan well s issue(s). [employer] BiV-ICD set to DOO 80 per SANDRA Manzo rep.  HR, EKG, BP, pulse ox monitored during scan.   Pre-mri settings restored p MRI scan per SANDRA Manzo.   Bedside RN, Gifty, updated. VU.      Cardiac device report received from BS rep will be scanned in chart under media.

## 2020-05-14 NOTE — PROGRESS NOTES
Report received from day shift nurse. Assumed care @ 1900.     Patient is alert and oriented x 4. Able to make needs known. Assessment partially completed. On room air, tolerating well. Complains of minor pain on left hand with a rating of 1-2, declines PRN pain med at this time, ice pack provided per pt request. Patient educated regarding plan of care. Able to ambulate self with steady gait. , coverage given per MAR. Snack and fluids offered. On IV antibiotics as ordered.     Bed locked, in lowest position, treaded socks on. Needs attended. No further needs at this time. Communication board updated. Call light and personal belongings within reach.

## 2020-05-14 NOTE — PROGRESS NOTES
[facility] Medicine Daily Progress Note    Date of Service  5/14/2020    Chief Complaint  50 y.o. male admitted 5/12/2020 with left hand pain.    Hospital Course    This is a 50 y.o. male with a past medical history of diabetes, heart failure, hypertension, hyperlipidemia here with complaints of worsening left hand pain and swelling.  He is found to have left hand cellulitis.  He has failed outpatient antibiotics.  He has been started on IV antibiotics.  Monitoring for improvement.      Interval Problem Update  5/13:  Patient endorses improvement of left hand erythema.  Continues to have tenderness to area.  Afebrile.  VSS.  Denies shortness of breath or cough.   5/14:  X-ray shows soft tissue swelling.  MRI pending.  Improvement of surrounding erythema.  Middle knuckle continues to have significant swelling and erythema.  Limited mobility of fingers of left hand secondary to pain.      Consultants/Specialty  Ortho  ID    Code Status  FULL    Disposition  TBD.     Review of Systems  Review of Systems   Constitutional: Negative for fever and malaise/fatigue.   HENT: Negative for congestion and sore throat.    Eyes: Negative for blurred vision, double vision and discharge.   Respiratory: Negative for shortness of breath.    Cardiovascular: Negative for chest pain and leg swelling.   Gastrointestinal: Negative for abdominal pain, diarrhea, nausea and vomiting.   Genitourinary: Negative for dysuria and hematuria.   Musculoskeletal: Positive for joint pain and myalgias (left hand).   Skin: Negative for rash.        Swelling and erythema of left hand.    Neurological: Negative for dizziness, tingling, tremors, weakness and headaches.        Physical Exam  Temp:  [36.2 °C (97.1 °F)-37.1 °C (98.7 °F)] 36.6 °C (97.9 °F)  Pulse:  [64-79] 69  Resp:  [16-18] 16  BP: (121-151)/(72-94) 150/87  SpO2:  [91 %-95 %] 95 %    Physical Exam  Vitals signs and nursing note reviewed.   Constitutional:       General: He is not in acute  distress.     Appearance: Normal appearance. He is not toxic-appearing.   HENT:      Head: Normocephalic and atraumatic.      Nose: Nose normal.      Mouth/Throat:      Mouth: Mucous membranes are moist.      Pharynx: Oropharynx is clear.   Eyes:      General: No scleral icterus.     Conjunctiva/sclera: Conjunctivae normal.   Neck:      Musculoskeletal: Normal range of motion and neck supple. No neck rigidity.   Cardiovascular:      Rate and Rhythm: Normal rate and regular rhythm.      Pulses: Normal pulses.      Heart sounds: Normal heart sounds. No murmur.   Pulmonary:      Effort: Pulmonary effort is normal. No respiratory distress.      Breath sounds: Normal breath sounds. No wheezing.   Abdominal:      General: Bowel sounds are normal. There is no distension.      Palpations: Abdomen is soft.      Tenderness: There is no abdominal tenderness. There is no guarding.   Musculoskeletal: Normal range of motion.         General: Swelling and tenderness present.      Right lower leg: No edema.      Left lower leg: No edema.      Comments: Left hand swelling, erythema, and tenderness.  Limited mobility of fingers of left hand secondary to pain.    Skin:     General: Skin is warm and dry.      Coloration: Skin is not jaundiced.      Findings: No bruising.   Neurological:      General: No focal deficit present.      Mental Status: He is alert and oriented to person, place, and time. Mental status is at baseline.   Psychiatric:         Mood and Affect: Mood normal.         Behavior: Behavior normal.         Thought Content: Thought content normal.         Judgment: Judgment normal.         Fluids    Intake/Output Summary (Last 24 hours) at 5/14/2020 1015  Last data filed at 5/14/2020 0950  Gross per 24 hour   Intake 1320 ml   Output --   Net 1320 ml       Laboratory  Recent Labs     05/12/20  1840 05/13/20  0358 05/14/20  0237   WBC 11.3* 10.0 8.1   RBC 4.75 4.49* 4.39*   HEMOGLOBIN 14.2 13.3* 13.1*   HEMATOCRIT 42.6  40.4* 39.2*   MCV 89.7 90.0 89.3   MCH 29.9 29.6 29.8   MCHC 33.3* 32.9* 33.4*   RDW 46.5 45.3 44.9   PLATELETCT 156* 145* 172   MPV 10.2 10.0 9.6     Recent Labs     05/12/20  1840 05/13/20  0358 05/14/20  0237   SODIUM 141 132* 137   POTASSIUM 4.4 4.2 4.0   CHLORIDE 105 102 104   CO2 21 18* 22   GLUCOSE 180* 260* 211*   BUN 30* 26* 22   CREATININE 1.37 1.07 0.95   CALCIUM 9.1 8.9 8.7                   Imaging  DX-HAND 2- LEFT   Final Result      Dorsal soft tissue edema centered over the metacarpophalangeal joints on the lateral view. No acute bony findings.      MR-HAND - WITH & W/O LEFT    (Results Pending)        Assessment/Plan  Cellulitis- (present on admission)  Assessment & Plan  -Failed outpatient antibiotics  -Located on his left hand  -Currently improving on antibx.  -Discontinue Unasyn per ID as MRSA is suspected.  -Continue IV vancomycin   -X-ray shows soft tissue swelling.  -MRI pending  -Ortho following.     HTN (hypertension)- (present on admission)  Assessment & Plan  -Continue home lisinopril and Coreg  -Start PRN enalapril  -Adjust as needed    Diabetes mellitus with hyperglycemia (HCC)- (present on admission)  Assessment & Plan  -Continue home Lantus at 70 units per evening  -Increase preprandial insulin to 30 units before meals.  -Continue insulin sliding scale  -Adjust as needed    Chronic combined systolic and diastolic heart failure (HCC)- (present on admission)  Assessment & Plan  -No recent echocardiogram but he does have a history of an ejection fraction of 35%  -Avoid IV fluids  -No sign of significant overload at this time, no reason to repeat echocardiogram    Leukocytosis- (present on admission)  Assessment & Plan  -Due to cellulitis  -Resolved    Mixed hyperlipidemia- (present on admission)  Assessment & Plan  -Continue home statin       VTE prophylaxis: Lovenox.

## 2020-05-14 NOTE — PROGRESS NOTES
Received report and assumed care of pt. Assessment complete on RA. Pt A&OX4. Pt reports 3/10 left hand pain, medication given per MAR. Insulin changed from regular to lispro; waiting for pharmacy to supply to floor. All pt needs met at this time. Safety precautions and hourly rounding in place.

## 2020-05-14 NOTE — CARE PLAN
Problem: Pain Management  Goal: Pain level will decrease to patient's comfort goal  Outcome: PROGRESSING AS EXPECTED  Note: Utilize pain rating scale. Administer medications as ordered.     Problem: Communication  Goal: The ability to communicate needs accurately and effectively will improve  Outcome: PROGRESSING AS EXPECTED  Note: Encouraged pt to voice feelings

## 2020-05-15 ENCOUNTER — ANESTHESIA EVENT (OUTPATIENT)
Dept: SURGERY | Facility: MEDICAL CENTER | Age: 50
DRG: 982 | End: 2020-05-15
Payer: COMMERCIAL

## 2020-05-15 ENCOUNTER — ANESTHESIA (OUTPATIENT)
Dept: SURGERY | Facility: MEDICAL CENTER | Age: 50
DRG: 982 | End: 2020-05-15
Payer: COMMERCIAL

## 2020-05-15 LAB
ANION GAP SERPL CALC-SCNC: 12 MMOL/L (ref 7–16)
BASOPHILS # BLD AUTO: 0.6 % (ref 0–1.8)
BASOPHILS # BLD: 0.06 K/UL (ref 0–0.12)
BUN SERPL-MCNC: 19 MG/DL (ref 8–22)
CALCIUM SERPL-MCNC: 9.1 MG/DL (ref 8.5–10.5)
CHLORIDE SERPL-SCNC: 101 MMOL/L (ref 96–112)
CO2 SERPL-SCNC: 21 MMOL/L (ref 20–33)
COVID ORDER STATUS COVID19: NORMAL
CREAT SERPL-MCNC: 0.94 MG/DL (ref 0.5–1.4)
EKG IMPRESSION: NORMAL
EOSINOPHIL # BLD AUTO: 0.27 K/UL (ref 0–0.51)
EOSINOPHIL NFR BLD: 2.8 % (ref 0–6.9)
ERYTHROCYTE [DISTWIDTH] IN BLOOD BY AUTOMATED COUNT: 43.4 FL (ref 35.9–50)
GLUCOSE BLD-MCNC: 118 MG/DL (ref 65–99)
GLUCOSE BLD-MCNC: 149 MG/DL (ref 65–99)
GLUCOSE BLD-MCNC: 163 MG/DL (ref 65–99)
GLUCOSE BLD-MCNC: 239 MG/DL (ref 65–99)
GLUCOSE SERPL-MCNC: 217 MG/DL (ref 65–99)
HCT VFR BLD AUTO: 39.3 % (ref 42–52)
HGB BLD-MCNC: 13.1 G/DL (ref 14–18)
IMM GRANULOCYTES # BLD AUTO: 0.05 K/UL (ref 0–0.11)
IMM GRANULOCYTES NFR BLD AUTO: 0.5 % (ref 0–0.9)
LYMPHOCYTES # BLD AUTO: 1.91 K/UL (ref 1–4.8)
LYMPHOCYTES NFR BLD: 19.9 % (ref 22–41)
MCH RBC QN AUTO: 29.4 PG (ref 27–33)
MCHC RBC AUTO-ENTMCNC: 33.3 G/DL (ref 33.7–35.3)
MCV RBC AUTO: 88.1 FL (ref 81.4–97.8)
MONOCYTES # BLD AUTO: 0.92 K/UL (ref 0–0.85)
MONOCYTES NFR BLD AUTO: 9.6 % (ref 0–13.4)
NEUTROPHILS # BLD AUTO: 6.37 K/UL (ref 1.82–7.42)
NEUTROPHILS NFR BLD: 66.6 % (ref 44–72)
NRBC # BLD AUTO: 0 K/UL
NRBC BLD-RTO: 0 /100 WBC
PLATELET # BLD AUTO: 176 K/UL (ref 164–446)
PMV BLD AUTO: 9.6 FL (ref 9–12.9)
POTASSIUM SERPL-SCNC: 4.2 MMOL/L (ref 3.6–5.5)
RBC # BLD AUTO: 4.46 M/UL (ref 4.7–6.1)
SARS-COV-2 RNA RESP QL NAA+PROBE: NOTDETECTED
SODIUM SERPL-SCNC: 134 MMOL/L (ref 135–145)
SPECIMEN SOURCE: NORMAL
WBC # BLD AUTO: 9.6 K/UL (ref 4.8–10.8)

## 2020-05-15 PROCEDURE — 85025 COMPLETE CBC W/AUTO DIFF WBC: CPT

## 2020-05-15 PROCEDURE — 80048 BASIC METABOLIC PNL TOTAL CA: CPT

## 2020-05-15 PROCEDURE — 700102 HCHG RX REV CODE 250 W/ 637 OVERRIDE(OP): Performed by: INTERNAL MEDICINE

## 2020-05-15 PROCEDURE — 700105 HCHG RX REV CODE 258: Performed by: ANESTHESIOLOGY

## 2020-05-15 PROCEDURE — 501445 HCHG STAPLER, SKIN DISP: Performed by: ORTHOPAEDIC SURGERY

## 2020-05-15 PROCEDURE — 501838 HCHG SUTURE GENERAL: Performed by: ORTHOPAEDIC SURGERY

## 2020-05-15 PROCEDURE — 87077 CULTURE AEROBIC IDENTIFY: CPT

## 2020-05-15 PROCEDURE — U0004 COV-19 TEST NON-CDC HGH THRU: HCPCS

## 2020-05-15 PROCEDURE — 99233 SBSQ HOSP IP/OBS HIGH 50: CPT | Performed by: HOSPITALIST

## 2020-05-15 PROCEDURE — 93010 ELECTROCARDIOGRAM REPORT: CPT | Performed by: INTERNAL MEDICINE

## 2020-05-15 PROCEDURE — 0KBD0ZZ EXCISION OF LEFT HAND MUSCLE, OPEN APPROACH: ICD-10-PCS | Performed by: ORTHOPAEDIC SURGERY

## 2020-05-15 PROCEDURE — 93005 ELECTROCARDIOGRAM TRACING: CPT | Performed by: ORTHOPAEDIC SURGERY

## 2020-05-15 PROCEDURE — 700101 HCHG RX REV CODE 250: Performed by: ANESTHESIOLOGY

## 2020-05-15 PROCEDURE — 160048 HCHG OR STATISTICAL LEVEL 1-5: Performed by: ORTHOPAEDIC SURGERY

## 2020-05-15 PROCEDURE — 160002 HCHG RECOVERY MINUTES (STAT): Performed by: ORTHOPAEDIC SURGERY

## 2020-05-15 PROCEDURE — 700111 HCHG RX REV CODE 636 W/ 250 OVERRIDE (IP): Performed by: HOSPITALIST

## 2020-05-15 PROCEDURE — 87186 SC STD MICRODIL/AGAR DIL: CPT

## 2020-05-15 PROCEDURE — 770006 HCHG ROOM/CARE - MED/SURG/GYN SEMI*

## 2020-05-15 PROCEDURE — 87070 CULTURE OTHR SPECIMN AEROBIC: CPT

## 2020-05-15 PROCEDURE — 87075 CULTR BACTERIA EXCEPT BLOOD: CPT

## 2020-05-15 PROCEDURE — 700111 HCHG RX REV CODE 636 W/ 250 OVERRIDE (IP): Performed by: ANESTHESIOLOGY

## 2020-05-15 PROCEDURE — A9270 NON-COVERED ITEM OR SERVICE: HCPCS | Performed by: INTERNAL MEDICINE

## 2020-05-15 PROCEDURE — 160009 HCHG ANES TIME/MIN: Performed by: ORTHOPAEDIC SURGERY

## 2020-05-15 PROCEDURE — 160035 HCHG PACU - 1ST 60 MINS PHASE I: Performed by: ORTHOPAEDIC SURGERY

## 2020-05-15 PROCEDURE — A9270 NON-COVERED ITEM OR SERVICE: HCPCS | Performed by: HOSPITALIST

## 2020-05-15 PROCEDURE — 160028 HCHG SURGERY MINUTES - 1ST 30 MINS LEVEL 3: Performed by: ORTHOPAEDIC SURGERY

## 2020-05-15 PROCEDURE — 700102 HCHG RX REV CODE 250 W/ 637 OVERRIDE(OP): Performed by: HOSPITALIST

## 2020-05-15 PROCEDURE — 700105 HCHG RX REV CODE 258: Performed by: HOSPITALIST

## 2020-05-15 PROCEDURE — G2023 SPECIMEN COLLECT COVID-19: HCPCS | Performed by: PHYSICIAN ASSISTANT

## 2020-05-15 PROCEDURE — 82962 GLUCOSE BLOOD TEST: CPT

## 2020-05-15 PROCEDURE — 36415 COLL VENOUS BLD VENIPUNCTURE: CPT

## 2020-05-15 PROCEDURE — 87205 SMEAR GRAM STAIN: CPT

## 2020-05-15 RX ORDER — DIPHENHYDRAMINE HYDROCHLORIDE 50 MG/ML
12.5 INJECTION INTRAMUSCULAR; INTRAVENOUS
Status: DISCONTINUED | OUTPATIENT
Start: 2020-05-15 | End: 2020-05-15 | Stop reason: HOSPADM

## 2020-05-15 RX ORDER — LIDOCAINE HYDROCHLORIDE 20 MG/ML
INJECTION, SOLUTION EPIDURAL; INFILTRATION; INTRACAUDAL; PERINEURAL PRN
Status: DISCONTINUED | OUTPATIENT
Start: 2020-05-15 | End: 2020-05-15 | Stop reason: SURG

## 2020-05-15 RX ORDER — MEPERIDINE HYDROCHLORIDE 25 MG/ML
6.25 INJECTION INTRAMUSCULAR; INTRAVENOUS; SUBCUTANEOUS
Status: DISCONTINUED | OUTPATIENT
Start: 2020-05-15 | End: 2020-05-15 | Stop reason: HOSPADM

## 2020-05-15 RX ORDER — SODIUM CHLORIDE, SODIUM LACTATE, POTASSIUM CHLORIDE, CALCIUM CHLORIDE 600; 310; 30; 20 MG/100ML; MG/100ML; MG/100ML; MG/100ML
INJECTION, SOLUTION INTRAVENOUS
Status: DISCONTINUED | OUTPATIENT
Start: 2020-05-15 | End: 2020-05-15 | Stop reason: SURG

## 2020-05-15 RX ORDER — ONDANSETRON 2 MG/ML
4 INJECTION INTRAMUSCULAR; INTRAVENOUS
Status: DISCONTINUED | OUTPATIENT
Start: 2020-05-15 | End: 2020-05-15 | Stop reason: HOSPADM

## 2020-05-15 RX ORDER — OXYCODONE HYDROCHLORIDE 5 MG/1
5-10 TABLET ORAL EVERY 6 HOURS PRN
Status: DISCONTINUED | OUTPATIENT
Start: 2020-05-15 | End: 2020-05-16 | Stop reason: HOSPADM

## 2020-05-15 RX ORDER — SODIUM CHLORIDE, SODIUM LACTATE, POTASSIUM CHLORIDE, CALCIUM CHLORIDE 600; 310; 30; 20 MG/100ML; MG/100ML; MG/100ML; MG/100ML
INJECTION, SOLUTION INTRAVENOUS CONTINUOUS
Status: DISCONTINUED | OUTPATIENT
Start: 2020-05-15 | End: 2020-05-15 | Stop reason: HOSPADM

## 2020-05-15 RX ORDER — HALOPERIDOL 5 MG/ML
1 INJECTION INTRAMUSCULAR
Status: DISCONTINUED | OUTPATIENT
Start: 2020-05-15 | End: 2020-05-15 | Stop reason: HOSPADM

## 2020-05-15 RX ORDER — MORPHINE SULFATE 4 MG/ML
4 INJECTION, SOLUTION INTRAMUSCULAR; INTRAVENOUS ONCE
Status: COMPLETED | OUTPATIENT
Start: 2020-05-15 | End: 2020-05-15

## 2020-05-15 RX ORDER — MIDAZOLAM HYDROCHLORIDE 1 MG/ML
INJECTION INTRAMUSCULAR; INTRAVENOUS PRN
Status: DISCONTINUED | OUTPATIENT
Start: 2020-05-15 | End: 2020-05-15 | Stop reason: SURG

## 2020-05-15 RX ORDER — OXYCODONE HYDROCHLORIDE AND ACETAMINOPHEN 5; 325 MG/1; MG/1
1 TABLET ORAL
Status: DISCONTINUED | OUTPATIENT
Start: 2020-05-15 | End: 2020-05-15 | Stop reason: HOSPADM

## 2020-05-15 RX ORDER — OXYCODONE HYDROCHLORIDE AND ACETAMINOPHEN 5; 325 MG/1; MG/1
2 TABLET ORAL
Status: DISCONTINUED | OUTPATIENT
Start: 2020-05-15 | End: 2020-05-15 | Stop reason: HOSPADM

## 2020-05-15 RX ADMIN — SODIUM CHLORIDE, POTASSIUM CHLORIDE, SODIUM LACTATE AND CALCIUM CHLORIDE: 600; 310; 30; 20 INJECTION, SOLUTION INTRAVENOUS at 13:34

## 2020-05-15 RX ADMIN — VANCOMYCIN HYDROCHLORIDE 1700 MG: 500 INJECTION, POWDER, LYOPHILIZED, FOR SOLUTION INTRAVENOUS at 21:08

## 2020-05-15 RX ADMIN — HYDROCODONE BITARTRATE AND ACETAMINOPHEN 1 TABLET: 5; 325 TABLET ORAL at 08:10

## 2020-05-15 RX ADMIN — CARVEDILOL 25 MG: 25 TABLET, FILM COATED ORAL at 16:53

## 2020-05-15 RX ADMIN — VANCOMYCIN HYDROCHLORIDE 1700 MG: 500 INJECTION, POWDER, LYOPHILIZED, FOR SOLUTION INTRAVENOUS at 08:10

## 2020-05-15 RX ADMIN — FENTANYL CITRATE 50 MCG: 50 INJECTION INTRAMUSCULAR; INTRAVENOUS at 13:36

## 2020-05-15 RX ADMIN — LISINOPRIL 40 MG: 20 TABLET ORAL at 21:01

## 2020-05-15 RX ADMIN — HYDROCODONE BITARTRATE AND ACETAMINOPHEN 1 TABLET: 5; 325 TABLET ORAL at 16:53

## 2020-05-15 RX ADMIN — MORPHINE SULFATE 4 MG: 4 INJECTION INTRAVENOUS at 21:05

## 2020-05-15 RX ADMIN — ATORVASTATIN CALCIUM 20 MG: 20 TABLET, FILM COATED ORAL at 21:02

## 2020-05-15 RX ADMIN — CARVEDILOL 25 MG: 25 TABLET, FILM COATED ORAL at 08:10

## 2020-05-15 RX ADMIN — FENTANYL CITRATE 50 MCG: 50 INJECTION INTRAMUSCULAR; INTRAVENOUS at 13:40

## 2020-05-15 RX ADMIN — OXYCODONE 5 MG: 5 TABLET ORAL at 23:52

## 2020-05-15 RX ADMIN — INSULIN LISPRO 4 UNITS: 100 INJECTION, SOLUTION INTRAVENOUS; SUBCUTANEOUS at 16:59

## 2020-05-15 RX ADMIN — LIDOCAINE HYDROCHLORIDE 5 ML: 20 INJECTION, SOLUTION EPIDURAL; INFILTRATION; INTRACAUDAL at 13:39

## 2020-05-15 RX ADMIN — MIDAZOLAM HYDROCHLORIDE 2 MG: 1 INJECTION, SOLUTION INTRAMUSCULAR; INTRAVENOUS at 13:35

## 2020-05-15 RX ADMIN — INSULIN GLARGINE 70 UNITS: 100 INJECTION, SOLUTION SUBCUTANEOUS at 16:59

## 2020-05-15 ASSESSMENT — ENCOUNTER SYMPTOMS
VOMITING: 0
CHILLS: 0
FEVER: 0
COUGH: 0
NAUSEA: 0
HEADACHES: 0
ABDOMINAL PAIN: 0
SHORTNESS OF BREATH: 0
DIZZINESS: 0
CONSTIPATION: 0
TINGLING: 0
PALPITATIONS: 0
DOUBLE VISION: 0
MYALGIAS: 1
TREMORS: 0
BLURRED VISION: 0
BACK PAIN: 0
DIARRHEA: 0

## 2020-05-15 NOTE — CARE PLAN
Problem: Pain Management  Goal: Pain level will decrease to patient's comfort goal  Outcome: PROGRESSING AS EXPECTED     Problem: Communication  Goal: The ability to communicate needs accurately and effectively will improve  Outcome: PROGRESSING AS EXPECTED     Problem: Safety  Goal: Will remain free from injury  Outcome: PROGRESSING AS EXPECTED     Problem: Infection  Goal: Will remain free from infection  Outcome: PROGRESSING AS EXPECTED     Problem: Venous Thromboembolism (VTW)/Deep Vein Thrombosis (DVT) Prevention:  Goal: Patient will participate in Venous Thrombosis (VTE)/Deep Vein Thrombosis (DVT)Prevention Measures  Outcome: PROGRESSING AS EXPECTED     Problem: Bowel/Gastric:  Goal: Normal bowel function is maintained or improved  Outcome: PROGRESSING AS EXPECTED     Problem: Knowledge Deficit  Goal: Knowledge of disease process/condition, treatment plan, diagnostic tests, and medications will improve  Outcome: PROGRESSING AS EXPECTED     Problem: Discharge Barriers/Planning  Goal: Patient's continuum of care needs will be met  Outcome: PROGRESSING AS EXPECTED

## 2020-05-15 NOTE — ANESTHESIA PREPROCEDURE EVALUATION
Left hand cellulitis    Relevant Problems   ANESTHESIA   (+) Sleep apnea      CARDIAC   (+) HTN (hypertension)   (+) LBBB (left bundle branch block)   (+) PVC (premature ventricular contraction)      Other   (+) Chronic combined systolic and diastolic heart failure (HCC)   (+) Diabetes mellitus with hyperglycemia (HCC)   (+) Mixed hyperlipidemia   EKG: V-paced  ECHO 2018 EF 35%  Non-ischemic cardiomyopathy  History of VT in 2017-had AICD placed. Followed by [facility]  COVID negative    Physical Exam    Airway   Mallampati: II  TM distance: >3 FB  Neck ROM: full       Cardiovascular - normal exam  Rhythm: regular  Rate: normal  (-) murmur     Dental - normal exam      Very poor dentition   Pulmonary - normal exam  Breath sounds clear to auscultation     Abdominal    Neurological - normal exam               Anesthesia Plan    ASA 3- EMERGENT       Plan - MAC             Induction: intravenous    Postoperative Plan: Postoperative administration of opioids is intended.    Pertinent diagnostic labs and testing reviewed    Informed Consent:    Anesthetic plan and risks discussed with patient.    Use of blood products discussed with: patient whom consented to blood products.
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
## 2020-05-15 NOTE — PROGRESS NOTES
Patient on bed resting. Alert and oriented x 4. Able to make needs known. Assessment partially completed. Complained of mild pain on left hand at time, ice pack provided per pt request. Patient educated regarding plan of care.    Bed locked, in lowest position, treaded socks on. Needs attended. No further needs at this time. Communication board updated. Call light and personal belongings within reach.

## 2020-05-15 NOTE — ANESTHESIA POSTPROCEDURE EVALUATION
Patient: Richard Franklin    Procedure Summary     Date:  05/15/20 Room / Location:  [place] [address] / SURGERY [facility]    Anesthesia Start:  1334 Anesthesia Stop:  1353    Procedure:  IRRIGATION AND DEBRIDEMENT, WOUND (Left Hand) Diagnosis:  ( small hand abscess )    Surgeon:  Franklin Tucker M.D. Responsible Provider:  Fazal Gary M.D.    Anesthesia Type:  general ASA Status:  3 - Emergent          Final Anesthesia Type: general  Last vitals  BP   Blood Pressure: 104/66    Temp   36.6 °C (97.8 °F)    Pulse   Pulse: 67   Resp   14    SpO2   94 %      Anesthesia Post Evaluation    Patient location during evaluation: PACU  Patient participation: complete - patient participated  Level of consciousness: awake and alert    Airway patency: patent  Anesthetic complications: no  Cardiovascular status: hemodynamically stable  Respiratory status: acceptable  Hydration status: euvolemic    PONV: none           Nurse Pain Score: 0 (NPRS)

## 2020-05-15 NOTE — PROGRESS NOTES
Rounded on patient. He is prepared for surgery at 1:45 today.  Addressed needs/concerns. All questions answered. Patient was in good spirits and hopeful for d/c tomorrow.

## 2020-05-15 NOTE — ANESTHESIA QCDR
2019 [facility] Clinical Data Registry (for Quality Improvement)     Postoperative nausea/vomiting risk protocol (Adult = 18 yrs and Pediatric 3-17 yrs)- (430 and 463)  General inhalation anesthetic (NOT TIVA) with PONV risk factors: No  Provision of anti-emetic therapy with at least 2 different classes of agents: N/A  Patient DID NOT receive anti-emetic therapy and reason is documented in Medical Record: N/A    Multimodal Pain Management- (477)  Non-emergent surgery AND patient age >= 18: No  Use of Multimodal Pain Management, two or more drugs and/or interventions, NOT including systemic opioids:   Exception: Documented allergy to multiple classes of analgesics:     Smoking Abstinence (404)  Patient is current smoker (cigarette, pipe, e-cig, marijuanna): No  Elective Surgery:   Abstinence instructions provided prior to day of surgery:   Patient abstained from smoking on day of surgery:     Pre-Op Beta-Blocker in Isolated CABG (44)  Isolated CABG AND patient age >= 18:   Beta-blocker admin within 24 hours of surgical incision:   Exception:of medical reason(s) for not administering beta blocker within 24 hours prior to surgical incision (e.g., not  indicated,other medical reason):     PACU assessment of acute postoperative pain prior to Anesthesia Care End- Applies to Patients Age = 18- (ABG7)  Initial PACU pain score is which of the following: < 7/10  Patient unable to report pain score: N/A    Post-anesthetic transfer of care checklist/protocol to PACU/ICU- (426 and 427)  Upon conclusion of case, patient transferred to which of the following locations: PACU/Non-ICU  Use of transfer checklist/protocol: Yes  Exclusion: Service Performed in Patient Hospital Room (and thus did not require transfer): N/A  Unplanned admission to ICU related to anesthesia service up through end of PACU care- (MD51)  Unplanned admission to ICU (not initially anticipated at anesthesia start time): No

## 2020-05-15 NOTE — PROGRESS NOTES
"Pharmacy Kinetics 50 y.o. male on vancomycin day # 4  5/15/2020    Currently on Vancomycin 1700 mg iv q12hr  Provider specified end date: TBD    Indication for Treatment: SSTI - hand cellulitis     Pertinent history per medical record: Admitted on 2020 for concerns of SSTI of LUE (hand).  Concerns for erythema, edema, and pain at site.  Antibiotics initiated. Ortho to do I+D of hand on 5/15. ID to be consulted.    Other antibiotics: None    Allergies: Patient has no known allergies.     List concerns for renal function: CHF, DM, BMI 33, contrast     Pertinent cultures to date:   20: PBC x 2 = NGTD  20: PBC x 2 = NGTD    MRSA nares swab if pneumonia is a concern (ordered/positive/negative/n-a): n/a    Recent Labs     20  1840 20  0358 20  0237 05/15/20  0225   WBC 11.3* 10.0 8.1 9.6   NEUTSPOLYS 68.20  --  64.20 66.60     Recent Labs     20  1840 20  0358 20  0237 05/15/20  0225   BUN 30* 26* 22 19   CREATININE 1.37 1.07 0.95 0.94   ALBUMIN 4.1  --   --   --      Recent Labs     20  0742   VANCOTROUGH 16.2       Intake/Output Summary (Last 24 hours) at 5/15/2020 1505  Last data filed at 5/15/2020 1400  Gross per 24 hour   Intake 870 ml   Output 5 ml   Net 865 ml      /66   Pulse 67   Temp 36.6 °C (97.8 °F) (Temporal)   Resp 14   Ht 1.93 m (6' 4\")   Wt 122.2 kg (269 lb 6.4 oz)   SpO2 94%  Temp (24hrs), Av.5 °C (97.7 °F), Min:36.2 °C (97.2 °F), Max:36.9 °C (98.5 °F)      A/P   1. Vancomycin dose change: No  2. Next vancomycin level: 0830 on   3. Goal trough: 12-16 mcg/ml  4. Comments: Vanco dose decreased last night due to supratherapeutic level. Will check a vanco trough before tomorrow morning's dose to make sure it is in the goal range. Pt to OR today for I+D of hand, await culture results. Per Hospitalist APN note, ID to consult.     Jose Francisco Jessica, PharmD  "

## 2020-05-15 NOTE — OR NURSING
1352 - pt arrival to PACU bay 7A via bed from OR. Pt attached to monitors, vital signs stable. Report received from OR RN and Anesthesia.    1400 - pt awake and oriented, denies pain at this time.    1420 - report called to Florence ARZOLA, request for transport placed.    1430 - pt transfer via bed with transport to room.

## 2020-05-15 NOTE — ANESTHESIA TIME REPORT
Anesthesia Start and Stop Event Times     Date Time Event    5/15/2020 1318 Ready for Procedure     1334 Anesthesia Start     1353 Anesthesia Stop        Responsible Staff  05/15/20    Name Role Begin End    Fazal Gary M.D. Anesth 1334 1353        Preop Diagnosis (Free Text):  Pre-op Diagnosis      small hand abscess         Preop Diagnosis (Codes):    Post op Diagnosis  Laceration of left hand with infection      Premium Reason  Non-Premium    Comments:

## 2020-05-15 NOTE — PROGRESS NOTES
[facility] Medicine Daily Progress Note    Date of Service  5/15/2020    Chief Complaint  50 y.o. male admitted 5/12/2020 with left hand pain.    Hospital Course    This is a 50 y.o. male with a past medical history of diabetes, heart failure, hypertension, hyperlipidemia here with complaints of worsening left hand pain and swelling.  He is found to have left hand cellulitis.  He has failed outpatient antibiotics.  He has been started on IV antibiotics.  Monitoring for improvement.      Interval Problem Update  5/13:  Patient endorses improvement of left hand erythema.  Continues to have tenderness to area.  Afebrile.  VSS.  Denies shortness of breath or cough.   5/14:  X-ray shows soft tissue swelling.  MRI pending.  Improvement of surrounding erythema.  Middle knuckle continues to have significant swelling and erythema.  Limited mobility of fingers of left hand secondary to pain.    5/15:  MRI showing abscess.  Pending surgical I&D today.  ID consulted.  BG better controlled. Patient upset due to not being updated about MRI results and surgery.  Comforted patient.     Consultants/Specialty  Ortho  ID    Code Status  FULL    Disposition  TBD.     Review of Systems  Review of Systems   Constitutional: Negative for chills, fever and malaise/fatigue.   HENT: Negative for congestion.    Eyes: Negative for blurred vision and double vision.   Respiratory: Negative for cough and shortness of breath.    Cardiovascular: Negative for chest pain, palpitations and leg swelling.   Gastrointestinal: Negative for abdominal pain, constipation, diarrhea, nausea and vomiting.   Genitourinary: Negative for dysuria.   Musculoskeletal: Positive for joint pain and myalgias (left hand). Negative for back pain.   Skin:        Swelling and erythema of left hand.    Neurological: Negative for dizziness, tingling, tremors and headaches.        Physical Exam  Temp:  [36.2 °C (97.2 °F)-36.9 °C (98.5 °F)] 36.2 °C (97.2 °F)  Pulse:  [66-89]  66  Resp:  [16-17] 16  BP: (110-149)/(65-92) 147/67  SpO2:  [93 %-98 %] 98 %    Physical Exam  Vitals signs and nursing note reviewed.   Constitutional:       General: He is not in acute distress.     Appearance: Normal appearance. He is not ill-appearing.   HENT:      Head: Normocephalic and atraumatic.      Nose: Nose normal. No congestion.      Mouth/Throat:      Mouth: Mucous membranes are moist.      Pharynx: Oropharynx is clear. No oropharyngeal exudate.   Eyes:      General:         Right eye: No discharge.         Left eye: No discharge.      Conjunctiva/sclera: Conjunctivae normal.   Neck:      Musculoskeletal: Normal range of motion and neck supple. No neck rigidity or muscular tenderness.   Cardiovascular:      Rate and Rhythm: Normal rate and regular rhythm.      Pulses: Normal pulses.      Heart sounds: Normal heart sounds.   Pulmonary:      Effort: Pulmonary effort is normal. No respiratory distress.      Breath sounds: Normal breath sounds. No wheezing or rales.   Abdominal:      General: Bowel sounds are normal. There is no distension.      Palpations: Abdomen is soft.      Tenderness: There is no abdominal tenderness.   Musculoskeletal: Normal range of motion.         General: Swelling and tenderness present.      Comments: Left hand swelling, erythema, and tenderness.  Limited mobility of fingers of left hand secondary to pain.    Skin:     General: Skin is warm and dry.      Coloration: Skin is not jaundiced or pale.   Neurological:      General: No focal deficit present.      Mental Status: He is alert and oriented to person, place, and time. Mental status is at baseline.      Cranial Nerves: No cranial nerve deficit.   Psychiatric:         Mood and Affect: Mood normal.         Behavior: Behavior normal.         Thought Content: Thought content normal.         Judgment: Judgment normal.         Fluids    Intake/Output Summary (Last 24 hours) at 5/15/2020 1004  Last data filed at 5/14/2020  1945  Gross per 24 hour   Intake 1240 ml   Output --   Net 1240 ml       Laboratory  Recent Labs     05/13/20 0358 05/14/20  0237 05/15/20  0225   WBC 10.0 8.1 9.6   RBC 4.49* 4.39* 4.46*   HEMOGLOBIN 13.3* 13.1* 13.1*   HEMATOCRIT 40.4* 39.2* 39.3*   MCV 90.0 89.3 88.1   MCH 29.6 29.8 29.4   MCHC 32.9* 33.4* 33.3*   RDW 45.3 44.9 43.4   PLATELETCT 145* 172 176   MPV 10.0 9.6 9.6     Recent Labs     05/13/20  0358 05/14/20  0237 05/15/20  0225   SODIUM 132* 137 134*   POTASSIUM 4.2 4.0 4.2   CHLORIDE 102 104 101   CO2 18* 22 21   GLUCOSE 260* 211* 217*   BUN 26* 22 19   CREATININE 1.07 0.95 0.94   CALCIUM 8.9 8.7 9.1                   Imaging  MR-HAND - WITH & W/O LEFT   Final Result      1.  No evidence of osteomyelitis.      2.  Fluid collection within the subcutaneous fat dorsal to the third MCP joint and at the third extensor tendon which measures 15 x 13 x 4 mm in size likely representing a subcutaneous abscess.      3.  Cellulitis.      DX-HAND 2- LEFT   Final Result      Dorsal soft tissue edema centered over the metacarpophalangeal joints on the lateral view. No acute bony findings.           Assessment/Plan  Cellulitis- (present on admission)  Assessment & Plan  -Failed outpatient antibiotics  -Located on his left hand   -MRI shows abscess  -Pending surgical I&D today.   -Follow cx  -Continue vancomycin.   -Ortho following.     HTN (hypertension)- (present on admission)  Assessment & Plan  -Continue home lisinopril and Coreg  -Start PRN enalapril  -Adjust as needed    Diabetes mellitus with hyperglycemia (HCC)- (present on admission)  Assessment & Plan  -Better controlled on current regimen.   -Continue home Lantus at 70 units per evening  -Preprandial insulin to 30 units before meals.  -Continue insulin sliding scale  -Adjust as needed    Chronic combined systolic and diastolic heart failure (HCC)- (present on admission)  Assessment & Plan  -No recent echocardiogram but he does have a history of an  ejection fraction of 35%  -Avoid IV fluids  -No sign of significant overload at this time, no reason to repeat echocardiogram    Leukocytosis- (present on admission)  Assessment & Plan  -Due to cellulitis  -Resolved    Mixed hyperlipidemia- (present on admission)  Assessment & Plan  -Continue home statin       VTE prophylaxis: Lovenox.

## 2020-05-16 VITALS
RESPIRATION RATE: 17 BRPM | BODY MASS INDEX: 32.81 KG/M2 | DIASTOLIC BLOOD PRESSURE: 86 MMHG | SYSTOLIC BLOOD PRESSURE: 139 MMHG | WEIGHT: 269.4 LBS | OXYGEN SATURATION: 98 % | HEART RATE: 73 BPM | TEMPERATURE: 97.6 F | HEIGHT: 76 IN

## 2020-05-16 PROBLEM — Z98.890 STATUS POST INCISION AND DRAINAGE: Status: ACTIVE | Noted: 2020-05-16

## 2020-05-16 PROBLEM — D72.829 LEUKOCYTOSIS: Status: RESOLVED | Noted: 2020-05-13 | Resolved: 2020-05-16

## 2020-05-16 LAB
ANION GAP SERPL CALC-SCNC: 14 MMOL/L (ref 7–16)
BACTERIA BLD CULT: NORMAL
BACTERIA BLD CULT: NORMAL
BASOPHILS # BLD AUTO: 0.6 % (ref 0–1.8)
BASOPHILS # BLD: 0.06 K/UL (ref 0–0.12)
BUN SERPL-MCNC: 19 MG/DL (ref 8–22)
CALCIUM SERPL-MCNC: 9.1 MG/DL (ref 8.5–10.5)
CHLORIDE SERPL-SCNC: 104 MMOL/L (ref 96–112)
CO2 SERPL-SCNC: 20 MMOL/L (ref 20–33)
CREAT SERPL-MCNC: 1.04 MG/DL (ref 0.5–1.4)
EOSINOPHIL # BLD AUTO: 0.3 K/UL (ref 0–0.51)
EOSINOPHIL NFR BLD: 3.1 % (ref 0–6.9)
ERYTHROCYTE [DISTWIDTH] IN BLOOD BY AUTOMATED COUNT: 45.4 FL (ref 35.9–50)
GLUCOSE BLD-MCNC: 155 MG/DL (ref 65–99)
GLUCOSE BLD-MCNC: 174 MG/DL (ref 65–99)
GLUCOSE SERPL-MCNC: 170 MG/DL (ref 65–99)
HCT VFR BLD AUTO: 41 % (ref 42–52)
HGB BLD-MCNC: 13.3 G/DL (ref 14–18)
IMM GRANULOCYTES # BLD AUTO: 0.06 K/UL (ref 0–0.11)
IMM GRANULOCYTES NFR BLD AUTO: 0.6 % (ref 0–0.9)
LYMPHOCYTES # BLD AUTO: 2.37 K/UL (ref 1–4.8)
LYMPHOCYTES NFR BLD: 24.2 % (ref 22–41)
MCH RBC QN AUTO: 29.7 PG (ref 27–33)
MCHC RBC AUTO-ENTMCNC: 32.4 G/DL (ref 33.7–35.3)
MCV RBC AUTO: 91.5 FL (ref 81.4–97.8)
MONOCYTES # BLD AUTO: 1.13 K/UL (ref 0–0.85)
MONOCYTES NFR BLD AUTO: 11.5 % (ref 0–13.4)
NEUTROPHILS # BLD AUTO: 5.87 K/UL (ref 1.82–7.42)
NEUTROPHILS NFR BLD: 60 % (ref 44–72)
NRBC # BLD AUTO: 0 K/UL
NRBC BLD-RTO: 0 /100 WBC
PLATELET # BLD AUTO: 183 K/UL (ref 164–446)
PMV BLD AUTO: 9.7 FL (ref 9–12.9)
POTASSIUM SERPL-SCNC: 4.1 MMOL/L (ref 3.6–5.5)
RBC # BLD AUTO: 4.48 M/UL (ref 4.7–6.1)
SIGNIFICANT IND 70042: NORMAL
SIGNIFICANT IND 70042: NORMAL
SITE SITE: NORMAL
SITE SITE: NORMAL
SODIUM SERPL-SCNC: 138 MMOL/L (ref 135–145)
SOURCE SOURCE: NORMAL
SOURCE SOURCE: NORMAL
VANCOMYCIN TROUGH SERPL-MCNC: 16 UG/ML (ref 10–20)
WBC # BLD AUTO: 9.8 K/UL (ref 4.8–10.8)

## 2020-05-16 PROCEDURE — A9270 NON-COVERED ITEM OR SERVICE: HCPCS | Performed by: INTERNAL MEDICINE

## 2020-05-16 PROCEDURE — A9270 NON-COVERED ITEM OR SERVICE: HCPCS | Performed by: NURSE PRACTITIONER

## 2020-05-16 PROCEDURE — A9270 NON-COVERED ITEM OR SERVICE: HCPCS | Performed by: HOSPITALIST

## 2020-05-16 PROCEDURE — 85025 COMPLETE CBC W/AUTO DIFF WBC: CPT

## 2020-05-16 PROCEDURE — 82962 GLUCOSE BLOOD TEST: CPT

## 2020-05-16 PROCEDURE — 700102 HCHG RX REV CODE 250 W/ 637 OVERRIDE(OP): Performed by: INTERNAL MEDICINE

## 2020-05-16 PROCEDURE — 700111 HCHG RX REV CODE 636 W/ 250 OVERRIDE (IP): Performed by: HOSPITALIST

## 2020-05-16 PROCEDURE — 700102 HCHG RX REV CODE 250 W/ 637 OVERRIDE(OP): Performed by: NURSE PRACTITIONER

## 2020-05-16 PROCEDURE — 99223 1ST HOSP IP/OBS HIGH 75: CPT | Performed by: INTERNAL MEDICINE

## 2020-05-16 PROCEDURE — 700102 HCHG RX REV CODE 250 W/ 637 OVERRIDE(OP): Performed by: HOSPITALIST

## 2020-05-16 PROCEDURE — 99239 HOSP IP/OBS DSCHRG MGMT >30: CPT | Performed by: HOSPITALIST

## 2020-05-16 PROCEDURE — 80048 BASIC METABOLIC PNL TOTAL CA: CPT

## 2020-05-16 PROCEDURE — 80202 ASSAY OF VANCOMYCIN: CPT

## 2020-05-16 PROCEDURE — 36415 COLL VENOUS BLD VENIPUNCTURE: CPT

## 2020-05-16 PROCEDURE — 700105 HCHG RX REV CODE 258: Performed by: HOSPITALIST

## 2020-05-16 RX ORDER — OXYCODONE HYDROCHLORIDE 5 MG/1
5-10 TABLET ORAL EVERY 6 HOURS PRN
Qty: 30 TAB | Refills: 0 | Status: SHIPPED | OUTPATIENT
Start: 2020-05-16 | End: 2020-05-21

## 2020-05-16 RX ORDER — AMOXICILLIN AND CLAVULANATE POTASSIUM 875; 125 MG/1; MG/1
1 TABLET, FILM COATED ORAL 2 TIMES DAILY
Qty: 20 TAB | Refills: 0 | Status: SHIPPED | OUTPATIENT
Start: 2020-05-16 | End: 2020-05-26

## 2020-05-16 RX ORDER — LINEZOLID 600 MG/1
600 TABLET, FILM COATED ORAL EVERY 12 HOURS
Status: DISCONTINUED | OUTPATIENT
Start: 2020-05-16 | End: 2020-05-16 | Stop reason: HOSPADM

## 2020-05-16 RX ORDER — LINEZOLID 600 MG/1
600 TABLET, FILM COATED ORAL 2 TIMES DAILY
Qty: 20 TAB | Refills: 0 | Status: SHIPPED
Start: 2020-05-16 | End: 2020-05-16

## 2020-05-16 RX ORDER — SULFAMETHOXAZOLE AND TRIMETHOPRIM 800; 160 MG/1; MG/1
1 TABLET ORAL 2 TIMES DAILY
Qty: 20 TAB | Refills: 0 | Status: SHIPPED | OUTPATIENT
Start: 2020-05-16 | End: 2020-05-26

## 2020-05-16 RX ADMIN — OXYCODONE 10 MG: 5 TABLET ORAL at 06:09

## 2020-05-16 RX ADMIN — INSULIN LISPRO 3 UNITS: 100 INJECTION, SOLUTION INTRAVENOUS; SUBCUTANEOUS at 08:48

## 2020-05-16 RX ADMIN — VANCOMYCIN HYDROCHLORIDE 1700 MG: 500 INJECTION, POWDER, LYOPHILIZED, FOR SOLUTION INTRAVENOUS at 08:53

## 2020-05-16 RX ADMIN — CARVEDILOL 25 MG: 25 TABLET, FILM COATED ORAL at 08:45

## 2020-05-16 RX ADMIN — LINEZOLID 600 MG: 600 TABLET, FILM COATED ORAL at 12:04

## 2020-05-16 RX ADMIN — INSULIN LISPRO 3 UNITS: 100 INJECTION, SOLUTION INTRAVENOUS; SUBCUTANEOUS at 12:06

## 2020-05-16 ASSESSMENT — ENCOUNTER SYMPTOMS
COUGH: 0
ABDOMINAL PAIN: 0
DIZZINESS: 0
FEVER: 0
MYALGIAS: 1
NAUSEA: 0
HEADACHES: 0
DIARRHEA: 0
VOMITING: 0
CHILLS: 0

## 2020-05-16 NOTE — DISCHARGE PLANNING
Medical Social Work  PC to [facility]  Augmentin copay is $10.00  Bactrim copay is $6.02    JOSE A Castellano informed of cost

## 2020-05-16 NOTE — DISCHARGE INSTRUCTIONS
Discharge Instructions    Discharged to home by car with relative. Discharged via wheelchair, hospital escort: Yes.  Special equipment needed: Not Applicable    Be sure to schedule a follow-up appointment with your primary care doctor or any specialists as instructed.     Discharge Plan:   Diet Plan: Discussed  Activity Level: Discussed  Confirmed Follow up Appointment: Patient to Call and Schedule Appointment  Confirmed Symptoms Management: Discussed  Medication Reconciliation Updated: Yes    I understand that a diet low in cholesterol, fat, and sodium is recommended for good health. Unless I have been given specific instructions below for another diet, I accept this instruction as my diet prescription.   Other diet: diabetic    Special Instructions: None    · Is patient discharged on Warfarin / Coumadin?   No       Cellulitis, Adult  Introduction  Cellulitis is a skin infection. The infected area is usually red and sore. This condition occurs most often in the arms and lower legs. It is very important to get treated for this condition.  Follow these instructions at home:  · Take over-the-counter and prescription medicines only as told by your doctor.  · If you were prescribed an antibiotic medicine, take it as told by your doctor. Do not stop taking the antibiotic even if you start to feel better.  · Drink enough fluid to keep your pee (urine) clear or pale yellow.  · Do not touch or rub the infected area.  · Raise (elevate) the infected area above the level of your heart while you are sitting or lying down.  · Place warm or cold wet cloths (warm or cold compresses) on the infected area. Do this as told by your doctor.  · Keep all follow-up visits as told by your doctor. This is important. These visits let your doctor make sure your infection is not getting worse.  Contact a doctor if:  · You have a fever.  · Your symptoms do not get better after 1-2 days of treatment.  · Your bone or joint under the infected area  starts to hurt after the skin has healed.  · Your infection comes back. This can happen in the same area or another area.  · You have a swollen bump in the infected area.  · You have new symptoms.  · You feel ill and also have muscle aches and pains.  Get help right away if:  · Your symptoms get worse.  · You feel very sleepy.  · You throw up (vomit) or have watery poop (diarrhea) for a long time.  · There are red streaks coming from the infected area.  · Your red area gets larger.  · Your red area turns darker.  This information is not intended to replace advice given to you by your health care provider. Make sure you discuss any questions you have with your health care provider.  Document Released: 06/05/2009 Document Revised: 05/25/2017 Document Reviewed: 10/26/2016  © 2017 [employer]      Depression / Suicide Risk    As you are discharged from this [facility] facility, it is important to learn how to keep safe from harming yourself.    Recognize the warning signs:  · Abrupt changes in personality, positive or negative- including increase in energy   · Giving away possessions  · Change in eating patterns- significant weight changes-  positive or negative  · Change in sleeping patterns- unable to sleep or sleeping all the time   · Unwillingness or inability to communicate  · Depression  · Unusual sadness, discouragement and loneliness  · Talk of wanting to die  · Neglect of personal appearance   · Rebelliousness- reckless behavior  · Withdrawal from people/activities they love  · Confusion- inability to concentrate     If you or a loved one observes any of these behaviors or has concerns about self-harm, here's what you can do:  · Talk about it- your feelings and reasons for harming yourself  · Remove any means that you might use to hurt yourself (examples: pills, rope, extension cords, firearm)  · Get professional help from the community (Mental Health, Substance Abuse, psychological counseling)  · Do not be  alone:Call your Safe Contact- someone whom you trust who will be there for you.  · Call your local CRISIS HOTLINE 981-0647 or 289-126-1484  · Call your local Children's Mobile Crisis Response Team [place] (837) 596-0011 or www.[employer]  · Call the toll free National Suicide Prevention Hotlines   · National Suicide Prevention Lifeline 244-662-JZTT (2573)  · National InfluAds Line Network 800-SUICIDE (659-6273)

## 2020-05-16 NOTE — CONSULTS
INFECTIOUS DISEASES INPATIENT CONSULT NOTE     Date of Service: 5/16/2020    Consult Requested By: DORIS Hernandez    Reason for Consultation: Left hand abscess with cellulitis    History of Present Illness:   Richard Franklin is a 50 y.o. man with a history of type 2 diabetes mellitus complicated by peripheral neuropathy and diabetic foot ulcers, and cardiomyopathy with biventricular ICD placement admitted 5/12/2020 for left hand pain.  Patient states that on Saturday evening prior to admission he developed left hand pain specifically his left middle knuckle.  This was associated with some swelling and erythema, exacerbated with movement. He denies any trauma, injury or bug bites. No associated fevers or chills.  The following morning his symptoms progressed and he went to an urgent care where he was given a prescription for cephalexin.  He did not note any clinical improvement and thus presented to the [facility] ER on Monday prior to admission.  He was given IV antibiotics and instructed to continue cephalexin.  Patient's symptoms continued to progress and thus he presented to [facility] ED.  He did not report any associated fevers or chills.  On presentation, he was afebrile with a mild leukocytosis of 11.3.  ESR was 13.  X-ray of the left hand reveal dorsal soft tissue edema but no bony findings.  MRI revealed a fluid collection within the subcutaneous fat dorsal to the third MCP joint and at the third extensor tendon measuring 15 x 13 x 4 mm in size representing a subcutaneous abscess.  There was however no evidence of osteomyelitis.  Blood cultures on 5/11 and 5/12 have been negative to date.  He is currently on vancomycin.  He is now status post I&D on 5/15/2020.  A large amount of purulent fluid was encountered.  Debridement was down to the level of muscle.  Operative cultures are pending. ID service consulted for abx recommendations.      Review of Systems   Constitutional: Negative for chills  and fever.   Respiratory: Negative for cough.    Gastrointestinal: Negative for abdominal pain, diarrhea, nausea and vomiting.   Musculoskeletal: Positive for myalgias.   Neurological: Negative for dizziness and headaches.   All other systems reviewed and are negative.      PMH:   Past Medical History:   Diagnosis Date   • Chronic combined systolic and diastolic heart failure (HCC)    • Dilated cardiomyopathy (HCC)     rEFof 30-35%   • DM (diabetes mellitus) (HCC)    • History of obesity    • Hyperlipoproteinemia    • Hypertension    • Hypertriglyceridemia    • Mixed hyperlipidemia    • Palpitations    • PVC (premature ventricular contraction)    • Sleep apnea    • Ventricular tachycardia (HCC)        PSH:  Past Surgical History:   Procedure Laterality Date   • IRRIGATION & DEBRIDEMENT GENERAL Left 5/15/2020    Procedure: IRRIGATION AND DEBRIDEMENT, WOUND;  Surgeon: Franklin Tucker M.D.;  Location: [facility];  Service: Orthopedics   • AICD IMPLANT     • OTHER      THROAT SURGERY: Uvulopalatopharyngoplasty.   • ZZZ CARDIAC CATH         FAMILY HX:  Family History   Problem Relation Age of Onset   • Hypertension Mother        SOCIAL HX:  Social History     Socioeconomic History   • Marital status:      Spouse name: Not on file   • Number of children: Not on file   • Years of education: Not on file   • Highest education level: Not on file   Occupational History   • Not on file   Social Needs   • Financial resource strain: Not on file   • Food insecurity     Worry: Not on file     Inability: Not on file   • Transportation needs     Medical: Not on file     Non-medical: Not on file   Tobacco Use   • Smoking status: Never Smoker   • Smokeless tobacco: Former User   Substance and Sexual Activity   • Alcohol use: Yes     Frequency: 2-4 times a month     Drinks per session: 1 or 2     Binge frequency: Never     Comment: Occasionally   • Drug use: No   • Sexual activity: Not on file   Lifestyle   •  Physical activity     Days per week: Not on file     Minutes per session: Not on file   • Stress: Not on file   Relationships   • Social connections     Talks on phone: Not on file     Gets together: Not on file     Attends [employer] service: Not on file     Active member of club or organization: Not on file     Attends meetings of clubs or organizations: Not on file     Relationship status: Not on file   • Intimate partner violence     Fear of current or ex partner: Not on file     Emotionally abused: Not on file     Physically abused: Not on file     Forced sexual activity: Not on file   Other Topics Concern   • Not on file   Social History Narrative   • Not on file     Social History     Tobacco Use   Smoking Status Never Smoker   Smokeless Tobacco Former User     Social History     Substance and Sexual Activity   Alcohol Use Yes   • Frequency: 2-4 times a month   • Drinks per session: 1 or 2   • Binge frequency: Never    Comment: Occasionally       Allergies/Intolerances:  No Known Allergies    History reviewed with the patient     Other Current Medications:    Current Facility-Administered Medications:   •  oxyCODONE immediate-release (ROXICODONE) tablet 5-10 mg, 5-10 mg, Oral, Q6HRS PRN, Walt Pollard M.D., 10 mg at 05/16/20 0609  •  insulin lispro (HUMALOG) injection 30 Units, 30 Units, Subcutaneous, TID ACMed A.P.R.N., 30 Units at 05/16/20 0850  •  insulin lispro (HUMALOG) injection 3-14 Units, 3-14 Units, Subcutaneous, 4X/DAY ACHS, Walt Pollard M.D., 3 Units at 05/16/20 0848  •  vancomycin (VANCOCIN) 1,700 mg in  mL IVPB, 14 mg/kg, Intravenous, Q12HR, Walt Pollard M.D., Last Rate: 250 mL/hr at 05/16/20 0853, 1,700 mg at 05/16/20 0853  •  atorvastatin (LIPITOR) tablet 20 mg, 20 mg, Oral, DAILY, Jericho Myrick D.O., 20 mg at 05/15/20 2102  •  carvedilol (COREG) tablet 25 mg, 25 mg, Oral, BID WITH MEALS, Jericho Myrick D.O., 25 mg at 05/16/20 0845  •  insulin glargine (LANTUS) injection  70 Units, 70 Units, Subcutaneous, Q EVENING, HO MckeonO., 70 Units at 05/15/20 1659  •  lisinopril (PRINIVIL) tablet 40 mg, 40 mg, Oral, DAILY, HO MckeonO., 40 mg at 05/15/20 2101  •  senna-docusate (PERICOLACE or SENOKOT S) 8.6-50 MG per tablet 2 Tab, 2 Tab, Oral, BID, 2 Tab at 05/14/20 1723 **AND** polyethylene glycol/lytes (MIRALAX) PACKET 1 Packet, 1 Packet, Oral, QDAY PRN **AND** magnesium hydroxide (MILK OF MAGNESIA) suspension 30 mL, 30 mL, Oral, QDAY PRN **AND** bisacodyl (DULCOLAX) suppository 10 mg, 10 mg, Rectal, QDAY PRN, HO MckeonO.  •  enoxaparin (LOVENOX) inj 40 mg, 40 mg, Subcutaneous, DAILY, HO MckeonO., 40 mg at 05/13/20 0513  •  acetaminophen (TYLENOL) tablet 650 mg, 650 mg, Oral, Q6HRS PRN, HO MckeonO.  •  enalaprilat (VASOTEC) injection 1.25 mg, 1.25 mg, Intravenous, Q6HRS PRN, HO MckeonO.  •  ondansetron (ZOFRAN) syringe/vial injection 4 mg, 4 mg, Intravenous, Q4HRS PRN, HO MckeonO.  •  ondansetron (ZOFRAN ODT) dispertab 4 mg, 4 mg, Oral, Q4HRS PRN, HO MckeonO.  •  promethazine (PHENERGAN) tablet 12.5-25 mg, 12.5-25 mg, Oral, Q4HRS PRN, Jericho Myrick D.O.  •  promethazine (PHENERGAN) suppository 12.5-25 mg, 12.5-25 mg, Rectal, Q4HRS PRN, HO MckeonO.  •  prochlorperazine (COMPAZINE) injection 5-10 mg, 5-10 mg, Intravenous, Q4HRS PRN, Jericho Myrick D.O.  •  [DISCONTINUED] insulin regular (HUMULIN R) injection 3-14 Units, 3-14 Units, Subcutaneous, 4X/DAY ACHS, 3 Units at 05/14/20 0819 **AND** POC Blood Glucose, , , Q AC AND BEDTIME(S) **AND** NOTIFY MD and PharmD, , , Once **AND** glucose 4 g chewable tablet 16 g, 16 g, Oral, Q15 MIN PRN **AND** dextrose 50% (D50W) injection 50 mL, 50 mL, Intravenous, Q15 MIN PRN, Jericho Myrick D.O.  •  MD Alert...Vancomycin per Pharmacy, , Other, PHARMACY TO DOSE, Jericho Myrick D.O.  [unfilled]    Most Recent Vital Signs:  /86   Pulse 73   Temp  "36.4 °C (97.6 °F) (Temporal)   Resp 17   Ht 1.93 m (6' 4\")   Wt 122.2 kg (269 lb 6.4 oz)   SpO2 98%   BMI 32.79 kg/m²   Temp  Av.6 °C (97.9 °F)  Min: 36.2 °C (97.1 °F)  Max: 37.1 °C (98.7 °F)    Physical Exam:  General: well nourished, no diaphoresis, well-appearing, no acute distress  HEENT: sclera anicteric, PERRL, extraocular muscles intact, mucous membranes moist, oropharynx clear and moist, no oral lesions or exudate  Neck: supple, no lymphadenopathy  Chest: CTAB, no rales, rhonchi or wheezes, normal work of breathing. Left upper chest subcutaneous pacemaker present. No tenderness to palpation or surrounding erythema  Cardiac: regular rate and rhythm, normal S1 S2, no murmurs, rubs or gallops  Abdomen: + bowel sounds, soft, non-tender, non-distended, no hepatosplenomegaly  Extremities: WWP, no edema, 2+ pedal pulses. Left hand in surgical dressing. Photos reviewed on patient's cell phone. Dorsum of left hand with swelling and erythema.  Skin: warm and dry, no rashes or worrisome lesions  Neuro: Alert and oriented times 3, non-focal exam, speech fluent, full range of motion to bilateral upper and lower extremities  Psych: normal mood and behavior, pleasant; memory intact, normal judgement    Pertinent Lab Results:  Recent Labs     05/13/20  0358 05/14/20  0237 05/15/20  022   WBC 10.0 8.1 9.6      Recent Labs     05/14/20  0237 05/15/20  0225 20  023   HEMOGLOBIN 13.1* 13.1* 13.3*   HEMATOCRIT 39.2* 39.3* 41.0*   MCV 89.3 88.1 91.5   MCH 29.8 29.4 29.7   PLATELETCT 172 176 183         Recent Labs     20  0358 05/14/20  0237 05/15/20  0225   SODIUM 132* 137 134*   POTASSIUM 4.2 4.0 4.2   CHLORIDE 102 104 101   CO2 18* 22 21   CREATININE 1.07 0.95 0.94        Recent Labs     20  1840   ALBUMIN 4.1        Pertinent Micro:  Results     Procedure Component Value Units Date/Time    GRAM STAIN [[id number]] Collected:  05/15/20 1309    Order Status:  Completed Specimen:  Wound Updated:  " "05/15/20 2046     Significant Indicator .     Source WND     Site Left hand abscess     Gram Stain Result Many WBCs.  No organisms seen.      Narrative:       Surgery - swabs received    Anaerobic Culture [[id number]] Collected:  05/15/20 1309    Order Status:  Completed Specimen:  Wound Updated:  05/15/20 2046     Significant Indicator NEG     Source WND     Site Left hand abscess     Culture Result Culture in progress.    Narrative:       Surgery - swabs received    CULTURE WOUND W/ GRAM STAIN [[id number]] Collected:  05/15/20 1309    Order Status:  Completed Specimen:  Other Updated:  05/15/20 1529    SARS-CoV-2, PCR (In-House) [[id number]] Collected:  05/15/20 1101    Order Status:  Completed Updated:  05/15/20 1211     SARS-CoV-2 Source NP Swab     SARS-CoV-2 by PCR NotDetected     Comment: [facility] providers: PLEASE REFER TO DE-ESCALATION AND RETESTING PROTOCOL  on [email or web address]  **The [employer] GeneXpert Xpress SARS-CoV-2 Test has been made available for  use under the Emergency Use Authorization (EUA) only.         Narrative:       Collected By:[id number] [facility]  Preoperative protocol for surgery scheduled today    COVID/SARS CoV-2 [[id number]] Collected:  05/15/20 1101    Order Status:  Completed Specimen:  Respirate from Nasopharyngeal Updated:  05/15/20 1115     COVID Order Status Received    Narrative:       Collected By:[id number] [facility]  Preoperative protocol for surgery scheduled today    BLOOD CULTURE [[id number]] Collected:  05/12/20 1840    Order Status:  Completed Specimen:  Blood from Peripheral Updated:  05/13/20 0906     Significant Indicator NEG     Source BLD     Site PERIPHERAL     Culture Result No Growth  Note: Blood cultures are incubated for 5 days and  are monitored continuously.Positive blood cultures  are called to the RN and reported as soon as  they are identified.      Narrative:       Per Hospital Policy: Only change Specimen Src: to \"Line\" if  specified by physician " "order.  Right AC    BLOOD CULTURE [[id number]] Collected:  05/12/20 1912    Order Status:  Completed Specimen:  Blood from Peripheral Updated:  05/13/20 0906     Significant Indicator NEG     Source BLD     Site PERIPHERAL     Culture Result No Growth  Note: Blood cultures are incubated for 5 days and  are monitored continuously.Positive blood cultures  are called to the RN and reported as soon as  they are identified.      Narrative:       Per Hospital Policy: Only change Specimen Src: to \"Line\" if  specified by physician order.  No site indicated        Blood Culture   Date Value Ref Range Status   06/15/2018   Final    No growth after 5 days of incubation.  Blood culture testing and Gram stain, if indicated, are  performed at [facility], [address] [place] [address].  Positive blood cultures are  sent to [facility], [address] [place] [address], for organism identification and  susceptibility testing.          Studies:  Dx-hand 2- Left    Result Date: 5/13/2020 5/13/2020 12:45 PM HISTORY/REASON FOR EXAM:  Atraumatic Pain/Swelling/Deformity TECHNIQUE/EXAM DESCRIPTION AND NUMBER OF VIEWS: 2 views of the LEFT hand. COMPARISON:  None FINDINGS:  No acute fracture or malalignment. No bony destruction is identified. There is dorsal soft tissue edema.     Dorsal soft tissue edema centered over the metacarpophalangeal joints on the lateral view. No acute bony findings.    Mr-hand - With & W/o Left    Result Date: 5/14/2020 5/14/2020 10:32 AM HISTORY/REASON FOR EXAM:  Right hand pain and swelling. TECHNIQUE/EXAM DESCRIPTION: MRI of the LEFT hand and wrist without and with contrast. The study was performed on a SWIIM System Signa 1.5 Glenny MRI scanner. Multiplanar T1 and T2 precontrast followed by T1 fat-suppressed postcontrast images were obtained. 25 mL ProHance contrast was administered intravenously. COMPARISON:  None. FINDINGS: Osseous structures/cartilaginous surfaces: " There is no evidence of marrow edema or fracture. No evidence of abnormal bony enhancement. No destructive change. No significant joint effusion. Miscellaneous: There is edema and enhancement involving the dorsum of the hand extending along the millimeters in size. Consistent with cellulitis. Within the subcutaneous fat overlying the dorsal aspect of the third MCP joint and immediately overlying the extensor tendon is a rim-enhancing fluid collection which measures 15 x 13 x 4 mm in size.     1.  No evidence of osteomyelitis. 2.  Fluid collection within the subcutaneous fat dorsal to the third MCP joint and at the third extensor tendon which measures 15 x 13 x 4 mm in size likely representing a subcutaneous abscess. 3.  Cellulitis.      IMPRESSION:   1.  Left hand abscess    2.  Cellulitis  3. Type 2 diabetes mellitus  4. ICD in place      PLAN:   Richard Franklin is a 50 y.o. man with a history of poorly controlled type 2 diabetes mellitus complicated by peripheral neuropathy and diabetic foot ulcers, and cardiomyopathy status post ICD placement admitted for left hand pain, erythema and swelling and failure of oral antibiotics.  He was found to have an abscess with cellulitis on imaging.  He is now status post I&D of the left hand on 5/15/2020 by Dr. Tucker for source control.  Patient has an ICD in place however blood cultures have been negative.  Thus an echocardiogram is not warranted at this time.  Operative cultures are pending however they may be negative as the patient has been on abx. Suspect staph vs strep are the most likely pathogens causing his infection. Continue IV vancomycin. Monitor renal function and vanco trough.   Anticipate a 10-day course of antibiotics from surgery. Can discharge patient on oral linezolid 600 mg BID or bactrim 1 DS tab BID plus augmentin 875 mg BID. Continue wound care.    Follow up in the ID clinic as needed.    Plan of care discussed with DORIS Castellano.. Will  continue to follow    Marsha Stevenson M.D.      Please note that this dictation was created using voice recognition software. I have worked with technical experts from [facility] to optimize the interface.  I have made every reasonable attempt to correct obvious errors, but there may be errors of grammar and possibly content that I did not discover before finalizing the note.

## 2020-05-16 NOTE — PROGRESS NOTES
Denied numbness or tingling sensations.  Left hand dressing cdi.  Getting vancomycin at this time.

## 2020-05-16 NOTE — PROGRESS NOTES
Rounded on patient. Addressed all needs.  All questions answered. Patient is pleased to discharge at it was handled promptly.

## 2020-05-16 NOTE — DISCHARGE SUMMARY
Discharge Summary    CHIEF COMPLAINT ON ADMISSION  Chief Complaint   Patient presents with   • Hand Pain       Reason for Admission  Swollen L hand.     Admission Date  5/12/2020    CODE STATUS  Full Code    HPI & HOSPITAL COURSE   This is a 50 y.o. male with a past medical history of diabetes, heart failure, hypertension, hyperlipidemia, peripheral neuropathy, biventricular ICD placement here with complaints of worsening left hand pain and swelling.  He was found to have left hand cellulitis.  He has failed outpatient antibiotics as he had no improvement with oral cephalexin prescription, which he received from urgent care.  He was initiated on IV vancomycin.  MRI of the left hand a fluid collection within the subcutaneous fat dorsal to the third MCP joint and at the third extensor tendon measuring 15 x 13 x 4 mm in size representing a subcutaneous abscess.  Orthopedic surgery was consulted and performed left hand incision and drainage on 5/15/2020.  He was noted to have a large amount of purulent drainage during the procedure.  OR cultures have been collected and are pending.  At this time the suspected etiology is staph vs strep species.  He has had improvement of surrounding erythema with vancomycin.  Dr. Stevenson from infectious disease evaluated the case and recommends completion of a 10-day regimen of Augmentin and Bactrim.  The final culture results will be followed.  He will also need ongoing outpatient surgery follow up.      At this time the patient is medically stable.  He has no further need for acute intervention.  He will be given an antibiotic prescription and a limited narcotic supply.  He is expected to maintain close outpatient follow up.      Therefore, he is discharged in good and stable condition to home with close outpatient follow-up.    The patient met 2-midnight criteria for an inpatient stay at the time of discharge.    Discharge Date  5/16/2020    FOLLOW UP ITEMS POST DISCHARGE  -Follow  with orthopedic surgery in the outpatient setting.  -Complete oral regimen of Augmentin and Bactrim.     DISCHARGE DIAGNOSES  Active Problems:    Cellulitis POA: Yes    Chronic combined systolic and diastolic heart failure (HCC) (Chronic) POA: Yes    Diabetes mellitus with hyperglycemia (HCC) POA: Yes    HTN (hypertension) (Chronic) POA: Yes    Mixed hyperlipidemia (Chronic) POA: Yes  Resolved Problems:    Leukocytosis POA: Yes      FOLLOW UP  Follow with PCP in 1-2 weeks.    Franklin Tucker M.D.  [address] [facility] [address]  [phone]    Schedule an appointment as soon as possible for a visit in 1 week        MEDICATIONS ON DISCHARGE     Medication List      START taking these medications      Instructions   amoxicillin-clavulanate 875-125 MG Tabs  Commonly known as:  AUGMENTIN   Take 1 Tab by mouth 2 times a day for 10 days.  Dose:  1 Tab     oxyCODONE immediate-release 5 MG Tabs  Commonly known as:  ROXICODONE   Take 1-2 Tabs by mouth every 6 hours as needed for Severe Pain for up to 5 days.  Dose:  5-10 mg     sulfamethoxazole-trimethoprim 800-160 MG tablet  Commonly known as:  BACTRIM DS   Take 1 Tab by mouth 2 times a day for 10 days.  Dose:  1 Tab        CONTINUE taking these medications      Instructions   atorvastatin 20 MG Tabs  Commonly known as:  LIPITOR   Take 1 Tab by mouth every day.  Dose:  20 mg     carvedilol 25 MG Tabs  Commonly known as:  COREG   Take 1 Tab by mouth 2 Times a Day.  Dose:  25 mg     Farxiga 10 MG Tabs  Generic drug:  Dapagliflozin Propanediol   Take 1 tablet by mouth.  Dose:  1 tablet     * insulin aspart 100 UNIT/ML Soln  Commonly known as:  NOVOLOG   Inject 30-50 Units as instructed 3 times a day before meals. 30 AM  50 AFTERNOON  30 PM  Dose:  30-50 Units     * NovoLOG FlexPen 100 UNIT/ML injection PEN  Generic drug:  NOVOLOG (insulin aspart)   INJECT 30 UNITS SUBCUTANEOUSLY DAILY IN THE MORNING THEN INJECT 50 UNITS DAILY AT LUNCH AND INJECT 30 UNITS DAILY AT  DINNER.     insulin glargine 100 UNIT/ML Sopn injection  Generic drug:  insulin glargine   INJECT 70 UNITS SUBCUTANEOUSLY EVERY NIGHT AT BEDTIME. INCREASE BY 1 UNIT EVERY NIGHT UNTIL GLUCOSE IS LESS THAN 180     Janumet XR  MG Tb24  Generic drug:  SitaGLIPtin-MetFORMIN HCl   Take 2 Tabs by mouth every day.  Dose:  2 Tab     lisinopril 40 MG tablet  Commonly known as:  PRINIVIL   Take 1 Tab by mouth every day.  Dose:  40 mg     spironolactone 25 MG Tabs  Commonly known as:  ALDACTONE   Take 1 Tab by mouth every day.  Dose:  25 mg     Tresiba FlexTouch 100 UNIT/ML Sopn  Generic drug:  Insulin Degludec   Inject  as instructed.     Vascepa 1 g Caps  Generic drug:  Icosapent Ethyl   Take 2 Caps by mouth 2 Times a Day.  Dose:  2 Cap         * This list has 2 medication(s) that are the same as other medications prescribed for you. Read the directions carefully, and ask your doctor or other care provider to review them with you.                Allergies  No Known Allergies    DIET  Orders Placed This Encounter   Procedures   • Diet Order Diabetic     Standing Status:   Standing     Number of Occurrences:   1     Order Specific Question:   Diet:     Answer:   Diabetic [3]       ACTIVITY  As tolerated.  Weight bearing as tolerated    CONSULTATIONS  Orthopedic Surgery - Dr. Tucker   Infectious Disease - Dr. Stevenson    PROCEDURES  5/15/2020:  Left hand incision and drainage.     LABORATORY  Lab Results   Component Value Date    SODIUM 138 05/16/2020    POTASSIUM 4.1 05/16/2020    CHLORIDE 104 05/16/2020    CO2 20 05/16/2020    GLUCOSE 170 (H) 05/16/2020    BUN 19 05/16/2020    CREATININE 1.04 05/16/2020        Lab Results   Component Value Date    WBC 9.8 05/16/2020    HEMOGLOBIN 13.3 (L) 05/16/2020    HEMATOCRIT 41.0 (L) 05/16/2020    PLATELETCT 183 05/16/2020        Total time of the discharge process was 36 minutes.

## 2020-05-16 NOTE — DISCHARGE PLANNING
Medical Social Work  PC to [facility] on [address]. Told that the RX will need prior authorization, cash price is $456.54  [place] test to JOSE A Pollard with an update

## 2020-05-16 NOTE — PROGRESS NOTES
Received report from night shift and assumed care. Assessment completed, POC discussed. Pt is to discharge home today once cleared by ID for PO abx. Pt currently receiving IV Vancomycin. States the pain in his hand has improved. Medications given per MAR. Pt currently sitting at edge of bed eating breakfast. All needs met. Safety precautions and hourly rounding in place.

## 2020-05-16 NOTE — PROGRESS NOTES
Explained to pt that charge RN called Dr. Pollard and received the one time order for morphine.  Pt didn't want to take it this time.  He said that he will call when he is ready for morphine.

## 2020-05-16 NOTE — CARE PLAN
Problem: Pain Management  Goal: Pain level will decrease to patient's comfort goal  Outcome: PROGRESSING AS EXPECTED     Problem: Safety  Goal: Will remain free from falls  Outcome: PROGRESSING AS EXPECTED

## 2020-05-17 LAB
BACTERIA BLD CULT: NORMAL
BACTERIA BLD CULT: NORMAL
SIGNIFICANT IND 70042: NORMAL
SIGNIFICANT IND 70042: NORMAL
SITE SITE: NORMAL
SITE SITE: NORMAL
SOURCE SOURCE: NORMAL
SOURCE SOURCE: NORMAL

## 2020-05-18 ENCOUNTER — TELEPHONE (OUTPATIENT)
Dept: INFECTIOUS DISEASES | Facility: MEDICAL CENTER | Age: 50
End: 2020-05-18

## 2020-05-18 LAB
BACTERIA SPEC ANAEROBE CULT: NORMAL
BACTERIA WND AEROBE CULT: ABNORMAL
BACTERIA WND AEROBE CULT: ABNORMAL
GRAM STN SPEC: ABNORMAL
SIGNIFICANT IND 70042: ABNORMAL
SIGNIFICANT IND 70042: NORMAL
SITE SITE: ABNORMAL
SITE SITE: NORMAL
SOURCE SOURCE: ABNORMAL
SOURCE SOURCE: NORMAL

## 2020-05-18 NOTE — TELEPHONE ENCOUNTER
Called and updated patient on OR cultures. OR cultures grew MSSA. Instructed patient to discontinue bactrim and continue PO augmentin. Pt has ortho clinic appt with Dr. Tucker on Thursday.

## 2020-06-28 LAB
GRAM STN SPEC: NORMAL
SIGNIFICANT IND 70042: NORMAL
SITE SITE: NORMAL
SOURCE SOURCE: NORMAL

## 2020-07-03 DIAGNOSIS — I51.89 LEFT VENTRICULAR SYSTOLIC DYSFUNCTION, NYHA CLASS 1: ICD-10-CM

## 2020-07-03 DIAGNOSIS — I50.20 ACC/AHA STAGE C SYSTOLIC HEART FAILURE (HCC): ICD-10-CM

## 2020-07-06 RX ORDER — CARVEDILOL 25 MG/1
TABLET ORAL
Qty: 180 TAB | Refills: 1 | Status: SHIPPED | OUTPATIENT
Start: 2020-07-06

## 2020-09-22 ENCOUNTER — TELEPHONE (OUTPATIENT)
Dept: CARDIOLOGY | Facility: MEDICAL CENTER | Age: 50
End: 2020-09-22

## 2020-09-22 NOTE — TELEPHONE ENCOUNTER
FYI:  Remote transmission 9/19/2020:    1-NSVT episode (30bts) 9/18/2020@7:49am lasting 15 seconds.    Scanned into media.

## 2020-10-19 ENCOUNTER — TELEPHONE (OUTPATIENT)
Dept: CARDIOLOGY | Facility: MEDICAL CENTER | Age: 50
End: 2020-10-19

## 2020-10-19 NOTE — TELEPHONE ENCOUNTER
Remote Transmission 10/19/2020:    1-NSVT/SVT (14bts)  episode 10/18/2020@10:17pm lasting 14 secs.     Scanned into media.

## 2020-10-19 NOTE — TELEPHONE ENCOUNTER
LVM with pt at [phone] requesting call back to discuss if he had any symptoms at time of episode.

## 2020-10-20 DIAGNOSIS — I51.89 LEFT VENTRICULAR SYSTOLIC DYSFUNCTION, NYHA CLASS 1: ICD-10-CM

## 2020-10-20 DIAGNOSIS — I50.20 ACC/AHA STAGE C SYSTOLIC HEART FAILURE (HCC): ICD-10-CM

## 2020-10-21 ENCOUNTER — HOSPITAL ENCOUNTER (OUTPATIENT)
Dept: LAB | Facility: MEDICAL CENTER | Age: 50
End: 2020-10-21
Attending: PHYSICIAN ASSISTANT
Payer: COMMERCIAL

## 2020-10-21 LAB
ALBUMIN SERPL BCP-MCNC: 4.4 G/DL (ref 3.2–4.9)
ALBUMIN/GLOB SERPL: 1.6 G/DL
ALP SERPL-CCNC: 56 U/L (ref 30–99)
ALT SERPL-CCNC: 28 U/L (ref 2–50)
ANION GAP SERPL CALC-SCNC: 10 MMOL/L (ref 7–16)
AST SERPL-CCNC: 16 U/L (ref 12–45)
BILIRUB SERPL-MCNC: 0.5 MG/DL (ref 0.1–1.5)
BUN SERPL-MCNC: 38 MG/DL (ref 8–22)
CALCIUM SERPL-MCNC: 9.4 MG/DL (ref 8.5–10.5)
CHLORIDE SERPL-SCNC: 102 MMOL/L (ref 96–112)
CHOLEST SERPL-MCNC: 143 MG/DL (ref 100–199)
CO2 SERPL-SCNC: 25 MMOL/L (ref 20–33)
CREAT SERPL-MCNC: 1.13 MG/DL (ref 0.5–1.4)
EST. AVERAGE GLUCOSE BLD GHB EST-MCNC: 269 MG/DL
FASTING STATUS PATIENT QL REPORTED: NORMAL
GLOBULIN SER CALC-MCNC: 2.8 G/DL (ref 1.9–3.5)
GLUCOSE SERPL-MCNC: 246 MG/DL (ref 65–99)
HBA1C MFR BLD: 11 % (ref 0–5.6)
HDLC SERPL-MCNC: 24 MG/DL
LDLC SERPL CALC-MCNC: ABNORMAL MG/DL
POTASSIUM SERPL-SCNC: 4.4 MMOL/L (ref 3.6–5.5)
PROT SERPL-MCNC: 7.2 G/DL (ref 6–8.2)
SODIUM SERPL-SCNC: 137 MMOL/L (ref 135–145)
TRIGL SERPL-MCNC: 439 MG/DL (ref 0–149)

## 2020-10-21 PROCEDURE — 80053 COMPREHEN METABOLIC PANEL: CPT

## 2020-10-21 PROCEDURE — 83036 HEMOGLOBIN GLYCOSYLATED A1C: CPT

## 2020-10-21 PROCEDURE — 36415 COLL VENOUS BLD VENIPUNCTURE: CPT

## 2020-10-21 PROCEDURE — 80061 LIPID PANEL: CPT

## 2020-10-21 PROCEDURE — 82043 UR ALBUMIN QUANTITATIVE: CPT

## 2020-10-21 PROCEDURE — 82570 ASSAY OF URINE CREATININE: CPT

## 2020-10-21 RX ORDER — LISINOPRIL 40 MG/1
TABLET ORAL
Qty: 90 TAB | Refills: 0 | Status: SHIPPED | OUTPATIENT
Start: 2020-10-21 | End: 2020-12-30

## 2020-10-22 LAB
CREAT UR-MCNC: 45.78 MG/DL
MICROALBUMIN UR-MCNC: 1.7 MG/DL
MICROALBUMIN/CREAT UR: 37 MG/G (ref 0–30)

## 2020-11-12 DIAGNOSIS — E78.2 MIXED HYPERLIPIDEMIA: ICD-10-CM

## 2020-11-12 RX ORDER — ATORVASTATIN CALCIUM 20 MG/1
TABLET, FILM COATED ORAL
Qty: 90 TAB | Refills: 0 | Status: SHIPPED | OUTPATIENT
Start: 2020-11-12

## 2020-12-29 DIAGNOSIS — I51.89 LEFT VENTRICULAR SYSTOLIC DYSFUNCTION, NYHA CLASS 1: ICD-10-CM

## 2020-12-29 DIAGNOSIS — I50.20 ACC/AHA STAGE C SYSTOLIC HEART FAILURE (HCC): ICD-10-CM

## 2020-12-30 RX ORDER — SPIRONOLACTONE 25 MG/1
TABLET ORAL
Qty: 30 TAB | Refills: 0 | Status: SHIPPED | OUTPATIENT
Start: 2020-12-30

## 2020-12-30 RX ORDER — LISINOPRIL 40 MG/1
TABLET ORAL
Qty: 30 TAB | Refills: 0 | Status: SHIPPED | OUTPATIENT
Start: 2020-12-30

## 2021-01-20 ENCOUNTER — HOSPITAL ENCOUNTER (OUTPATIENT)
Dept: LAB | Facility: MEDICAL CENTER | Age: 51
End: 2021-01-20
Attending: PHYSICIAN ASSISTANT
Payer: COMMERCIAL

## 2021-01-20 LAB
ALBUMIN SERPL BCP-MCNC: 4.4 G/DL (ref 3.2–4.9)
ALBUMIN/GLOB SERPL: 1.6 G/DL
ALP SERPL-CCNC: 44 U/L (ref 30–99)
ALT SERPL-CCNC: 20 U/L (ref 2–50)
ANION GAP SERPL CALC-SCNC: 8 MMOL/L (ref 7–16)
AST SERPL-CCNC: 19 U/L (ref 12–45)
BASOPHILS # BLD AUTO: 0.8 % (ref 0–1.8)
BASOPHILS # BLD: 0.06 K/UL (ref 0–0.12)
BILIRUB SERPL-MCNC: 0.6 MG/DL (ref 0.1–1.5)
BUN SERPL-MCNC: 24 MG/DL (ref 8–22)
CALCIUM SERPL-MCNC: 9.5 MG/DL (ref 8.5–10.5)
CHLORIDE SERPL-SCNC: 105 MMOL/L (ref 96–112)
CHOLEST SERPL-MCNC: 99 MG/DL (ref 100–199)
CO2 SERPL-SCNC: 26 MMOL/L (ref 20–33)
CREAT SERPL-MCNC: 1.12 MG/DL (ref 0.5–1.4)
CREAT UR-MCNC: 130.62 MG/DL
EOSINOPHIL # BLD AUTO: 0.2 K/UL (ref 0–0.51)
EOSINOPHIL NFR BLD: 2.7 % (ref 0–6.9)
ERYTHROCYTE [DISTWIDTH] IN BLOOD BY AUTOMATED COUNT: 45.9 FL (ref 35.9–50)
EST. AVERAGE GLUCOSE BLD GHB EST-MCNC: 137 MG/DL
FASTING STATUS PATIENT QL REPORTED: NORMAL
GLOBULIN SER CALC-MCNC: 2.7 G/DL (ref 1.9–3.5)
GLUCOSE SERPL-MCNC: 118 MG/DL (ref 65–99)
HBA1C MFR BLD: 6.4 % (ref 0–5.6)
HCT VFR BLD AUTO: 45 % (ref 42–52)
HDLC SERPL-MCNC: 26 MG/DL
HGB BLD-MCNC: 14.5 G/DL (ref 14–18)
IMM GRANULOCYTES # BLD AUTO: 0.03 K/UL (ref 0–0.11)
IMM GRANULOCYTES NFR BLD AUTO: 0.4 % (ref 0–0.9)
LDLC SERPL CALC-MCNC: 58 MG/DL
LYMPHOCYTES # BLD AUTO: 2.09 K/UL (ref 1–4.8)
LYMPHOCYTES NFR BLD: 28.5 % (ref 22–41)
MCH RBC QN AUTO: 30 PG (ref 27–33)
MCHC RBC AUTO-ENTMCNC: 32.2 G/DL (ref 33.7–35.3)
MCV RBC AUTO: 93 FL (ref 81.4–97.8)
MICROALBUMIN UR-MCNC: 5.9 MG/DL
MICROALBUMIN/CREAT UR: 45 MG/G (ref 0–30)
MONOCYTES # BLD AUTO: 0.64 K/UL (ref 0–0.85)
MONOCYTES NFR BLD AUTO: 8.7 % (ref 0–13.4)
NEUTROPHILS # BLD AUTO: 4.32 K/UL (ref 1.82–7.42)
NEUTROPHILS NFR BLD: 58.9 % (ref 44–72)
NRBC # BLD AUTO: 0 K/UL
NRBC BLD-RTO: 0 /100 WBC
PLATELET # BLD AUTO: 164 K/UL (ref 164–446)
PMV BLD AUTO: 10.6 FL (ref 9–12.9)
POTASSIUM SERPL-SCNC: 4.9 MMOL/L (ref 3.6–5.5)
PROT SERPL-MCNC: 7.1 G/DL (ref 6–8.2)
RBC # BLD AUTO: 4.84 M/UL (ref 4.7–6.1)
SODIUM SERPL-SCNC: 139 MMOL/L (ref 135–145)
T4 FREE SERPL-MCNC: 1.29 NG/DL (ref 0.93–1.7)
TRIGL SERPL-MCNC: 75 MG/DL (ref 0–149)
TSH SERPL DL<=0.005 MIU/L-ACNC: 1.16 UIU/ML (ref 0.38–5.33)
WBC # BLD AUTO: 7.3 K/UL (ref 4.8–10.8)

## 2021-01-20 PROCEDURE — 82043 UR ALBUMIN QUANTITATIVE: CPT

## 2021-01-20 PROCEDURE — 80053 COMPREHEN METABOLIC PANEL: CPT

## 2021-01-20 PROCEDURE — 84443 ASSAY THYROID STIM HORMONE: CPT

## 2021-01-20 PROCEDURE — 80061 LIPID PANEL: CPT

## 2021-01-20 PROCEDURE — 85025 COMPLETE CBC W/AUTO DIFF WBC: CPT

## 2021-01-20 PROCEDURE — 84439 ASSAY OF FREE THYROXINE: CPT

## 2021-01-20 PROCEDURE — 83036 HEMOGLOBIN GLYCOSYLATED A1C: CPT

## 2021-01-20 PROCEDURE — 82570 ASSAY OF URINE CREATININE: CPT

## 2021-01-20 PROCEDURE — 36415 COLL VENOUS BLD VENIPUNCTURE: CPT

## 2021-11-01 ENCOUNTER — TELEPHONE (OUTPATIENT)
Dept: CARDIOLOGY | Facility: MEDICAL CENTER | Age: 51
End: 2021-11-01

## 2022-09-17 NOTE — PROGRESS NOTES
"   Orthopaedic PA Progress Note    Interval changes:Doing well, ready for home    ROS - Patient denies any new issues. No chest pain, dyspnea, or fever.  Pain well controlled.    /80   Pulse 64   Temp 36.4 °C (97.5 °F) (Temporal)   Resp 15   Ht 1.93 m (6' 4\")   Wt 122.2 kg (269 lb 6.4 oz)   SpO2 95%     Patient seen and examined  No acute distress  Breathing non labored  RRR  Dressing CDI  .            -EPL/FPL intact              -SILT M/U/R/AIN/PIN/Msc/Ax nerves              -+WF/WE/EDC//IO/terminal digit flex/ext              -compartments soft and compressible              -pulses palpable, brisk capillary refill      Recent Labs     05/14/20  0237 05/15/20  0225 05/16/20  0231   WBC 8.1 9.6 9.8   RBC 4.39* 4.46* 4.48*   HEMOGLOBIN 13.1* 13.1* 13.3*   HEMATOCRIT 39.2* 39.3* 41.0*   MCV 89.3 88.1 91.5   MCH 29.8 29.4 29.7   MCHC 33.4* 33.3* 32.4*   RDW 44.9 43.4 45.4   PLATELETCT 172 176 183   MPV 9.6 9.6 9.7       Active Hospital Problems    Diagnosis   • Cellulitis [L03.90]     Priority: High   • Chronic combined systolic and diastolic heart failure (HCC) [I50.42]     Priority: Medium   • Diabetes mellitus with hyperglycemia (HCC) [E11.65]     Priority: Medium   • HTN (hypertension) [I10]     Priority: Medium   • Leukocytosis [D72.829]   • Mixed hyperlipidemia [E78.2]       Assessment/Plan:  POD#1 S/P Irrigation and debridement, left dorsal hand abscess.  Wt bearing status - AT  PT/OT-initiated  Wound care:dressing change likeley tomorrow  Drains - no  Cmcall-no  Sutures/Staples out- 10-14 days post operatively  Antibiotics: Vancocin  DVT Prophylaxis- TEDS/SCDs/Foot pumps.   Lovenox: currently 40 mg daily, not required by Ortho if ambulatory  Future Procedures - none planned  Case Coordination for Discharge Planning - Disposition Follow-Up: needs appointment with Dr. Tucker at [facility] at -10 days post-op for re-evaluation, staple removal and radiographs.        " Right knee pain Right knee pain Right knee pain Right knee pain

## 2022-10-10 NOTE — ADDENDUM NOTE
Encounter addended by: Felipe Obrien M.D. on: 10/10/2022 7:51 AM   Actions taken: Delete clinical note

## (undated) DEVICE — SET LEADWIRE 5 LEAD BEDSIDE DISPOSABLE ECG (1SET OF 5/EA)

## (undated) DEVICE — BLADE SURGICAL #15 - (50/BX 3BX/CA)

## (undated) DEVICE — SUCTION INSTRUMENT YANKAUER BULBOUS TIP W/O VENT (50EA/CA)

## (undated) DEVICE — DRAPE LAPAROTOMY T SHEET - (12EA/CA)

## (undated) DEVICE — GOWN WARMING STANDARD FLEX - (30/CA)

## (undated) DEVICE — LACTATED RINGERS INJ 1000 ML - (14EA/CA 60CA/PF)

## (undated) DEVICE — SYRINGE 10 ML CONTROL LL (25EA/BX 4BX/CA)

## (undated) DEVICE — SET EXTENSION WITH 2 PORTS (48EA/CA) ***PART #2C8610 IS A SUBSTITUTE*****

## (undated) DEVICE — SUTURE 3-0 ETHILON PS-1 (36PK/BX)

## (undated) DEVICE — SODIUM CHL IRRIGATION 0.9% 1000ML (12EA/CA)

## (undated) DEVICE — BOVIE BLADE COATED - (50/PK)

## (undated) DEVICE — KIT ANESTHESIA W/CIRCUIT & 3/LT BAG W/FILTER (20EA/CA)

## (undated) DEVICE — GLOVE BIOGEL PI INDICATOR SZ 7.0 SURGICAL PF LF - (50/BX 4BX/CA)

## (undated) DEVICE — NEPTUNE 4 PORT MANIFOLD - (20/PK)

## (undated) DEVICE — TRAY SKIN SCRUB PVP WET (20EA/CA) PART #DYND70356 DISCONTINUED

## (undated) DEVICE — TUBING CLEARLINK DUO-VENT - C-FLO (48EA/CA)

## (undated) DEVICE — GLOVE BIOGEL PI ORTHO SZ 6 1/2 SURGICAL PF LF (40PR/BX)

## (undated) DEVICE — GLOVE BIOGEL INDICATOR SZ 8 SURGICAL PF LTX - (50/BX 4BX/CA)

## (undated) DEVICE — PACK MINOR BASIN - (2EA/CA)

## (undated) DEVICE — CANISTER SUCTION 3000ML MECHANICAL FILTER AUTO SHUTOFF MEDI-VAC NONSTERILE LF DISP  (40EA/CA)

## (undated) DEVICE — PROTECTOR ULNA NERVE - (36PR/CA)

## (undated) DEVICE — SUTURE GENERAL

## (undated) DEVICE — ELECTRODE DUAL RETURN W/ CORD - (50/PK)

## (undated) DEVICE — ELECTRODE 850 FOAM ADHESIVE - HYDROGEL RADIOTRNSPRNT (50/PK)

## (undated) DEVICE — DETERGENT RENUZYME PLUS 10 OZ PACKET (50/BX)

## (undated) DEVICE — HEAD HOLDER JUNIOR/ADULT

## (undated) DEVICE — SENSOR SPO2 NEO LNCS ADHESIVE (20/BX) SEE USER NOTES

## (undated) DEVICE — STAPLER SKIN DISP - (6/BX 10BX/CA) VISISTAT

## (undated) DEVICE — MASK ANESTHESIA ADULT  - (100/CA)